# Patient Record
Sex: FEMALE | Race: WHITE | NOT HISPANIC OR LATINO | Employment: OTHER | URBAN - METROPOLITAN AREA
[De-identification: names, ages, dates, MRNs, and addresses within clinical notes are randomized per-mention and may not be internally consistent; named-entity substitution may affect disease eponyms.]

---

## 2017-07-20 ENCOUNTER — HOSPITAL ENCOUNTER (OUTPATIENT)
Dept: MAMMOGRAPHY | Facility: HOSPITAL | Age: 62
Discharge: HOME | End: 2017-07-20

## 2018-08-14 ENCOUNTER — HOSPITAL ENCOUNTER (OUTPATIENT)
Dept: MAMMOGRAPHY | Facility: HOSPITAL | Age: 63
Discharge: HOME | End: 2018-08-14
Attending: FAMILY MEDICINE | Admitting: FAMILY MEDICINE

## 2019-07-08 RX ORDER — LEVOTHYROXINE SODIUM 0.1 MG/1
TABLET ORAL
Qty: 90 TABLET | Refills: 1 | Status: SHIPPED | OUTPATIENT
Start: 2019-07-08 | End: 2019-08-13

## 2019-07-08 RX ORDER — TRIAMTERENE AND HYDROCHLOROTHIAZIDE 37.5; 25 MG/1; MG/1
TABLET ORAL
Qty: 90 TABLET | Refills: 1 | Status: SHIPPED | OUTPATIENT
Start: 2019-07-08 | End: 2019-08-15 | Stop reason: SDUPTHER

## 2019-08-02 ENCOUNTER — RESULTS ENCOUNTER (OUTPATIENT)
Dept: FAMILY MEDICINE CLINIC | Facility: CLINIC | Age: 64
End: 2019-08-02

## 2019-08-02 DIAGNOSIS — E03.9 HYPOTHYROIDISM, UNSPECIFIED TYPE: Primary | ICD-10-CM

## 2019-08-02 DIAGNOSIS — E78.5 DYSLIPIDEMIA: ICD-10-CM

## 2019-08-02 DIAGNOSIS — I10 HYPERTENSION, UNSPECIFIED TYPE: ICD-10-CM

## 2019-08-02 DIAGNOSIS — E03.9 HYPOTHYROIDISM, UNSPECIFIED TYPE: ICD-10-CM

## 2019-08-05 ENCOUNTER — TRANSCRIBE ORDERS (OUTPATIENT)
Dept: ADMINISTRATIVE | Facility: HOSPITAL | Age: 64
End: 2019-08-05

## 2019-08-05 DIAGNOSIS — Z12.39 SCREENING BREAST EXAMINATION: Primary | ICD-10-CM

## 2019-08-06 LAB
ALBUMIN SERPL-MCNC: 4 G/DL (ref 3.6–4.8)
ALBUMIN/GLOB SERPL: 1.7 {RATIO} (ref 1.2–2.2)
ALP SERPL-CCNC: 163 IU/L (ref 39–117)
ALT SERPL-CCNC: 38 IU/L (ref 0–32)
AST SERPL-CCNC: 29 IU/L (ref 0–40)
BASOPHILS # BLD AUTO: 0.1 X10E3/UL (ref 0–0.2)
BASOPHILS NFR BLD AUTO: 1 %
BILIRUB SERPL-MCNC: 0.4 MG/DL (ref 0–1.2)
BUN SERPL-MCNC: 13 MG/DL (ref 8–27)
BUN/CREAT SERPL: 20 (ref 12–28)
CALCIUM SERPL-MCNC: 10.1 MG/DL (ref 8.7–10.3)
CHLORIDE SERPL-SCNC: 106 MMOL/L (ref 96–106)
CHOLEST SERPL-MCNC: 143 MG/DL (ref 100–199)
CO2 SERPL-SCNC: 24 MMOL/L (ref 20–29)
CREAT SERPL-MCNC: 0.65 MG/DL (ref 0.57–1)
EOSINOPHIL # BLD AUTO: 0.1 X10E3/UL (ref 0–0.4)
EOSINOPHIL NFR BLD AUTO: 2 %
ERYTHROCYTE [DISTWIDTH] IN BLOOD BY AUTOMATED COUNT: 12.9 % (ref 12.3–15.4)
GLOBULIN SER CALC-MCNC: 2.3 G/DL (ref 1.5–4.5)
GLUCOSE SERPL-MCNC: 90 MG/DL (ref 65–99)
HCT VFR BLD AUTO: 39.7 % (ref 34–46.6)
HDLC SERPL-MCNC: 55 MG/DL
HGB BLD-MCNC: 13.6 G/DL (ref 11.1–15.9)
IMM GRANULOCYTES # BLD AUTO: 0 X10E3/UL (ref 0–0.1)
IMM GRANULOCYTES NFR BLD AUTO: 0 %
LDLC SERPL CALC-MCNC: 75 MG/DL (ref 0–99)
LYMPHOCYTES # BLD AUTO: 1.2 X10E3/UL (ref 0.7–3.1)
LYMPHOCYTES NFR BLD AUTO: 25 %
MCH RBC QN AUTO: 29.9 PG (ref 26.6–33)
MCHC RBC AUTO-ENTMCNC: 34.3 G/DL (ref 31.5–35.7)
MCV RBC AUTO: 87 FL (ref 79–97)
MONOCYTES # BLD AUTO: 0.3 X10E3/UL (ref 0.1–0.9)
MONOCYTES NFR BLD AUTO: 6 %
NEUTROPHILS # BLD AUTO: 3 X10E3/UL (ref 1.4–7)
NEUTROPHILS NFR BLD AUTO: 66 %
POTASSIUM SERPL-SCNC: 3.6 MMOL/L (ref 3.5–5.2)
PROT SERPL-MCNC: 6.3 G/DL (ref 6–8.5)
RBC # BLD AUTO: 4.55 X10E6/UL (ref 3.77–5.28)
SODIUM SERPL-SCNC: 145 MMOL/L (ref 134–144)
T4 FREE SERPL DIALY-MCNC: 0.9 NG/DL
TRIGL SERPL-MCNC: 63 MG/DL (ref 0–149)
TSH SERPL-ACNC: 2.5 UU/ML
VLDLC SERPL CALC-MCNC: 13 MG/DL (ref 5–40)
WBC # BLD AUTO: 4.6 X10E3/UL (ref 3.4–10.8)

## 2019-08-13 ENCOUNTER — TELEPHONE (OUTPATIENT)
Dept: FAMILY MEDICINE CLINIC | Facility: CLINIC | Age: 64
End: 2019-08-13

## 2019-08-13 RX ORDER — LEVOTHYROXINE SODIUM 112 UG/1
100 TABLET ORAL DAILY
Qty: 90 TABLET | Refills: 0 | Status: SHIPPED | OUTPATIENT
Start: 2019-08-13 | End: 2019-08-15 | Stop reason: SDUPTHER

## 2019-08-15 ENCOUNTER — OFFICE VISIT (OUTPATIENT)
Dept: FAMILY MEDICINE CLINIC | Facility: CLINIC | Age: 64
End: 2019-08-15

## 2019-08-15 VITALS
RESPIRATION RATE: 18 BRPM | OXYGEN SATURATION: 97 % | WEIGHT: 182 LBS | SYSTOLIC BLOOD PRESSURE: 135 MMHG | BODY MASS INDEX: 32.25 KG/M2 | HEIGHT: 63 IN | DIASTOLIC BLOOD PRESSURE: 86 MMHG | TEMPERATURE: 97.9 F | HEART RATE: 76 BPM

## 2019-08-15 DIAGNOSIS — E78.2 MIXED HYPERLIPIDEMIA: ICD-10-CM

## 2019-08-15 DIAGNOSIS — Z12.39 BREAST CANCER SCREENING: ICD-10-CM

## 2019-08-15 DIAGNOSIS — R79.89 ELEVATED LIVER FUNCTION TESTS: ICD-10-CM

## 2019-08-15 DIAGNOSIS — I10 ESSENTIAL HYPERTENSION: Primary | ICD-10-CM

## 2019-08-15 DIAGNOSIS — E03.9 ACQUIRED HYPOTHYROIDISM: ICD-10-CM

## 2019-08-15 PROBLEM — B02.9 HERPES ZOSTER: Status: ACTIVE | Noted: 2018-12-17

## 2019-08-15 PROBLEM — Z12.31 VISIT FOR SCREENING MAMMOGRAM: Status: ACTIVE | Noted: 2017-06-21

## 2019-08-15 PROBLEM — E78.5 HYPERLIPIDEMIA: Status: ACTIVE | Noted: 2019-08-15

## 2019-08-15 PROBLEM — R29.890 LOSS OF HEIGHT: Status: ACTIVE | Noted: 2018-06-25

## 2019-08-15 PROBLEM — J30.9 ALLERGIC RHINITIS: Status: ACTIVE | Noted: 2018-12-26

## 2019-08-15 PROBLEM — Z00.00 ENCOUNTER FOR GENERAL ADULT MEDICAL EXAMINATION WITHOUT ABNORMAL FINDINGS: Status: ACTIVE | Noted: 2017-06-21

## 2019-08-15 PROBLEM — J18.9 PNEUMONIA: Status: ACTIVE | Noted: 2018-12-17

## 2019-08-15 PROCEDURE — 99214 OFFICE O/P EST MOD 30 MIN: CPT | Performed by: FAMILY MEDICINE

## 2019-08-15 RX ORDER — ATORVASTATIN CALCIUM 40 MG/1
1 TABLET, FILM COATED ORAL NIGHTLY
Refills: 1 | COMMUNITY
Start: 2019-05-24 | End: 2019-08-15

## 2019-08-15 RX ORDER — ASPIRIN 325 MG
1 TABLET ORAL DAILY
COMMUNITY
Start: 2018-06-25

## 2019-08-15 RX ORDER — TRIAMTERENE AND HYDROCHLOROTHIAZIDE 37.5; 25 MG/1; MG/1
1 TABLET ORAL DAILY
Qty: 90 TABLET | Refills: 2 | Status: SHIPPED | OUTPATIENT
Start: 2019-08-15 | End: 2019-11-25 | Stop reason: SDUPTHER

## 2019-08-15 RX ORDER — ATORVASTATIN CALCIUM 20 MG/1
20 TABLET, FILM COATED ORAL NIGHTLY
Qty: 90 TABLET | Refills: 1 | Status: SHIPPED | COMMUNITY
Start: 2019-08-15 | End: 2019-08-15 | Stop reason: SDUPTHER

## 2019-08-15 RX ORDER — LEVOTHYROXINE SODIUM 112 UG/1
112 TABLET ORAL DAILY
Qty: 90 TABLET | Refills: 0 | Status: SHIPPED | OUTPATIENT
Start: 2019-08-15 | End: 2019-11-25 | Stop reason: SDUPTHER

## 2019-08-15 RX ORDER — ATORVASTATIN CALCIUM 20 MG/1
20 TABLET, FILM COATED ORAL NIGHTLY
Qty: 90 TABLET | Refills: 1 | Status: SHIPPED | OUTPATIENT
Start: 2019-08-15 | End: 2019-11-25 | Stop reason: SDUPTHER

## 2019-08-15 RX ORDER — TRIAMTERENE AND HYDROCHLOROTHIAZIDE 37.5; 25 MG/1; MG/1
1 TABLET ORAL DAILY
Qty: 90 TABLET | Refills: 2 | Status: CANCELLED | OUTPATIENT
Start: 2019-08-15

## 2019-08-15 RX ORDER — OMEGA-3 FATTY ACIDS/FISH OIL 300-1000MG
4 CAPSULE ORAL DAILY PRN
COMMUNITY
Start: 2016-06-21 | End: 2020-10-05

## 2019-08-15 NOTE — PROGRESS NOTES
Lenny Rodriguez is a 63 y.o. female.     Chief Complaint   Patient presents with   • Hypertension   • Hypothyroidism   • Hyperlipidemia         Current Outpatient Medications:   •  aspirin 325 MG tablet, Take 1 tablet by mouth Daily., Disp: , Rfl:   •  atorvastatin (LIPITOR) 20 MG tablet, Take 1 tablet by mouth Every Night. 200001, Disp: 90 tablet, Rfl: 1  •  Cholecalciferol (VITAMIN D) 1000 units tablet, Take 1 tablet by mouth Daily., Disp: , Rfl:   •  Ibuprofen 200 MG capsule, Take 4 capsules by mouth Daily As Needed., Disp: , Rfl:   •  levothyroxine (SYNTHROID, LEVOTHROID) 112 MCG tablet, Take 1 tablet by mouth Daily., Disp: 90 tablet, Rfl: 0  •  triamterene-hydrochlorothiazide (MAXZIDE-25) 37.5-25 MG per tablet, Take 1 tablet by mouth Daily., Disp: 90 tablet, Rfl: 2    Past Medical History:   Diagnosis Date   • Hyperlipidemia    • Hypertension    • Hypothyroidism    • MAMMO     NEG = 2018   • Obesity    • Otosclerosis    • Postmenopausal        Past Surgical History:   Procedure Laterality Date   • COLONOSCOPY      NEG = 2014, rech 2024   • INNER EAR SURGERY Bilateral    • THYROIDECTOMY, PARTIAL      Left thyroid mass= Benign   • TONSILLECTOMY     • TUBAL ABDOMINAL LIGATION         Family History   Problem Relation Age of Onset   • COPD Mother    • Heart disease Father    • Heart disease Brother    • Lymphoma Brother        Social History     Socioeconomic History   • Marital status:      Spouse name: Not on file   • Number of children: Not on file   • Years of education: Not on file   • Highest education level: Not on file   Tobacco Use   • Smoking status: Never Smoker   • Smokeless tobacco: Never Used   Substance and Sexual Activity   • Alcohol use: No     Frequency: Never   • Drug use: No       64y/o C female here for f/u on HTN/ CHOL/ Hypothyroid/ Osteopenia    Pt doing well on current meds/ doses   Fasting labs done recently and needing review  Mammo and Dexa done last yr and due for mammo  for this yr         The following portions of the patient's history were reviewed and updated as appropriate: allergies, current medications, past family history, past medical history, past social history, past surgical history and problem list.    Review of Systems   Constitutional: Negative for activity change, fatigue and unexpected weight gain.   Respiratory: Negative for cough, chest tightness and shortness of breath.    Cardiovascular: Negative for chest pain, palpitations and leg swelling.   Endocrine: Negative for cold intolerance and heat intolerance.   Genitourinary: Negative for frequency, urgency and urinary incontinence.   Musculoskeletal: Negative for arthralgias and myalgias.   Skin: Negative for dry skin.   Neurological: Negative for dizziness, facial asymmetry, speech difficulty, weakness, light-headedness, headache, memory problem and confusion.       Vitals:    08/15/19 1443   BP: 135/86   Pulse: 76   Resp: 18   Temp: 97.9 °F (36.6 °C)   SpO2: 97%       Objective   Physical Exam   Constitutional: She is oriented to person, place, and time. She appears well-developed and well-nourished.   HENT:   Head: Normocephalic and atraumatic.   Neck: Normal range of motion. Neck supple.   Cardiovascular: Normal rate, regular rhythm, normal heart sounds and intact distal pulses.   No murmur heard.  Pulmonary/Chest: Effort normal and breath sounds normal. No respiratory distress.   Musculoskeletal: She exhibits no edema.   Neurological: She is alert and oriented to person, place, and time. No cranial nerve deficit.   Skin: Skin is warm and dry. No rash noted.   Psychiatric: She has a normal mood and affect. Her behavior is normal. Judgment and thought content normal.   Nursing note and vitals reviewed.        Assessment/Plan   Jonathan was seen today for hypertension, hypothyroidism and hyperlipidemia.    Diagnoses and all orders for this visit:    Essential hypertension    Hypothyroidism due to acquired atrophy  of thyroid  -     Cancel: T4, Free; Future  -     Cancel: TSH; Future  -     T4, Free; Future  -     TSH; Future    Elevated liver function tests  -     Comprehensive Metabolic Panel; Future    Mixed hyperlipidemia  -     Lipid Panel; Future    Breast cancer screening  -     Cancel: Mammo Screening Digital Tomosynthesis Bilateral With CAD; Future    Other orders  -     levothyroxine (SYNTHROID, LEVOTHROID) 112 MCG tablet; Take 1 tablet by mouth Daily.  -     Cancel: triamterene-hydrochlorothiazide (MAXZIDE-25) 37.5-25 MG per tablet; Take 1 tablet by mouth Daily.  -     atorvastatin (LIPITOR) 20 MG tablet; Take 1 tablet by mouth Every Night. 200001  -     triamterene-hydrochlorothiazide (MAXZIDE-25) 37.5-25 MG per tablet; Take 1 tablet by mouth Daily.    due to elevated LFTs, decrease lipitor to 20mg qday and rech fasting labs in 3mos  Increase thyroid dose based on recent labs and rech labs in 3mos

## 2019-08-16 ENCOUNTER — HOSPITAL ENCOUNTER (OUTPATIENT)
Dept: MAMMOGRAPHY | Facility: HOSPITAL | Age: 64
Discharge: HOME | End: 2019-08-16
Admitting: FAMILY MEDICINE

## 2019-08-16 DIAGNOSIS — Z12.39 SCREENING BREAST EXAMINATION: ICD-10-CM

## 2019-08-16 PROCEDURE — 77067 SCR MAMMO BI INCL CAD: CPT

## 2019-08-16 PROCEDURE — 77063 BREAST TOMOSYNTHESIS BI: CPT

## 2019-11-20 LAB
ALBUMIN SERPL-MCNC: 4.1 G/DL (ref 3.6–4.8)
ALBUMIN/GLOB SERPL: 2.4 {RATIO} (ref 1.2–2.2)
ALP SERPL-CCNC: 132 IU/L (ref 39–117)
ALT SERPL-CCNC: 25 IU/L (ref 0–32)
AST SERPL-CCNC: 21 IU/L (ref 0–40)
BILIRUB SERPL-MCNC: 0.3 MG/DL (ref 0–1.2)
BUN SERPL-MCNC: 15 MG/DL (ref 8–27)
BUN/CREAT SERPL: 25 (ref 12–28)
CALCIUM SERPL-MCNC: 10 MG/DL (ref 8.7–10.3)
CHLORIDE SERPL-SCNC: 107 MMOL/L (ref 96–106)
CHOLEST SERPL-MCNC: 141 MG/DL (ref 100–199)
CO2 SERPL-SCNC: 25 MMOL/L (ref 20–29)
CREAT SERPL-MCNC: 0.6 MG/DL (ref 0.57–1)
GLOBULIN SER CALC-MCNC: 1.7 G/DL (ref 1.5–4.5)
GLUCOSE SERPL-MCNC: 87 MG/DL (ref 65–99)
HDLC SERPL-MCNC: 47 MG/DL
LDLC SERPL CALC-MCNC: 80 MG/DL (ref 0–99)
POTASSIUM SERPL-SCNC: 3.8 MMOL/L (ref 3.5–5.2)
PROT SERPL-MCNC: 5.8 G/DL (ref 6–8.5)
SODIUM SERPL-SCNC: 144 MMOL/L (ref 134–144)
T4 FREE SERPL-MCNC: 1.25 NG/DL (ref 0.82–1.77)
TRIGL SERPL-MCNC: 69 MG/DL (ref 0–149)
TSH SERPL DL<=0.005 MIU/L-ACNC: 0.44 UIU/ML (ref 0.45–4.5)
VLDLC SERPL CALC-MCNC: 14 MG/DL (ref 5–40)

## 2019-11-21 ENCOUNTER — RESULTS ENCOUNTER (OUTPATIENT)
Dept: FAMILY MEDICINE CLINIC | Facility: CLINIC | Age: 64
End: 2019-11-21

## 2019-11-21 DIAGNOSIS — E03.9 ACQUIRED HYPOTHYROIDISM: ICD-10-CM

## 2019-11-21 DIAGNOSIS — R79.89 ELEVATED LIVER FUNCTION TESTS: ICD-10-CM

## 2019-11-21 DIAGNOSIS — E78.2 MIXED HYPERLIPIDEMIA: ICD-10-CM

## 2019-11-22 ENCOUNTER — TELEPHONE (OUTPATIENT)
Dept: FAMILY MEDICINE CLINIC | Facility: CLINIC | Age: 64
End: 2019-11-22

## 2019-11-22 NOTE — TELEPHONE ENCOUNTER
Called pt re: labs ordered by previous provider. Informed her that previous provider no longer at this location and that has to establish care with one of the physicians or NP's here. She states that approx 1 mo ago, that she switched over to Dr. Manning. Advised pt that it was fine and all, but that she did not have an appt for labs to be interpreted. She voiced understanding was transferred to FO to schedule appt.

## 2019-11-22 NOTE — TELEPHONE ENCOUNTER
----- Message from Kerri Almanza MD sent at 11/21/2019  3:47 PM EST -----  Dr. Watters  forwarded these labs to me.  Patient appears to not have an appointment scheduled with me or anyone.  I presume Dr. Watters does not intend to deal with these labs.  Please contact Ms. Rodriguez and find out if she intends to see one of us here in [place] and ask her to schedule an appointment so we can assume her care.  Inform her that her previous doctor ordered lab work that has come to us, and in order for us to manage any of this, we will need to see her.  Thanks

## 2019-11-22 NOTE — TELEPHONE ENCOUNTER
----- Message from Kerri Almanza MD sent at 11/21/2019  3:47 PM EST -----  Dr. Watters  forwarded these labs to me.  Patient appears to not have an appointment scheduled with me or anyone.  I presume Dr. Watters does not intend to deal with these labs.  Please contact Ms. Rodriguez and find out if she intends to see one of us here in [place] and ask her to schedule an appointment so we can assume her care.  Inform her that her previous doctor ordered lab work that has come to us, and in order for us to manage any of this, we will need to see her.  Thanks

## 2019-11-25 ENCOUNTER — OFFICE VISIT (OUTPATIENT)
Dept: FAMILY MEDICINE CLINIC | Facility: CLINIC | Age: 64
End: 2019-11-25

## 2019-11-25 VITALS
RESPIRATION RATE: 16 BRPM | WEIGHT: 178 LBS | HEART RATE: 76 BPM | HEIGHT: 63 IN | SYSTOLIC BLOOD PRESSURE: 131 MMHG | DIASTOLIC BLOOD PRESSURE: 85 MMHG | TEMPERATURE: 98.1 F | OXYGEN SATURATION: 96 % | BODY MASS INDEX: 31.54 KG/M2

## 2019-11-25 DIAGNOSIS — E78.2 MIXED HYPERLIPIDEMIA: ICD-10-CM

## 2019-11-25 DIAGNOSIS — Z53.09 MRI CONTRAINDICATED DUE TO METAL IMPLANT: Chronic | ICD-10-CM

## 2019-11-25 DIAGNOSIS — E03.9 ACQUIRED HYPOTHYROIDISM: Primary | ICD-10-CM

## 2019-11-25 DIAGNOSIS — I10 ESSENTIAL HYPERTENSION: ICD-10-CM

## 2019-11-25 PROBLEM — H80.93 OTOSCLEROSIS OF BOTH EARS: Status: ACTIVE | Noted: 2019-11-25

## 2019-11-25 PROCEDURE — 99214 OFFICE O/P EST MOD 30 MIN: CPT | Performed by: FAMILY MEDICINE

## 2019-11-25 RX ORDER — LEVOTHYROXINE SODIUM 112 UG/1
112 TABLET ORAL DAILY
Qty: 90 TABLET | Refills: 3 | Status: SHIPPED | OUTPATIENT
Start: 2019-11-25 | End: 2021-02-05

## 2019-11-25 RX ORDER — ATORVASTATIN CALCIUM 20 MG/1
20 TABLET, FILM COATED ORAL NIGHTLY
Qty: 90 TABLET | Refills: 3 | Status: SHIPPED | OUTPATIENT
Start: 2019-11-25 | End: 2021-02-05

## 2019-11-25 RX ORDER — TRIAMTERENE AND HYDROCHLOROTHIAZIDE 37.5; 25 MG/1; MG/1
1 TABLET ORAL DAILY
Qty: 90 TABLET | Refills: 3 | Status: SHIPPED | OUTPATIENT
Start: 2019-11-25 | End: 2021-01-01

## 2019-11-25 NOTE — PROGRESS NOTES
"Lenny Rodriguez is a 64 y.o. female.   Chief Complaint   Patient presents with   • Hypertension   • Hypothyroidism   • Hyperlipidemia   • Follow-up     Follow up on lab results that were ordered by Dr. Watters.        History of Present Illness   Presents today as a new patient to me.  Was scheduled to \"go over labs\" I have never seen her before.  Last saw Dr. Watters in August.  Dr. Watters had ordered lab work on her and just forwarded it to me.  Labs from November reviewed with the patient as below.    Hypothyroidism-she had a partial thyroidectomy.  No cancer was identified.  She is not having any symptoms to suggest hyperthyroidism.  No palpitations, no diaphoresis, no loss of hair.  No excess weight gain.  Hypertension-tolerating medicines without side effect.  Blood pressure today is at goal.  Hyperlipidemia-tolerating statin without myalgias.  LDL is at goal.    She has no other complaints today and feels good.  She does want to mention that she cannot have an MRI due to metal implants in her inner ear.    I have added an item in her problem list to draw attention to this.    Patient Active Problem List    Diagnosis Date Noted   • Acquired hypothyroidism 11/25/2019   • Otosclerosis of both ears 11/25/2019     Note Last Updated: 11/25/2019     Replaced bones in ears with metal pistons - NO MRI's     • MRI contraindicated due to metal implant 11/25/2019     Note Last Updated: 11/25/2019     Due to metal implants in inner ear     • Mixed hyperlipidemia 08/15/2019   • Allergic rhinitis 12/26/2018   • Herpes zoster 12/17/2018   • Pneumonia 12/17/2018   • Loss of height 06/25/2018   • Encounter for general adult medical examination without abnormal findings 06/21/2017   • Visit for screening mammogram 06/21/2017   • Obesity with body mass index 30 or greater 06/21/2016   • Hemorrhoids, external 06/11/2012   • Obesity 06/11/2012   • Thyromegaly 06/11/2012   • Vaginitis, atrophic 06/11/2012   • Decreased " white blood cell count 06/04/2012   • Essential hypertension 06/04/2012           Past Surgical History:   Procedure Laterality Date   • COLONOSCOPY      NEG = 2014, rech 2024   • INNER EAR SURGERY Bilateral    • THYROIDECTOMY, PARTIAL      Left thyroid mass= Benign   • TONSILLECTOMY     • TUBAL ABDOMINAL LIGATION       Current Outpatient Medications on File Prior to Visit   Medication Sig   • aspirin 325 MG tablet Take 1 tablet by mouth Daily.   • Ibuprofen 200 MG capsule Take 4 capsules by mouth Daily As Needed.   • [DISCONTINUED] atorvastatin (LIPITOR) 20 MG tablet Take 1 tablet by mouth Every Night. 200001   • [DISCONTINUED] levothyroxine (SYNTHROID, LEVOTHROID) 112 MCG tablet Take 1 tablet by mouth Daily.   • [DISCONTINUED] triamterene-hydrochlorothiazide (MAXZIDE-25) 37.5-25 MG per tablet Take 1 tablet by mouth Daily.   • [DISCONTINUED] Cholecalciferol (VITAMIN D) 1000 units tablet Take 1 tablet by mouth Daily.     No current facility-administered medications on file prior to visit.      No Known Allergies  Social History     Socioeconomic History   • Marital status:      Spouse name: Not on file   • Number of children: Not on file   • Years of education: Not on file   • Highest education level: Not on file   Tobacco Use   • Smoking status: Never Smoker   • Smokeless tobacco: Never Used   Substance and Sexual Activity   • Alcohol use: No     Frequency: Never   • Drug use: No     Family History   Problem Relation Age of Onset   • COPD Mother    • Heart disease Father    • Heart disease Brother    • Lymphoma Brother      The following portions of the patient's history were reviewed and updated as appropriate: allergies, current medications, past family history, past medical history, past social history, past surgical history and problem list.    Review of Systems   Constitutional: Negative for chills and fever.   Respiratory: Negative for cough and shortness of breath.    Cardiovascular: Negative for  "chest pain.   Gastrointestinal: Negative for abdominal pain, diarrhea, nausea and vomiting.   Genitourinary: Negative for dysuria.   Neurological: Negative for light-headedness and headache.       Objective   /85 (BP Location: Left arm, Patient Position: Sitting, Cuff Size: Adult)   Pulse 76   Temp 98.1 °F (36.7 °C) (Oral)   Resp 16   Ht 160 cm (63\")   Wt 80.7 kg (178 lb)   SpO2 96%   BMI 31.53 kg/m²   Physical Exam   Constitutional: She is oriented to person, place, and time. She appears well-developed and well-nourished.   HENT:   Head: Normocephalic and atraumatic.   Mouth/Throat: Oropharynx is clear and moist.   Eyes: Conjunctivae and EOM are normal. Pupils are equal, round, and reactive to light.   Neck: Neck supple. No JVD present. No thyromegaly present.   Cardiovascular: Normal rate, regular rhythm, normal heart sounds and intact distal pulses.   No murmur heard.  Pulmonary/Chest: Effort normal and breath sounds normal. No respiratory distress. She has no wheezes. She has no rales. She exhibits no tenderness.   Abdominal: Soft. She exhibits no distension and no mass. There is no tenderness. There is no rebound and no guarding.   Musculoskeletal: Normal range of motion. She exhibits no edema.   Lymphadenopathy:     She has no cervical adenopathy.   Neurological: She is alert and oriented to person, place, and time.   Skin: Skin is warm. No rash noted.   Psychiatric: She has a normal mood and affect. Her behavior is normal.         Results Encounter on 11/21/2019   Component Date Value Ref Range Status   • Free T4 11/19/2019 1.25  0.82 - 1.77 ng/dL Final   • TSH 11/19/2019 0.444* 0.450 - 4.500 uIU/mL Final   • Total Cholesterol 11/19/2019 141  100 - 199 mg/dL Final   • Triglycerides 11/19/2019 69  0 - 149 mg/dL Final   • HDL Cholesterol 11/19/2019 47  >39 mg/dL Final   • VLDL Cholesterol 11/19/2019 14  5 - 40 mg/dL Final   • LDL Cholesterol  11/19/2019 80  0 - 99 mg/dL Final   • Glucose 11/19/2019 " 87  65 - 99 mg/dL Final   • BUN 11/19/2019 15  8 - 27 mg/dL Final   • Creatinine 11/19/2019 0.60  0.57 - 1.00 mg/dL Final   • eGFR Non African Am 11/19/2019 95  >59 mL/min/1.73 Final   • eGFR African Am 11/19/2019 110  >59 mL/min/1.73 Final   • BUN/Creatinine Ratio 11/19/2019 25  12 - 28 Final   • Sodium 11/19/2019 144  134 - 144 mmol/L Final   • Potassium 11/19/2019 3.8  3.5 - 5.2 mmol/L Final   • Chloride 11/19/2019 107* 96 - 106 mmol/L Final   • Total CO2 11/19/2019 25  20 - 29 mmol/L Final   • Calcium 11/19/2019 10.0  8.7 - 10.3 mg/dL Final   • Total Protein 11/19/2019 5.8* 6.0 - 8.5 g/dL Final   • Albumin 11/19/2019 4.1  3.6 - 4.8 g/dL Final   • Globulin 11/19/2019 1.7  1.5 - 4.5 g/dL Final   • A/G Ratio 11/19/2019 2.4* 1.2 - 2.2 Final   • Total Bilirubin 11/19/2019 0.3  0.0 - 1.2 mg/dL Final   • Alkaline Phosphatase 11/19/2019 132* 39 - 117 IU/L Final   • AST (SGOT) 11/19/2019 21  0 - 40 IU/L Final   • ALT (SGPT) 11/19/2019 25  0 - 32 IU/L Final   Orders Only on 08/02/2019   Component Date Value Ref Range Status   • WBC 08/02/2019 4.6  3.4 - 10.8 x10E3/uL Final   • RBC 08/02/2019 4.55  3.77 - 5.28 x10E6/uL Final   • Hemoglobin 08/02/2019 13.6  11.1 - 15.9 g/dL Final   • Hematocrit 08/02/2019 39.7  34.0 - 46.6 % Final   • MCV 08/02/2019 87  79 - 97 fL Final   • MCH 08/02/2019 29.9  26.6 - 33.0 pg Final   • MCHC 08/02/2019 34.3  31.5 - 35.7 g/dL Final   • RDW 08/02/2019 12.9  12.3 - 15.4 % Final   • Neutrophil Rel % 08/02/2019 66  Not Estab. % Final   • Lymphocyte Rel % 08/02/2019 25  Not Estab. % Final   • Monocyte Rel % 08/02/2019 6  Not Estab. % Final   • Eosinophil Rel % 08/02/2019 2  Not Estab. % Final   • Basophil Rel % 08/02/2019 1  Not Estab. % Final   • Neutrophils Absolute 08/02/2019 3.0  1.4 - 7.0 x10E3/uL Final   • Lymphocytes Absolute 08/02/2019 1.2  0.7 - 3.1 x10E3/uL Final   • Monocytes Absolute 08/02/2019 0.3  0.1 - 0.9 x10E3/uL Final   • Eosinophils Absolute 08/02/2019 0.1  0.0 - 0.4 x10E3/uL  Final   • Basophils Absolute 08/02/2019 0.1  0.0 - 0.2 x10E3/uL Final   • Immature Granulocyte Rel % 08/02/2019 0  Not Estab. % Final   • Immature Grans Absolute 08/02/2019 0.0  0.0 - 0.1 x10E3/uL Final   • Glucose 08/02/2019 90  65 - 99 mg/dL Final   • BUN 08/02/2019 13  8 - 27 mg/dL Final   • Creatinine 08/02/2019 0.65  0.57 - 1.00 mg/dL Final   • eGFR Non  Am 08/02/2019 93  >59 mL/min/1.73 Final   • eGFR African Am 08/02/2019 108  >59 mL/min/1.73 Final   • BUN/Creatinine Ratio 08/02/2019 20  12 - 28 Final   • Sodium 08/02/2019 145* 134 - 144 mmol/L Final   • Potassium 08/02/2019 3.6  3.5 - 5.2 mmol/L Final   • Chloride 08/02/2019 106  96 - 106 mmol/L Final   • Total CO2 08/02/2019 24  20 - 29 mmol/L Final   • Calcium 08/02/2019 10.1  8.7 - 10.3 mg/dL Final   • Total Protein 08/02/2019 6.3  6.0 - 8.5 g/dL Final   • Albumin 08/02/2019 4.0  3.6 - 4.8 g/dL Final   • Globulin 08/02/2019 2.3  1.5 - 4.5 g/dL Final   • A/G Ratio 08/02/2019 1.7  1.2 - 2.2 Final   • Total Bilirubin 08/02/2019 0.4  0.0 - 1.2 mg/dL Final   • Alkaline Phosphatase 08/02/2019 163* 39 - 117 IU/L Final   • AST (SGOT) 08/02/2019 29  0 - 40 IU/L Final   • ALT (SGPT) 08/02/2019 38* 0 - 32 IU/L Final   • Total Cholesterol 08/02/2019 143  100 - 199 mg/dL Final   • Triglycerides 08/02/2019 63  0 - 149 mg/dL Final   • HDL Cholesterol 08/02/2019 55  >39 mg/dL Final   • VLDL Cholesterol 08/02/2019 13  5 - 40 mg/dL Final   • LDL Cholesterol  08/02/2019 75  0 - 99 mg/dL Final   • TSH-ICMA 08/02/2019 2.5  uU/mL Final    Comment: Reference Range:  Pubertal Children and Adults:  0.5 - 4.8  Pregnancy  First Trimester 0.100-4.000  Second Trimester 0.200-4.000  Third Trimester 0.300-4.500  Non-Pregnant Adult 0.450-4.500     • Free T4 08/02/2019 0.90  ng/dL Final    Comment: Reference Range:  Pubertal Children and Adults:  0.8 - 1.7           Assessment/Plan   Diagnoses and all orders for this visit:    1. Acquired hypothyroidism (Primary)    2. Essential  hypertension    3. Mixed hyperlipidemia    4. MRI contraindicated due to metal implant    Other orders  -     atorvastatin (LIPITOR) 20 MG tablet; Take 1 tablet by mouth Every Night. 200001  Dispense: 90 tablet; Refill: 3  -     triamterene-hydrochlorothiazide (MAXZIDE-25) 37.5-25 MG per tablet; Take 1 tablet by mouth Daily.  Dispense: 90 tablet; Refill: 3  -     levothyroxine (SYNTHROID, LEVOTHROID) 112 MCG tablet; Take 1 tablet by mouth Daily.  Dispense: 90 tablet; Refill: 3    Doing well today.  No changes in her medicines.  Refilled everything today.  Return in 6 months for reevaluation.  Will be due to repeat her thyroid levels at that time.  We will see her back again in August or September, which is her anniversary month for receiving Medicare.  We will plan to recheck her lipid panel annually.  We will arrange Medicare subsequent visit for late fall next year at her next visit.       Return in about 6 months (around 5/25/2020).    Call with any problems or concerns before next visit

## 2020-02-25 ENCOUNTER — OFFICE VISIT (OUTPATIENT)
Dept: FAMILY MEDICINE CLINIC | Facility: CLINIC | Age: 65
End: 2020-02-25

## 2020-02-25 VITALS
TEMPERATURE: 98.4 F | BODY MASS INDEX: 31.89 KG/M2 | SYSTOLIC BLOOD PRESSURE: 151 MMHG | DIASTOLIC BLOOD PRESSURE: 94 MMHG | RESPIRATION RATE: 16 BRPM | OXYGEN SATURATION: 97 % | WEIGHT: 180 LBS | HEIGHT: 63 IN | HEART RATE: 80 BPM

## 2020-02-25 DIAGNOSIS — M17.11 PRIMARY OSTEOARTHRITIS OF RIGHT KNEE: Primary | ICD-10-CM

## 2020-02-25 PROCEDURE — 99213 OFFICE O/P EST LOW 20 MIN: CPT | Performed by: FAMILY MEDICINE

## 2020-02-25 RX ORDER — MELOXICAM 15 MG/1
15 TABLET ORAL DAILY
Qty: 30 TABLET | Refills: 5 | Status: SHIPPED | OUTPATIENT
Start: 2020-02-25 | End: 2020-06-01

## 2020-02-25 NOTE — PROGRESS NOTES
Subjective   Jonathan Rodriguez is a 64 y.o. female.   Chief Complaint   Patient presents with   • Knee Pain     (R) knee pain since Fri; Denies injury/trauma. Aches; ROM impeded; swollen        History of Present Illness   Presents to the office today with right knee pain which is gotten worse since Friday.  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  While walking down the [place], she thought she felt something almost snap inside her knee.  It became very painful to walk and has slowly gotten worse.  Since then the knee has swollen.  For a few days it was difficult to flex her knee because of the swelling.  That has gotten a little bit better.  She has had no falls.  No known injuries.  She did have a torn cartilage in her left knee years ago.  Since that time, she is carried more of her weight on the right knee.  She cannot squat down.  If she does that she cannot get back up.  Knees have been that way for a long time.      Patient Active Problem List    Diagnosis Date Noted   • Acquired hypothyroidism 11/25/2019   • Otosclerosis of both ears 11/25/2019     Note Last Updated: 11/25/2019     Replaced bones in ears with metal pistons - NO MRI's     • MRI contraindicated due to metal implant 11/25/2019     Note Last Updated: 11/25/2019     Due to metal implants in inner ear     • Mixed hyperlipidemia 08/15/2019   • Allergic rhinitis 12/26/2018   • Herpes zoster 12/17/2018   • Pneumonia 12/17/2018   • Loss of height 06/25/2018   • Encounter for general adult medical examination without abnormal findings 06/21/2017   • Visit for screening mammogram 06/21/2017   • Obesity with body mass index 30 or greater 06/21/2016   • Hemorrhoids, external 06/11/2012   • Obesity 06/11/2012   • Thyromegaly 06/11/2012   • Vaginitis, atrophic 06/11/2012   • Decreased white blood cell count 06/04/2012   • Essential hypertension 06/04/2012           Past Surgical History:    Procedure Laterality Date   • COLONOSCOPY      NEG = 2014, rech 2024   • INNER EAR SURGERY Bilateral    • THYROIDECTOMY, PARTIAL      Left thyroid mass= Benign   • TONSILLECTOMY     • TUBAL ABDOMINAL LIGATION       Current Outpatient Medications on File Prior to Visit   Medication Sig   • aspirin 325 MG tablet Take 1 tablet by mouth Daily.   • atorvastatin (LIPITOR) 20 MG tablet Take 1 tablet by mouth Every Night. 200001   • Ibuprofen 200 MG capsule Take 4 capsules by mouth Daily As Needed.   • levothyroxine (SYNTHROID, LEVOTHROID) 112 MCG tablet Take 1 tablet by mouth Daily.   • triamterene-hydrochlorothiazide (MAXZIDE-25) 37.5-25 MG per tablet Take 1 tablet by mouth Daily.     No current facility-administered medications on file prior to visit.      No Known Allergies  Social History     Socioeconomic History   • Marital status:      Spouse name: Not on file   • Number of children: Not on file   • Years of education: Not on file   • Highest education level: Not on file   Tobacco Use   • Smoking status: Never Smoker   • Smokeless tobacco: Never Used   Substance and Sexual Activity   • Alcohol use: No     Frequency: Never   • Drug use: No     Family History   Problem Relation Age of Onset   • COPD Mother    • Heart disease Father    • Heart disease Brother    • Lymphoma Brother      The following portions of the patient's history were reviewed and updated as appropriate: allergies, current medications, past family history, past medical history, past social history, past surgical history and problem list.    Review of Systems   Constitutional: Negative for chills and fever.   Respiratory: Negative for shortness of breath.    Cardiovascular: Negative for chest pain.   Gastrointestinal: Negative for abdominal pain.   Neurological: Negative for light-headedness and headache.       Objective   /94 (BP Location: Left arm, Patient Position: Sitting, Cuff Size: Adult)   Pulse 80   Temp 98.4 °F (36.9 °C)  "(Oral)   Resp 16   Ht 160 cm (63\")   Wt 81.6 kg (180 lb)   SpO2 97%   BMI 31.89 kg/m²   Physical Exam   Constitutional: She is oriented to person, place, and time. She appears well-developed and well-nourished.   HENT:   Head: Normocephalic and atraumatic.   Eyes: Conjunctivae and EOM are normal.   Neck: Normal range of motion.   Cardiovascular: Normal rate.   Pulmonary/Chest: Effort normal.   Musculoskeletal: Normal range of motion.   Right knee slightly enlarged.  Minimal effusion.  Possible Baker's cyst posteriorly.  Medial joint line tenderness.  Knee is stable to anterior and posterior drawer test.  No laxity to medial and lateral collateral ligaments.  Positive crepitus to flexion and extension.  Lachman's maneuver is equivocal.  There is no bruising around the knee.   Neurological: She is alert and oriented to person, place, and time.   Skin: Skin is warm and dry. No rash noted.   Psychiatric: She has a normal mood and affect. Her behavior is normal.     Assessment/Plan   Diagnoses and all orders for this visit:    1. Primary osteoarthritis of right knee (Primary)  -     Cancel: XR Knee 1 or 2 View Bilateral (In Office)  -     XR Knee 1 or 2 View Right (In Office)    Other orders  -     meloxicam (MOBIC) 15 MG tablet; Take 1 tablet by mouth Daily.  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  We will put her in a patellar soft tracking brace.  We will do an x-ray of her knee to evaluate bony positioning.  We will start a daily anti-inflammatory.  Have recommended ice and rest as much as possible.  If symptoms worsen such as sudden increase in pain, locking of her knee, or any falls, let me know right away and we will proceed with a CT and probable orthopedic referral.         Return if symptoms worsen or fail to improve.    Call with any problems or concerns before next visit  "

## 2020-02-26 ENCOUNTER — TELEPHONE (OUTPATIENT)
Dept: FAMILY MEDICINE CLINIC | Facility: CLINIC | Age: 65
End: 2020-02-26

## 2020-02-26 NOTE — TELEPHONE ENCOUNTER
----- Message from Kerri Almanza MD sent at 2/25/2020 10:10 PM EST -----  Please let Tiffany know that the radiologist read her knee x-ray as basically normal.  This would suggest that while the cartilage surface might be roughened, it has not worn enough to allow the bone edges to rub together.  This is a good sign.  Hopefully this means the cushion between her knee is temporarily suffering from overuse, and extra support, anti-inflammatory, and as much rest as possible will get things back to normal.  If knee symptoms worsen, let me know and we will proceed as we discussed in the office.  Thanks

## 2020-02-26 NOTE — TELEPHONE ENCOUNTER
Called patient. Patient's identify verified. Advised her of xr results. She voiced understanding and will contact office if it worsens.

## 2020-06-01 ENCOUNTER — OFFICE VISIT (OUTPATIENT)
Dept: FAMILY MEDICINE CLINIC | Facility: CLINIC | Age: 65
End: 2020-06-01

## 2020-06-01 VITALS
HEIGHT: 63 IN | DIASTOLIC BLOOD PRESSURE: 84 MMHG | WEIGHT: 179 LBS | SYSTOLIC BLOOD PRESSURE: 146 MMHG | HEART RATE: 83 BPM | OXYGEN SATURATION: 97 % | BODY MASS INDEX: 31.71 KG/M2 | TEMPERATURE: 98 F

## 2020-06-01 DIAGNOSIS — I51.7 LVH (LEFT VENTRICULAR HYPERTROPHY): ICD-10-CM

## 2020-06-01 DIAGNOSIS — M17.11 PRIMARY OSTEOARTHRITIS OF RIGHT KNEE: ICD-10-CM

## 2020-06-01 DIAGNOSIS — R06.02 SOB (SHORTNESS OF BREATH): Primary | ICD-10-CM

## 2020-06-01 DIAGNOSIS — E03.9 ACQUIRED HYPOTHYROIDISM: ICD-10-CM

## 2020-06-01 DIAGNOSIS — E78.2 MIXED HYPERLIPIDEMIA: ICD-10-CM

## 2020-06-01 DIAGNOSIS — I10 ESSENTIAL HYPERTENSION: ICD-10-CM

## 2020-06-01 PROCEDURE — 93000 ELECTROCARDIOGRAM COMPLETE: CPT | Performed by: FAMILY MEDICINE

## 2020-06-01 PROCEDURE — 99214 OFFICE O/P EST MOD 30 MIN: CPT | Performed by: FAMILY MEDICINE

## 2020-06-01 RX ORDER — CELECOXIB 200 MG/1
200 CAPSULE ORAL DAILY
Qty: 30 CAPSULE | Refills: 5 | Status: SHIPPED | OUTPATIENT
Start: 2020-06-01 | End: 2021-11-15

## 2020-06-01 RX ORDER — AMLODIPINE BESYLATE 5 MG/1
5 TABLET ORAL DAILY
Qty: 30 TABLET | Refills: 5 | Status: SHIPPED | OUTPATIENT
Start: 2020-06-01 | End: 2020-10-05

## 2020-06-01 NOTE — PROGRESS NOTES
Lenny Rodriguez is a 64 y.o. female.   Chief Complaint   Patient presents with   • Knee Pain   • Shortness of Breath       History of Present Illness     Presents to the office today with a complaint of knee pain.  I last saw her for this back in February.  At that time we did some x-rays of her right knee and this was essentially normal.  She does have a Baker's cyst behind her right knee.  I told her if the problem did not get better we would do a CT of her knee for referral on to Ortho.  Her knees are no different.  Still very achy and stiff.  Once her right knee locked up and she could not walk.  Her knees are becoming very weak.  She is having increasing difficulty rising from a seated and squatting position.  She has tried ibuprofen up to 600 mg every 4 hours without improvement.    In addition to both knees hurting.  She complains of right hip pain.    New complaint-shortness of air for a while.  This is getting worse - namely on exertion.  She has a family history of heart disease.  She has had various episodes in the past where she felt there was a sudden pressure on her chest that took her breath away.  Typically has resolved within seconds.    Last labs were in November 2019.  Lipid panel was excellent.  CMP was grossly normal except for minimal elevation in alk phos.  TSH was 0.444.  Notably, these labs were ordered by another provider before I assumed her care.    Patient Active Problem List    Diagnosis Date Noted   • LVH (left ventricular hypertrophy) 06/01/2020   • Acquired hypothyroidism 11/25/2019   • Otosclerosis of both ears 11/25/2019     Note Last Updated: 11/25/2019     Replaced bones in ears with metal pistons - NO MRI's     • MRI contraindicated due to metal implant 11/25/2019     Note Last Updated: 11/25/2019     Due to metal implants in inner ear     • Mixed hyperlipidemia 08/15/2019   • Allergic rhinitis 12/26/2018   • Herpes zoster 12/17/2018   • Pneumonia 12/17/2018   • Loss  of height 06/25/2018   • Encounter for general adult medical examination without abnormal findings 06/21/2017   • Visit for screening mammogram 06/21/2017   • Obesity with body mass index 30 or greater 06/21/2016   • Hemorrhoids, external 06/11/2012   • Obesity 06/11/2012   • Thyromegaly 06/11/2012   • Vaginitis, atrophic 06/11/2012   • Decreased white blood cell count 06/04/2012   • Essential hypertension 06/04/2012           Past Surgical History:   Procedure Laterality Date   • COLONOSCOPY      NEG = 2014, rech 2024   • INNER EAR SURGERY Bilateral    • THYROIDECTOMY, PARTIAL      Left thyroid mass= Benign   • TONSILLECTOMY     • TUBAL ABDOMINAL LIGATION       Current Outpatient Medications on File Prior to Visit   Medication Sig   • aspirin 325 MG tablet Take 1 tablet by mouth Daily.   • atorvastatin (LIPITOR) 20 MG tablet Take 1 tablet by mouth Every Night. 200001   • Ibuprofen 200 MG capsule Take 4 capsules by mouth Daily As Needed.   • levothyroxine (SYNTHROID, LEVOTHROID) 112 MCG tablet Take 1 tablet by mouth Daily.   • triamterene-hydrochlorothiazide (MAXZIDE-25) 37.5-25 MG per tablet Take 1 tablet by mouth Daily.   • [DISCONTINUED] meloxicam (MOBIC) 15 MG tablet Take 1 tablet by mouth Daily.     No current facility-administered medications on file prior to visit.      No Known Allergies  Social History     Socioeconomic History   • Marital status:      Spouse name: Not on file   • Number of children: Not on file   • Years of education: Not on file   • Highest education level: Not on file   Tobacco Use   • Smoking status: Never Smoker   • Smokeless tobacco: Never Used   Substance and Sexual Activity   • Alcohol use: No     Frequency: Never   • Drug use: No     Family History   Problem Relation Age of Onset   • COPD Mother    • Heart disease Father    • Heart disease Brother    • Lymphoma Brother      The following portions of the patient's history were reviewed and updated as appropriate: allergies,  "current medications, past family history, past medical history, past social history, past surgical history and problem list.    Review of Systems   Constitutional: Negative for chills and fever.   Respiratory: Positive for shortness of breath. Negative for cough, chest tightness and wheezing.    Cardiovascular: Negative for palpitations and leg swelling.   Gastrointestinal: Negative for abdominal distention.   Neurological: Negative for dizziness, syncope and headache.       Objective   /84 (BP Location: Right arm, Patient Position: Sitting, Cuff Size: Adult)   Pulse 83   Temp 98 °F (36.7 °C) (Oral)   Ht 160 cm (62.99\")   Wt 81.2 kg (179 lb)   SpO2 97%   BMI 31.72 kg/m²   Physical Exam   Constitutional: She is oriented to person, place, and time. She appears well-developed and well-nourished.   HENT:   Head: Normocephalic and atraumatic.   Eyes: Conjunctivae and EOM are normal.   Neck: Normal range of motion.   Cardiovascular: Normal rate and regular rhythm. PMI is not displaced.   Murmur (loudest at RUSB) heard.   Systolic murmur is present with a grade of 3/6.  Pulmonary/Chest: Effort normal and breath sounds normal. No respiratory distress. She has no decreased breath sounds.   Musculoskeletal: Normal range of motion. She exhibits no edema.   Both knees show slight enlargement consistent with osteoarthritis.   Neurological: She is alert and oriented to person, place, and time.   Skin: Skin is warm and dry. No rash noted.   Psychiatric: She has a normal mood and affect. Her behavior is normal.       ECG 12 Lead  Date/Time: 6/1/2020 2:25 PM  Performed by: Kerri Almanza MD  Authorized by: Kerri Almanza MD   Comparison: not compared with previous ECG   Previous ECG: no previous ECG available  Rhythm: sinus rhythm  Rate: normal  QRS axis: normal  Other findings: non-specific ST-T wave changes and left ventricular hypertrophy    Clinical impression: abnormal EKG            EKG done in the " office shows LVH.      Assessment/Plan   Diagnoses and all orders for this visit:    1. SOB (shortness of breath) (Primary)  -     ECG 12 Lead  -     Stress Test With Myocardial Perfusion - One Day; Future    2. Acquired hypothyroidism    3. Essential hypertension  -     amLODIPine (NORVASC) 5 MG tablet; Take 1 tablet by mouth Daily.  Dispense: 30 tablet; Refill: 5    4. Mixed hyperlipidemia    5. Primary osteoarthritis of right knee  -     celecoxib (CeleBREX) 200 MG capsule; Take 1 capsule by mouth Daily.  Dispense: 30 capsule; Refill: 5    6. LVH (left ventricular hypertrophy)  -     Adult Transthoracic Echo Complete W/ Cont if Necessary Per Protocol  -     Stress Test With Myocardial Perfusion - One Day; Future    For her knee pain, will change her meloxicam to Celebrex 200 mg/day.  If this does not help enough to allow her normal function and mobility, then will make a referral to orthopedics.    We will add amlodipine to intensify blood pressure treatment.  Because of the LVH identified on her EKG, her dyspnea with exertion and a new heart murmur, will work her up with a 2D echocardiogram and a Lexiscan stress test.  Preferably results of the echo will be available first.  We will follow-up with her here in the office a few days after the tests are available.  Continue taking the aspirin once daily that she has been taking.             Return in about 2 weeks (around 6/15/2020) for appt AFTER echo and stress test.    Call with any problems or concerns before next visit

## 2020-06-05 ENCOUNTER — TELEPHONE (OUTPATIENT)
Dept: FAMILY MEDICINE CLINIC | Facility: CLINIC | Age: 65
End: 2020-06-05

## 2020-06-05 NOTE — TELEPHONE ENCOUNTER
Patient called she is supposed to have a echo cardiogram on the 15th, she has metal in her ears and is wanting to know if that would be okay to still go ahead and get the test done?

## 2020-06-15 ENCOUNTER — HOSPITAL ENCOUNTER (OUTPATIENT)
Dept: NUCLEAR MEDICINE | Facility: HOSPITAL | Age: 65
Discharge: HOME | End: 2020-06-15

## 2020-06-15 ENCOUNTER — HOSPITAL ENCOUNTER (OUTPATIENT)
Dept: CARDIOLOGY | Facility: HOSPITAL | Age: 65
Discharge: HOME | End: 2020-06-15

## 2020-06-15 VITALS
DIASTOLIC BLOOD PRESSURE: 83 MMHG | HEART RATE: 94 BPM | BODY MASS INDEX: 31.72 KG/M2 | WEIGHT: 179.01 LBS | HEIGHT: 63 IN | SYSTOLIC BLOOD PRESSURE: 159 MMHG

## 2020-06-15 DIAGNOSIS — R06.02 SOB (SHORTNESS OF BREATH): ICD-10-CM

## 2020-06-15 DIAGNOSIS — I51.7 LVH (LEFT VENTRICULAR HYPERTROPHY): ICD-10-CM

## 2020-06-15 LAB
BH CV NUCLEAR PRIOR STUDY: 3
BH CV STRESS BP STAGE 1: NORMAL
BH CV STRESS BP STAGE 2: NORMAL
BH CV STRESS BP STAGE 3: NORMAL
BH CV STRESS COMMENTS STAGE 1: NORMAL
BH CV STRESS COMMENTS STAGE 2: NORMAL
BH CV STRESS DOSE REGADENOSON STAGE 1: 0.4
BH CV STRESS DURATION MIN STAGE 1: 0
BH CV STRESS DURATION MIN STAGE 2: 4
BH CV STRESS DURATION SEC STAGE 1: 10
BH CV STRESS DURATION SEC STAGE 2: 0
BH CV STRESS HR STAGE 1: 102
BH CV STRESS HR STAGE 2: 108
BH CV STRESS HR STAGE 3: 106
BH CV STRESS PROTOCOL 1: NORMAL
BH CV STRESS RECOVERY BP: NORMAL MMHG
BH CV STRESS RECOVERY HR: 99 BPM
BH CV STRESS STAGE 1: 1
BH CV STRESS STAGE 2: 2
BH CV STRESS STAGE 3: 3
LV EF NUC BP: 84 %
MAXIMAL PREDICTED HEART RATE: 154 BPM
PERCENT MAX PREDICTED HR: 70.13 %
STRESS BASELINE BP: NORMAL MMHG
STRESS BASELINE HR: 75 BPM
STRESS PERCENT HR: 83 %
STRESS POST PEAK BP: NORMAL MMHG
STRESS POST PEAK HR: 108 BPM
STRESS TARGET HR: 131 BPM

## 2020-06-15 PROCEDURE — 0 TECHNETIUM SESTAMIBI: Performed by: FAMILY MEDICINE

## 2020-06-15 PROCEDURE — 93018 CV STRESS TEST I&R ONLY: CPT | Performed by: NURSE PRACTITIONER

## 2020-06-15 PROCEDURE — 78452 HT MUSCLE IMAGE SPECT MULT: CPT

## 2020-06-15 PROCEDURE — A9500 TC99M SESTAMIBI: HCPCS | Performed by: FAMILY MEDICINE

## 2020-06-15 PROCEDURE — 93017 CV STRESS TEST TRACING ONLY: CPT

## 2020-06-15 PROCEDURE — 78452 HT MUSCLE IMAGE SPECT MULT: CPT | Performed by: INTERNAL MEDICINE

## 2020-06-15 PROCEDURE — 25010000002 REGADENOSON 0.4 MG/5ML SOLUTION: Performed by: FAMILY MEDICINE

## 2020-06-15 RX ADMIN — REGADENOSON 0.4 MG: 0.08 INJECTION, SOLUTION INTRAVENOUS at 13:15

## 2020-06-15 RX ADMIN — TECHNETIUM TC 99M SESTAMIBI 1 DOSE: 1 INJECTION INTRAVENOUS at 13:15

## 2020-06-15 RX ADMIN — TECHNETIUM TC 99M SESTAMIBI 1 DOSE: 1 INJECTION INTRAVENOUS at 12:05

## 2020-06-16 LAB
ASCENDING AORTA: 3.2 CM
BH CV ECHO MEAS - % IVS THICK: 12.6 %
BH CV ECHO MEAS - % LVPW THICK: 31.8 %
BH CV ECHO MEAS - ACS: 2.2 CM
BH CV ECHO MEAS - AO MAX PG (FULL): 5.1 MMHG
BH CV ECHO MEAS - AO MAX PG: 10.5 MMHG
BH CV ECHO MEAS - AO MEAN PG (FULL): 2.2 MMHG
BH CV ECHO MEAS - AO MEAN PG: 4.7 MMHG
BH CV ECHO MEAS - AO ROOT AREA (BSA CORRECTED): 1.6
BH CV ECHO MEAS - AO ROOT AREA: 6.9 CM^2
BH CV ECHO MEAS - AO ROOT DIAM: 3 CM
BH CV ECHO MEAS - AO V2 MAX: 162 CM/SEC
BH CV ECHO MEAS - AO V2 MEAN: 100.6 CM/SEC
BH CV ECHO MEAS - AO V2 VTI: 30.4 CM
BH CV ECHO MEAS - AORTIC HR: 69.4 BPM
BH CV ECHO MEAS - AORTIC R-R: 0.86 SEC
BH CV ECHO MEAS - ASC AORTA: 3.2 CM
BH CV ECHO MEAS - AVA(I,A): 2.9 CM^2
BH CV ECHO MEAS - AVA(I,D): 2.9 CM^2
BH CV ECHO MEAS - AVA(V,A): 2.8 CM^2
BH CV ECHO MEAS - AVA(V,D): 2.8 CM^2
BH CV ECHO MEAS - BSA(HAYCOCK): 1.9 M^2
BH CV ECHO MEAS - BSA: 1.8 M^2
BH CV ECHO MEAS - BZI_BMI: 31.7 KILOGRAMS/M^2
BH CV ECHO MEAS - BZI_METRIC_HEIGHT: 160 CM
BH CV ECHO MEAS - BZI_METRIC_WEIGHT: 81.2 KG
BH CV ECHO MEAS - CI(AO): 8 L/MIN/M^2
BH CV ECHO MEAS - CI(LVOT): 3.3 L/MIN/M^2
BH CV ECHO MEAS - CO(AO): 14.7 L/MIN
BH CV ECHO MEAS - CO(LVOT): 6 L/MIN
BH CV ECHO MEAS - EDV(CUBED): 75.2 ML
BH CV ECHO MEAS - EDV(MOD-SP2): 66.3 ML
BH CV ECHO MEAS - EDV(MOD-SP4): 74.2 ML
BH CV ECHO MEAS - EDV(TEICH): 79.5 ML
BH CV ECHO MEAS - EF(CUBED): 71.1 %
BH CV ECHO MEAS - EF(MOD-BP): 59 %
BH CV ECHO MEAS - EF(MOD-SP2): 65 %
BH CV ECHO MEAS - EF(MOD-SP4): 52.3 %
BH CV ECHO MEAS - EF(TEICH): 63.1 %
BH CV ECHO MEAS - ESV(CUBED): 21.8 ML
BH CV ECHO MEAS - ESV(MOD-SP2): 23.2 ML
BH CV ECHO MEAS - ESV(MOD-SP4): 35.4 ML
BH CV ECHO MEAS - ESV(TEICH): 29.3 ML
BH CV ECHO MEAS - FS: 33.9 %
BH CV ECHO MEAS - IVS/LVPW: 0.99
BH CV ECHO MEAS - IVSD: 1.1 CM
BH CV ECHO MEAS - IVSS: 1.3 CM
BH CV ECHO MEAS - LA DIMENSION(2D): 3.6 CM
BH CV ECHO MEAS - LA DIMENSION: 3.7 CM
BH CV ECHO MEAS - LA/AO: 1.3
BH CV ECHO MEAS - LAT PEAK E' VEL: 7 CM/SEC
BH CV ECHO MEAS - LV DIASTOLIC VOL/BSA (35-75): 40.2 ML/M^2
BH CV ECHO MEAS - LV IVRT: 0.08 SEC
BH CV ECHO MEAS - LV MASS(C)D: 163 GRAMS
BH CV ECHO MEAS - LV MASS(C)DI: 88.4 GRAMS/M^2
BH CV ECHO MEAS - LV MASS(C)S: 123.8 GRAMS
BH CV ECHO MEAS - LV MASS(C)SI: 67.1 GRAMS/M^2
BH CV ECHO MEAS - LV MAX PG: 5.4 MMHG
BH CV ECHO MEAS - LV MEAN PG: 2.5 MMHG
BH CV ECHO MEAS - LV SYSTOLIC VOL/BSA (12-30): 19.2 ML/M^2
BH CV ECHO MEAS - LV V1 MAX: 115.8 CM/SEC
BH CV ECHO MEAS - LV V1 MEAN: 73.2 CM/SEC
BH CV ECHO MEAS - LV V1 VTI: 21.9 CM
BH CV ECHO MEAS - LVIDD: 4.2 CM
BH CV ECHO MEAS - LVIDS: 2.8 CM
BH CV ECHO MEAS - LVOT AREA: 4 CM^2
BH CV ECHO MEAS - LVOT DIAM: 2.2 CM
BH CV ECHO MEAS - LVPWD: 1.1 CM
BH CV ECHO MEAS - LVPWS: 1.5 CM
BH CV ECHO MEAS - MED PEAK E' VEL: 8 CM/SEC
BH CV ECHO MEAS - MV A MAX VEL: 111.8 CM/SEC
BH CV ECHO MEAS - MV DEC SLOPE: 297.4 CM/SEC^2
BH CV ECHO MEAS - MV DEC TIME: 0.26 SEC
BH CV ECHO MEAS - MV E MAX VEL: 78.2 CM/SEC
BH CV ECHO MEAS - MV E/A: 0.7
BH CV ECHO MEAS - MV MAX PG: 4.5 MMHG
BH CV ECHO MEAS - MV MEAN PG: 1.6 MMHG
BH CV ECHO MEAS - MV P1/2T: 57 MSEC
BH CV ECHO MEAS - MV V2 MAX: 106.5 CM/SEC
BH CV ECHO MEAS - MV V2 MEAN: 58.6 CM/SEC
BH CV ECHO MEAS - MV V2 VTI: 25.9 CM
BH CV ECHO MEAS - MVA(P1/2T): 3.8 CM2
BH CV ECHO MEAS - MVA(VTI): 3.4 CM^2
BH CV ECHO MEAS - PA ACC TIME: 0.11 SEC
BH CV ECHO MEAS - PA MAX PG (FULL): 0.92 MMHG
BH CV ECHO MEAS - PA MAX PG: 3.9 MMHG
BH CV ECHO MEAS - PA MEAN PG (FULL): 0.74 MMHG
BH CV ECHO MEAS - PA MEAN PG: 2.5 MMHG
BH CV ECHO MEAS - PA PR(ACCEL): 30.2 MMHG
BH CV ECHO MEAS - PA V2 MAX: 98.8 CM/SEC
BH CV ECHO MEAS - PA V2 MEAN: 77.2 CM/SEC
BH CV ECHO MEAS - PA V2 VTI: 18.4 CM
BH CV ECHO MEAS - PAPD(PI EDV): 7 MMHG
BH CV ECHO MEAS - PI END-D VEL: 100.7 CM/SEC
BH CV ECHO MEAS - PULM A REVS DUR: 0.09 SEC
BH CV ECHO MEAS - PULM A REVS VEL: 33.3 CM/SEC
BH CV ECHO MEAS - PULM DIAS VEL: 51.1 CM/SEC
BH CV ECHO MEAS - PULM S/D: 1.5
BH CV ECHO MEAS - PULM SYS VEL: 74.1 CM/SEC
BH CV ECHO MEAS - PVA(I,A): 3.2 CM^2
BH CV ECHO MEAS - PVA(I,D): 3.2 CM^2
BH CV ECHO MEAS - PVA(V,A): 4 CM^2
BH CV ECHO MEAS - PVA(V,D): 4 CM^2
BH CV ECHO MEAS - QP/QS: 0.67
BH CV ECHO MEAS - RAP SYSTOLE: 3 MMHG
BH CV ECHO MEAS - RV MAX PG: 3 MMHG
BH CV ECHO MEAS - RV MEAN PG: 1.8 MMHG
BH CV ECHO MEAS - RV V1 MAX: 86.4 CM/SEC
BH CV ECHO MEAS - RV V1 MEAN: 64.8 CM/SEC
BH CV ECHO MEAS - RV V1 VTI: 12.6 CM
BH CV ECHO MEAS - RVDD: 2.7 CM
BH CV ECHO MEAS - RVOT AREA: 4.6 CM^2
BH CV ECHO MEAS - RVOT DIAM: 2.4 CM
BH CV ECHO MEAS - RVSP: 23.3 MMHG
BH CV ECHO MEAS - SI(AO): 114.5 ML/M^2
BH CV ECHO MEAS - SI(CUBED): 29 ML/M^2
BH CV ECHO MEAS - SI(LVOT): 47.1 ML/M^2
BH CV ECHO MEAS - SI(MOD-SP2): 23.4 ML/M^2
BH CV ECHO MEAS - SI(MOD-SP4): 21 ML/M^2
BH CV ECHO MEAS - SI(TEICH): 27.2 ML/M^2
BH CV ECHO MEAS - SV(AO): 211.3 ML
BH CV ECHO MEAS - SV(CUBED): 53.4 ML
BH CV ECHO MEAS - SV(LVOT): 86.9 ML
BH CV ECHO MEAS - SV(MOD-SP2): 43.1 ML
BH CV ECHO MEAS - SV(MOD-SP4): 38.8 ML
BH CV ECHO MEAS - SV(RVOT): 58.1 ML
BH CV ECHO MEAS - SV(TEICH): 50.2 ML
BH CV ECHO MEAS - TAPSE (>1.6): 3 CM2
BH CV ECHO MEAS - TR MAX VEL: 225.4 CM/SEC
BH CV ECHO MEASUREMENTS AVERAGE E/E' RATIO: 10.43
BH CV XLRA - TDI S': 21 CM/SEC
IVRT: 84 MSEC
LEFT ATRIUM VOLUME INDEX: 26 ML/M2
LEFT ATRIUM VOLUME: 48 CM3
PV VALVE AREA: 4 CM2

## 2020-06-16 NOTE — PROGRESS NOTES
Lenny Rodriguez is a 64 y.o. female.   Chief Complaint   Patient presents with   • Shortness of Breath       History of Present Illness     Presents today to follow-up after work-up for shortness of breath.  At the last visit we did a nuclear stress test and an echocardiogram.  The stress test was normal and echocardiogram showed a normal ejection fraction of 60 to 65% and mild tricuspid valve regurgitation.     Her blood pressure was also high at the last visit and we started her on amlodipine 5 mg/day.  She is tolerating the amlodipine without side effects.  She also has osteoarthritis of her knees and I started her on Celebrex.    She continues to describe shortness of breath with exertion.  At rest she feels fine.  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  The year before she retired was very stressful.  She has no known lung problems.  Last blood work was about 6 months ago.  She does not have any swelling in her feet.  No PND, no orthopnea.      Patient Active Problem List    Diagnosis Date Noted   • LVH (left ventricular hypertrophy) 06/01/2020   • Acquired hypothyroidism 11/25/2019   • Otosclerosis of both ears 11/25/2019     Note Last Updated: 11/25/2019     Replaced bones in ears with metal pistons - NO MRI's     • MRI contraindicated due to metal implant 11/25/2019     Note Last Updated: 11/25/2019     Due to metal implants in inner ear     • Mixed hyperlipidemia 08/15/2019   • Allergic rhinitis 12/26/2018   • Herpes zoster 12/17/2018   • Pneumonia 12/17/2018   • Loss of height 06/25/2018   • Encounter for general adult medical examination without abnormal findings 06/21/2017   • Visit for screening mammogram 06/21/2017   • Obesity with body mass index 30 or greater 06/21/2016   • Hemorrhoids, external 06/11/2012   • Obesity 06/11/2012   • Thyromegaly 06/11/2012   • Vaginitis, atrophic 06/11/2012   • Decreased white blood cell count 06/04/2012   • Essential hypertension  06/04/2012           Past Surgical History:   Procedure Laterality Date   • COLONOSCOPY      NEG = 2014, rech 2024   • INNER EAR SURGERY Bilateral    • THYROIDECTOMY, PARTIAL      Left thyroid mass= Benign   • TONSILLECTOMY     • TUBAL ABDOMINAL LIGATION       Current Outpatient Medications on File Prior to Visit   Medication Sig   • amLODIPine (NORVASC) 5 MG tablet Take 1 tablet by mouth Daily.   • aspirin 325 MG tablet Take 1 tablet by mouth Daily.   • atorvastatin (LIPITOR) 20 MG tablet Take 1 tablet by mouth Every Night. 200001   • celecoxib (CeleBREX) 200 MG capsule Take 1 capsule by mouth Daily.   • Ibuprofen 200 MG capsule Take 4 capsules by mouth Daily As Needed.   • levothyroxine (SYNTHROID, LEVOTHROID) 112 MCG tablet Take 1 tablet by mouth Daily.   • triamterene-hydrochlorothiazide (MAXZIDE-25) 37.5-25 MG per tablet Take 1 tablet by mouth Daily.     No current facility-administered medications on file prior to visit.      No Known Allergies  Social History     Socioeconomic History   • Marital status:      Spouse name: Not on file   • Number of children: Not on file   • Years of education: Not on file   • Highest education level: Not on file   Tobacco Use   • Smoking status: Never Smoker   • Smokeless tobacco: Never Used   Substance and Sexual Activity   • Alcohol use: No     Frequency: Never   • Drug use: No     Family History   Problem Relation Age of Onset   • COPD Mother    • Heart disease Father    • Heart disease Brother    • Lymphoma Brother      The following portions of the patient's history were reviewed and updated as appropriate: allergies, current medications, past family history, past medical history, past social history, past surgical history and problem list.    Review of Systems   Constitutional: Negative for chills and fever.   Respiratory: Negative for cough and shortness of breath.    Cardiovascular: Negative for leg swelling.   Gastrointestinal: Negative for abdominal pain.  "      Objective   /88 (BP Location: Right arm, Patient Position: Sitting, Cuff Size: Adult)   Pulse 84   Temp 98.2 °F (36.8 °C) (Infrared)   Ht 160 cm (62.99\")   Wt 80.7 kg (178 lb)   SpO2 99%   BMI 31.54 kg/m²   Physical Exam   Constitutional: She is oriented to person, place, and time. She appears well-developed and well-nourished.   HENT:   Head: Normocephalic and atraumatic.   Eyes: Conjunctivae and EOM are normal.   Neck: Normal range of motion.   Cardiovascular: Normal rate.   Murmur heard.   Systolic murmur is present with a grade of 2/6.  Heart murmur not as loud today.   Pulmonary/Chest: Effort normal and breath sounds normal. She has no wheezes. She has no rhonchi. She has no rales.   Musculoskeletal: Normal range of motion.   Neurological: She is alert and oriented to person, place, and time.   Skin: Skin is warm and dry. No rash noted.   Psychiatric: She has a normal mood and affect. Her behavior is normal.       Assessment/Plan   Diagnoses and all orders for this visit:    1. SOB (shortness of breath) (Primary)  -     XR Chest PA & Lateral (In Office)    2. Essential hypertension  -     CBC & Differential  -     Comprehensive Metabolic Panel    3. Primary osteoarthritis of right knee    4. Acquired hypothyroidism  -     TSH    5. Screening for cholesterol level  -     Lipid Panel    I reassured her about the lack of findings on her cardiac tests.  We will press on with work-up of shortness of breath with a chest x-ray here in the office today.  We will do blood work as above.  It is possible she could be experiencing some occult anemia or progressive thyroid failure.  Will screen for high cholesterol and diabetes at the same time.  If all these tests are negative will consider pulmonary function tests.  She could have some restrictive lung disease.  We will follow-up here in 2 weeks to recheck her blood pressure and continue titrating medications to target blood pressure range.     "     Return in about 2 weeks (around 7/1/2020).    Call with any problems or concerns before next visit

## 2020-06-17 ENCOUNTER — OFFICE VISIT (OUTPATIENT)
Dept: FAMILY MEDICINE CLINIC | Facility: CLINIC | Age: 65
End: 2020-06-17

## 2020-06-17 VITALS
OXYGEN SATURATION: 99 % | HEIGHT: 63 IN | SYSTOLIC BLOOD PRESSURE: 146 MMHG | HEART RATE: 84 BPM | WEIGHT: 178 LBS | DIASTOLIC BLOOD PRESSURE: 88 MMHG | TEMPERATURE: 98.2 F | BODY MASS INDEX: 31.54 KG/M2

## 2020-06-17 DIAGNOSIS — Z13.220 SCREENING FOR CHOLESTEROL LEVEL: ICD-10-CM

## 2020-06-17 DIAGNOSIS — E03.9 ACQUIRED HYPOTHYROIDISM: ICD-10-CM

## 2020-06-17 DIAGNOSIS — M17.11 PRIMARY OSTEOARTHRITIS OF RIGHT KNEE: ICD-10-CM

## 2020-06-17 DIAGNOSIS — R06.02 SOB (SHORTNESS OF BREATH): Primary | ICD-10-CM

## 2020-06-17 DIAGNOSIS — I10 ESSENTIAL HYPERTENSION: ICD-10-CM

## 2020-06-17 PROCEDURE — 99214 OFFICE O/P EST MOD 30 MIN: CPT | Performed by: FAMILY MEDICINE

## 2020-06-18 LAB
ALBUMIN SERPL-MCNC: 4.3 G/DL (ref 3.8–4.8)
ALBUMIN/GLOB SERPL: 1.9 {RATIO} (ref 1.2–2.2)
ALP SERPL-CCNC: 139 IU/L (ref 39–117)
ALT SERPL-CCNC: 29 IU/L (ref 0–32)
AST SERPL-CCNC: 23 IU/L (ref 0–40)
BASOPHILS # BLD AUTO: 0.1 X10E3/UL (ref 0–0.2)
BASOPHILS NFR BLD AUTO: 1 %
BILIRUB SERPL-MCNC: 0.2 MG/DL (ref 0–1.2)
BUN SERPL-MCNC: 16 MG/DL (ref 8–27)
BUN/CREAT SERPL: 20 (ref 12–28)
CALCIUM SERPL-MCNC: 10.7 MG/DL (ref 8.7–10.3)
CHLORIDE SERPL-SCNC: 101 MMOL/L (ref 96–106)
CHOLEST SERPL-MCNC: 155 MG/DL (ref 100–199)
CO2 SERPL-SCNC: 26 MMOL/L (ref 20–29)
CREAT SERPL-MCNC: 0.81 MG/DL (ref 0.57–1)
EOSINOPHIL # BLD AUTO: 0.1 X10E3/UL (ref 0–0.4)
EOSINOPHIL NFR BLD AUTO: 2 %
ERYTHROCYTE [DISTWIDTH] IN BLOOD BY AUTOMATED COUNT: 12.1 % (ref 11.7–15.4)
GLOBULIN SER CALC-MCNC: 2.3 G/DL (ref 1.5–4.5)
GLUCOSE SERPL-MCNC: 130 MG/DL (ref 65–99)
HCT VFR BLD AUTO: 39.5 % (ref 34–46.6)
HDLC SERPL-MCNC: 55 MG/DL
HGB BLD-MCNC: 13.3 G/DL (ref 11.1–15.9)
IMM GRANULOCYTES # BLD AUTO: 0 X10E3/UL (ref 0–0.1)
IMM GRANULOCYTES NFR BLD AUTO: 0 %
LDLC SERPL CALC-MCNC: 78 MG/DL (ref 0–99)
LYMPHOCYTES # BLD AUTO: 1.1 X10E3/UL (ref 0.7–3.1)
LYMPHOCYTES NFR BLD AUTO: 17 %
MCH RBC QN AUTO: 29.6 PG (ref 26.6–33)
MCHC RBC AUTO-ENTMCNC: 33.7 G/DL (ref 31.5–35.7)
MCV RBC AUTO: 88 FL (ref 79–97)
MONOCYTES # BLD AUTO: 0.5 X10E3/UL (ref 0.1–0.9)
MONOCYTES NFR BLD AUTO: 7 %
NEUTROPHILS # BLD AUTO: 4.8 X10E3/UL (ref 1.4–7)
NEUTROPHILS NFR BLD AUTO: 73 %
PLATELET # BLD AUTO: 265 X10E3/UL (ref 150–450)
POTASSIUM SERPL-SCNC: 3.5 MMOL/L (ref 3.5–5.2)
PROT SERPL-MCNC: 6.6 G/DL (ref 6–8.5)
RBC # BLD AUTO: 4.49 X10E6/UL (ref 3.77–5.28)
SODIUM SERPL-SCNC: 141 MMOL/L (ref 134–144)
TRIGL SERPL-MCNC: 111 MG/DL (ref 0–149)
TSH SERPL DL<=0.005 MIU/L-ACNC: 0.56 UIU/ML (ref 0.45–4.5)
VLDLC SERPL CALC-MCNC: 22 MG/DL (ref 5–40)
WBC # BLD AUTO: 6.6 X10E3/UL (ref 3.4–10.8)

## 2020-06-19 DIAGNOSIS — R06.02 SOB (SHORTNESS OF BREATH): Primary | ICD-10-CM

## 2020-07-23 ENCOUNTER — TELEPHONE (OUTPATIENT)
Dept: FAMILY MEDICINE CLINIC | Facility: CLINIC | Age: 65
End: 2020-07-23

## 2020-07-23 DIAGNOSIS — Z12.31 ENCOUNTER FOR SCREENING MAMMOGRAM FOR BREAST CANCER: ICD-10-CM

## 2020-07-23 DIAGNOSIS — Z12.39 BREAST CANCER SCREENING: Primary | ICD-10-CM

## 2020-07-23 DIAGNOSIS — M17.11 PRIMARY OSTEOARTHRITIS OF RIGHT KNEE: ICD-10-CM

## 2020-07-23 NOTE — TELEPHONE ENCOUNTER
Orders placed for the referral to Ortho and for a mammogram.  Please schedule a mammogram at the location of her choice.  Thanks

## 2020-07-23 NOTE — TELEPHONE ENCOUNTER
Patient got a letter from [facility] saying it was time for her yearly Mammo she is wanting to get this done after 08/15/2020, there is an old order in there from  other than that there needs to be a new one. Patient says that her Right knee isn't any better wanting to get an appt set up with Ortho.

## 2020-08-07 ENCOUNTER — TRANSCRIBE ORDERS (OUTPATIENT)
Dept: SURGERY | Facility: CLINIC | Age: 65
End: 2020-08-07

## 2020-08-07 DIAGNOSIS — Z01.818 OTHER SPECIFIED PRE-OPERATIVE EXAMINATION: Primary | ICD-10-CM

## 2020-08-20 ENCOUNTER — APPOINTMENT (OUTPATIENT)
Dept: MAMMOGRAPHY | Facility: HOSPITAL | Age: 65
End: 2020-08-20

## 2020-08-20 ENCOUNTER — APPOINTMENT (OUTPATIENT)
Dept: RESPIRATORY THERAPY | Facility: HOSPITAL | Age: 65
End: 2020-08-20

## 2020-08-26 ENCOUNTER — HOSPITAL ENCOUNTER (OUTPATIENT)
Dept: MAMMOGRAPHY | Facility: HOSPITAL | Age: 65
Discharge: HOME | End: 2020-08-26
Admitting: FAMILY MEDICINE

## 2020-08-26 ENCOUNTER — TRANSCRIBE ORDERS (OUTPATIENT)
Dept: ADMINISTRATIVE | Facility: HOSPITAL | Age: 65
End: 2020-08-26

## 2020-08-26 ENCOUNTER — APPOINTMENT (OUTPATIENT)
Dept: LAB | Facility: HOSPITAL | Age: 65
End: 2020-08-26

## 2020-08-26 DIAGNOSIS — Z12.31 ENCOUNTER FOR SCREENING MAMMOGRAM FOR BREAST CANCER: ICD-10-CM

## 2020-08-26 DIAGNOSIS — M19.90 ARTHRITIS: Primary | ICD-10-CM

## 2020-08-26 PROCEDURE — 77067 SCR MAMMO BI INCL CAD: CPT

## 2020-08-26 PROCEDURE — 77063 BREAST TOMOSYNTHESIS BI: CPT

## 2020-08-27 ENCOUNTER — LAB (OUTPATIENT)
Dept: LAB | Facility: HOSPITAL | Age: 65
End: 2020-08-27

## 2020-08-27 DIAGNOSIS — Z01.818 OTHER SPECIFIED PRE-OPERATIVE EXAMINATION: ICD-10-CM

## 2020-08-27 PROCEDURE — C9803 HOPD COVID-19 SPEC COLLECT: HCPCS

## 2020-08-27 PROCEDURE — U0002 COVID-19 LAB TEST NON-CDC: HCPCS

## 2020-08-27 PROCEDURE — U0004 COV-19 TEST NON-CDC HGH THRU: HCPCS

## 2020-08-28 LAB
REF LAB TEST METHOD: NORMAL
SARS-COV-2 RNA RESP QL NAA+PROBE: NOT DETECTED

## 2020-08-29 ENCOUNTER — HOSPITAL ENCOUNTER (OUTPATIENT)
Dept: RESPIRATORY THERAPY | Facility: HOSPITAL | Age: 65
Discharge: HOME | End: 2020-08-29
Admitting: FAMILY MEDICINE

## 2020-08-29 DIAGNOSIS — R06.02 SOB (SHORTNESS OF BREATH): ICD-10-CM

## 2020-08-29 PROCEDURE — 94060 EVALUATION OF WHEEZING: CPT

## 2020-08-29 PROCEDURE — 94729 DIFFUSING CAPACITY: CPT

## 2020-08-29 PROCEDURE — A9270 NON-COVERED ITEM OR SERVICE: HCPCS | Performed by: FAMILY MEDICINE

## 2020-08-29 PROCEDURE — 94727 GAS DIL/WSHOT DETER LNG VOL: CPT

## 2020-08-29 PROCEDURE — 63710000001 ALBUTEROL SULFATE HFA 108 (90 BASE) MCG/ACT AEROSOL SOLUTION: Performed by: FAMILY MEDICINE

## 2020-08-29 RX ORDER — ALBUTEROL SULFATE 90 UG/1
2 AEROSOL, METERED RESPIRATORY (INHALATION) ONCE AS NEEDED
Status: COMPLETED | OUTPATIENT
Start: 2020-08-29 | End: 2020-08-29

## 2020-08-29 RX ADMIN — ALBUTEROL SULFATE 2 PUFF: 108 AEROSOL, METERED RESPIRATORY (INHALATION) at 10:51

## 2020-09-02 ENCOUNTER — HOSPITAL ENCOUNTER (OUTPATIENT)
Dept: CT IMAGING | Facility: HOSPITAL | Age: 65
End: 2020-09-02

## 2020-09-04 ENCOUNTER — TELEPHONE (OUTPATIENT)
Dept: FAMILY MEDICINE CLINIC | Facility: CLINIC | Age: 65
End: 2020-09-04

## 2020-09-04 RX ORDER — ALBUTEROL SULFATE 90 UG/1
2 AEROSOL, METERED RESPIRATORY (INHALATION) EVERY 6 HOURS PRN
Qty: 18 G | Refills: 3 | Status: SHIPPED | OUTPATIENT
Start: 2020-09-04 | End: 2021-04-05 | Stop reason: SDUPTHER

## 2020-09-10 ENCOUNTER — HOSPITAL ENCOUNTER (OUTPATIENT)
Dept: INTERVENTIONAL RADIOLOGY/VASCULAR | Facility: HOSPITAL | Age: 65
Discharge: HOME | End: 2020-09-10

## 2020-09-10 ENCOUNTER — HOSPITAL ENCOUNTER (OUTPATIENT)
Dept: CT IMAGING | Facility: HOSPITAL | Age: 65
Discharge: HOME | End: 2020-09-10

## 2020-09-10 DIAGNOSIS — M19.90 ARTHRITIS: ICD-10-CM

## 2020-09-10 PROCEDURE — 73580 CONTRAST X-RAY OF KNEE JOINT: CPT

## 2020-09-10 PROCEDURE — 0 IOPAMIDOL PER 1 ML: Performed by: ORTHOPAEDIC SURGERY

## 2020-09-10 PROCEDURE — 77002 NEEDLE LOCALIZATION BY XRAY: CPT

## 2020-09-10 PROCEDURE — 73701 CT LOWER EXTREMITY W/DYE: CPT

## 2020-09-10 RX ADMIN — IOPAMIDOL 15 ML: 755 INJECTION, SOLUTION INTRAVENOUS at 08:30

## 2020-09-16 ENCOUNTER — TELEPHONE (OUTPATIENT)
Dept: FAMILY MEDICINE CLINIC | Facility: CLINIC | Age: 65
End: 2020-09-16

## 2020-09-16 DIAGNOSIS — R73.9 HYPERGLYCEMIA: ICD-10-CM

## 2020-09-16 DIAGNOSIS — I10 ESSENTIAL HYPERTENSION: ICD-10-CM

## 2020-09-16 DIAGNOSIS — E01.0 THYROMEGALY: ICD-10-CM

## 2020-09-16 DIAGNOSIS — E78.2 MIXED HYPERLIPIDEMIA: Primary | ICD-10-CM

## 2020-09-16 DIAGNOSIS — E03.9 ACQUIRED HYPOTHYROIDISM: ICD-10-CM

## 2020-09-16 NOTE — TELEPHONE ENCOUNTER
Patient calling to see if you want some labs for her upcoming appt with you. Says she normally has them done in August. Yearly bloodwork w/thyroid she says.

## 2020-09-16 NOTE — TELEPHONE ENCOUNTER
Please tell her that orders for blood work are placed.  She may get them at her convenience.  For what it is worth, she had most of this blood work done back in June.  Mainly what I needed to follow-up on now was her blood sugar.  Thanks

## 2020-10-01 LAB
ALBUMIN SERPL-MCNC: 4.1 G/DL
ALBUMIN/GLOB SERPL: 2 {RATIO}
ALP SERPL-CCNC: 138 IU/L
ALT SERPL-CCNC: 24 IU/L
AST SERPL-CCNC: 23 IU/L
BILIRUB SERPL-MCNC: <0.2 MG/DL
BUN SERPL-MCNC: 20 MG/DL
BUN/CREAT SERPL: 25
CALCIUM SERPL-MCNC: 10.7 MG/DL
CHLORIDE SERPL-SCNC: 104 MMOL/L (ref 96–106)
CHOLEST SERPL-MCNC: 158 MG/DL
CO2 SERPL-SCNC: 24 MMOL/L
CREAT SERPL-MCNC: 0.8 MG/DL
GLOBULIN SER CALC-MCNC: 2.1 G/DL (ref 1.5–4.5)
GLUCOSE SERPL-MCNC: 88 MG/DL (ref 65–99)
HBA1C MFR BLD: 5.5 % (ref 4.8–5.6)
HDLC SERPL-MCNC: 53 MG/DL
LDLC SERPL CALC-MCNC: 89 MG/DL
Lab: NORMAL
POTASSIUM SERPL-SCNC: 3.6 MMOL/L
PROT SERPL-MCNC: 6.2 G/DL
SODIUM SERPL-SCNC: 142 MMOL/L (ref 134–144)
TRIGL SERPL-MCNC: 85 MG/DL
TSH SERPL DL<=0.005 MIU/L-ACNC: 0.93 UIU/ML
VLDLC SERPL CALC-MCNC: 16 MG/DL (ref 5–40)
WRITTEN AUTHORIZATION: NORMAL

## 2020-10-04 PROBLEM — J18.9 PNEUMONIA: Status: RESOLVED | Noted: 2018-12-17 | Resolved: 2020-10-04

## 2020-10-04 PROBLEM — M17.11 ARTHRITIS OF RIGHT KNEE: Status: ACTIVE | Noted: 2020-10-04

## 2020-10-04 NOTE — PROGRESS NOTES
"Lenny Rodriguez is a 65 y.o. female.   Chief Complaint   Patient presents with   • Hypertension   • sob       History of Present Illness   Presents to the office today for follow-up on chronic medical problems.    Echo in June - we worked up a complaint of shortness of breath.  She had a normal nuclear stress test and echocardiogram that really only showed mild tricuspid regurgitation.  She went on to have pulmonary function tests she did have a nonspecific ventilatory defect that did improve with the bronchodilator.  I suspect this means she has some mild asthma for reactive airway disease.  I started her on an albuterol inhaler to have as needed.  Since I last saw her, she has continued to use albuterol maybe up to twice a day.  She notes she will get short of breath and have some wheezing and after 1 or 2 puffs on the albuterol those symptoms resolved.    Since her last visit she has had a CT arthrogram of her right knee.  This was ordered by Dr. Malloy.  She had medial arthritis, probable meniscal tears and chondromalacia with a large Baker's cyst filled with contrast.  The Celebrex I started her on for arthritic pain has really helped her knee.  She actually canceled an upcoming planned injection.  Her left knee feels very good with Celebrex and the right knee is tolerable.  She asks if she can take turmeric for inflammation.    She also called and wanted her \"annual labs\" done before the visit.    TSH was normal at 0.929, A1c was normal at 5.5%.  Lipid panel was excellent with a total cholesterol of 158, LDL of 89, HDL of 53 and triglycerides of 85.  CMP showed normal renal function, normal electrolytes, normal LFTs.  I reviewed these labs with her as above.      HTN - B/P at home usually runs around 140-150.    Patient Active Problem List    Diagnosis Date Noted   • Reactive airway disease without complication 10/05/2020   • Arthritis of right knee 10/04/2020   • LVH (left ventricular hypertrophy) " 06/01/2020   • Acquired hypothyroidism 11/25/2019   • Otosclerosis of both ears 11/25/2019     Note Last Updated: 11/25/2019     Replaced bones in ears with metal pistons - NO MRI's     • MRI contraindicated due to metal implant 11/25/2019     Note Last Updated: 11/25/2019     Due to metal implants in inner ear     • Mixed hyperlipidemia 08/15/2019   • Allergic rhinitis 12/26/2018   • Herpes zoster 12/17/2018   • Loss of height 06/25/2018   • Encounter for general adult medical examination without abnormal findings 06/21/2017   • Visit for screening mammogram 06/21/2017   • Obesity with body mass index 30 or greater 06/21/2016   • Hemorrhoids, external 06/11/2012   • Obesity 06/11/2012   • Thyromegaly 06/11/2012   • Vaginitis, atrophic 06/11/2012   • Decreased white blood cell count 06/04/2012   • Essential hypertension 06/04/2012           Past Surgical History:   Procedure Laterality Date   • COLONOSCOPY      NEG = 2014, rech 2024   • INNER EAR SURGERY Bilateral    • THYROIDECTOMY, PARTIAL      Left thyroid mass= Benign   • TONSILLECTOMY     • TUBAL ABDOMINAL LIGATION       Current Outpatient Medications on File Prior to Visit   Medication Sig   • albuterol sulfate  (90 Base) MCG/ACT inhaler Inhale 2 puffs Every 6 (Six) Hours As Needed for Shortness of Air.   • aspirin 325 MG tablet Take 1 tablet by mouth Daily.   • atorvastatin (LIPITOR) 20 MG tablet Take 1 tablet by mouth Every Night. 200001   • celecoxib (CeleBREX) 200 MG capsule Take 1 capsule by mouth Daily.   • levothyroxine (SYNTHROID, LEVOTHROID) 112 MCG tablet Take 1 tablet by mouth Daily.   • triamterene-hydrochlorothiazide (MAXZIDE-25) 37.5-25 MG per tablet Take 1 tablet by mouth Daily.   • [DISCONTINUED] amLODIPine (NORVASC) 5 MG tablet Take 1 tablet by mouth Daily.   • [DISCONTINUED] Ibuprofen 200 MG capsule Take 4 capsules by mouth Daily As Needed.     No current facility-administered medications on file prior to visit.      No Known  "Allergies  Social History     Socioeconomic History   • Marital status:      Spouse name: Not on file   • Number of children: Not on file   • Years of education: Not on file   • Highest education level: Not on file   Tobacco Use   • Smoking status: Never Smoker   • Smokeless tobacco: Never Used   Substance and Sexual Activity   • Alcohol use: No     Frequency: Never   • Drug use: No     Family History   Problem Relation Age of Onset   • COPD Mother    • Heart disease Father    • Heart disease Brother    • Lymphoma Brother      The following portions of the patient's history were reviewed and updated as appropriate: allergies, current medications, past family history, past medical history, past social history, past surgical history and problem list.    Review of Systems   Constitutional: Negative for chills and fever.   Respiratory: Negative for cough, choking and wheezing.    Cardiovascular: Negative for palpitations and leg swelling.   Gastrointestinal: Negative for abdominal pain.   Neurological: Negative for headache.       Objective   /83 (BP Location: Left arm, Patient Position: Sitting, Cuff Size: Adult)   Pulse 73   Temp 96.9 °F (36.1 °C) (Infrared)   Ht 160 cm (62.99\")   Wt 82.6 kg (182 lb 3.2 oz)   SpO2 99%   BMI 32.28 kg/m²   Physical Exam  Constitutional:       Appearance: She is well-developed.      Comments: Wearing a face mask     HENT:      Head: Normocephalic and atraumatic.   Eyes:      Conjunctiva/sclera: Conjunctivae normal.   Neck:      Musculoskeletal: Normal range of motion.   Cardiovascular:      Rate and Rhythm: Normal rate.   Pulmonary:      Effort: Pulmonary effort is normal.   Musculoskeletal: Normal range of motion.   Skin:     General: Skin is warm and dry.      Findings: No rash.   Neurological:      Mental Status: She is alert and oriented to person, place, and time.   Psychiatric:         Behavior: Behavior normal.         Assessment/Plan   Diagnoses and all orders " for this visit:    1. Essential hypertension (Primary)  -     amLODIPine (NORVASC) 10 MG tablet; Take 1 tablet by mouth Daily.  Dispense: 90 tablet; Refill: 3    2. Mixed hyperlipidemia    3. Acquired hypothyroidism    4. Arthritis of right knee    5. LVH (left ventricular hypertrophy)    6. Need for influenza vaccination  -     Fluad Quad >65 years    7. Moderate persistent reactive airway disease without complication    I would like to increase her amlodipine to get better blood pressure control.  We reviewed her echo again as she had questions about the LVH.  I explained that the treatment for that is lowered blood pressure.  She would like to get a flu shot today.  We will do that today.  I also explained in detail with the results of her pulmonary function test meant.  Finally, her cholesterol and thyroid are under good control.  We will follow-up with her in about 6 months.             Return in about 6 months (around 4/5/2021).    Call with any problems or concerns before next visit

## 2020-10-05 ENCOUNTER — OFFICE VISIT (OUTPATIENT)
Dept: FAMILY MEDICINE CLINIC | Facility: CLINIC | Age: 65
End: 2020-10-05

## 2020-10-05 VITALS
HEIGHT: 63 IN | WEIGHT: 182.2 LBS | SYSTOLIC BLOOD PRESSURE: 142 MMHG | HEART RATE: 73 BPM | OXYGEN SATURATION: 99 % | TEMPERATURE: 96.9 F | DIASTOLIC BLOOD PRESSURE: 83 MMHG | BODY MASS INDEX: 32.28 KG/M2

## 2020-10-05 DIAGNOSIS — J45.40 MODERATE PERSISTENT REACTIVE AIRWAY DISEASE WITHOUT COMPLICATION: ICD-10-CM

## 2020-10-05 DIAGNOSIS — E78.2 MIXED HYPERLIPIDEMIA: ICD-10-CM

## 2020-10-05 DIAGNOSIS — M17.11 ARTHRITIS OF RIGHT KNEE: ICD-10-CM

## 2020-10-05 DIAGNOSIS — E03.9 ACQUIRED HYPOTHYROIDISM: ICD-10-CM

## 2020-10-05 DIAGNOSIS — Z23 NEED FOR INFLUENZA VACCINATION: ICD-10-CM

## 2020-10-05 DIAGNOSIS — I10 ESSENTIAL HYPERTENSION: Primary | ICD-10-CM

## 2020-10-05 DIAGNOSIS — I51.7 LVH (LEFT VENTRICULAR HYPERTROPHY): ICD-10-CM

## 2020-10-05 PROBLEM — J45.909 REACTIVE AIRWAY DISEASE WITHOUT COMPLICATION: Status: ACTIVE | Noted: 2020-10-05

## 2020-10-05 PROCEDURE — 99214 OFFICE O/P EST MOD 30 MIN: CPT | Performed by: FAMILY MEDICINE

## 2020-10-05 PROCEDURE — G0008 ADMIN INFLUENZA VIRUS VAC: HCPCS | Performed by: FAMILY MEDICINE

## 2020-10-05 PROCEDURE — 90694 VACC AIIV4 NO PRSRV 0.5ML IM: CPT | Performed by: FAMILY MEDICINE

## 2020-10-05 RX ORDER — AMLODIPINE BESYLATE 10 MG/1
10 TABLET ORAL DAILY
Qty: 90 TABLET | Refills: 3 | Status: SHIPPED | OUTPATIENT
Start: 2020-10-05 | End: 2020-12-07

## 2020-11-18 DIAGNOSIS — I10 ESSENTIAL HYPERTENSION: ICD-10-CM

## 2020-11-18 RX ORDER — AMLODIPINE BESYLATE 5 MG/1
TABLET ORAL
Qty: 30 TABLET | Refills: 0 | OUTPATIENT
Start: 2020-11-18

## 2020-11-30 ENCOUNTER — TELEPHONE (OUTPATIENT)
Dept: FAMILY MEDICINE CLINIC | Facility: CLINIC | Age: 65
End: 2020-11-30

## 2020-11-30 NOTE — TELEPHONE ENCOUNTER
Jonathan says her blood pressure was 127/72 this morning and that is about what it has been running. I did make her an appt to come in next Monday. She voiced understanding to cut back to 5mg Amlodipine/day.

## 2020-11-30 NOTE — TELEPHONE ENCOUNTER
Patient calling in this morning stating her Amlodipine was recently increased to 10mg. She says since last Weds she has been feeling dizzy and her ankles are swelling. She wants to know if the recent med increase could be causing it.

## 2020-11-30 NOTE — TELEPHONE ENCOUNTER
Please ask her what her blood pressure has been running.  Tell her to decrease the amlodipine back to 5 mg/day (she can cut the 10 mg tablets in half) and schedule a follow-up appointment later this week or early next week.  Swelling is, unfortunately, a possible side effect of the higher doses of amlodipine.  Thanks

## 2020-12-07 ENCOUNTER — OFFICE VISIT (OUTPATIENT)
Dept: FAMILY MEDICINE CLINIC | Facility: CLINIC | Age: 65
End: 2020-12-07

## 2020-12-07 VITALS
SYSTOLIC BLOOD PRESSURE: 140 MMHG | BODY MASS INDEX: 32.64 KG/M2 | OXYGEN SATURATION: 99 % | WEIGHT: 184.2 LBS | TEMPERATURE: 97.7 F | DIASTOLIC BLOOD PRESSURE: 84 MMHG | HEIGHT: 63 IN | HEART RATE: 78 BPM

## 2020-12-07 DIAGNOSIS — J45.40 MODERATE PERSISTENT REACTIVE AIRWAY DISEASE WITHOUT COMPLICATION: ICD-10-CM

## 2020-12-07 DIAGNOSIS — T88.7XXA MEDICATION SIDE EFFECT: ICD-10-CM

## 2020-12-07 DIAGNOSIS — I10 ESSENTIAL HYPERTENSION: Primary | ICD-10-CM

## 2020-12-07 DIAGNOSIS — I51.7 LVH (LEFT VENTRICULAR HYPERTROPHY): ICD-10-CM

## 2020-12-07 PROCEDURE — 99214 OFFICE O/P EST MOD 30 MIN: CPT | Performed by: FAMILY MEDICINE

## 2020-12-07 RX ORDER — AMLODIPINE BESYLATE 10 MG/1
5 TABLET ORAL DAILY
Status: SHIPPED
Start: 2020-12-07 | End: 2021-04-05

## 2020-12-07 NOTE — PROGRESS NOTES
Lenny Rodriguez is a 65 y.o. female.   Chief Complaint   Patient presents with   • Hypertension       History of Present Illness   Patient here to follow up for her blood pressure. She recently was instructed to decrease her Amlodipine to 5mg due to dizziness and swelling. She reports her dizziness and swelling has resolved. She does check her blood pressure at home first thing in morning, usually runs 120's/70's at that time.      Above referenced increase in her amlodipine was back in October.  This was following echocardiogram which showed left ventricular hypertrophy.  Apparently she is not able to tolerate higher doses of amlodipine or lower blood pressures.  Blood pressure of 120s over 70s at home is acceptable.  Blood pressure today in the office is still a little elevated at 140/84.    Had some swelling in her feet for maybe a month, but she was only lightheaded once or twice before calling and alerting us to this problem.    No longer lightheaded or dizzy.  The swelling is virtually completely resolved.  It is in the left leg and is nearly resolved in the right leg.  She has some right knee problems and finds that she keeps a little bit of swelling in the right leg all the time.    RAD-she states that she still gets short of breath with activities.  That shortness of breath is helped by the albuterol inhaler which she still uses periodically.  Overall the situation is largely unchanged.    Patient Active Problem List    Diagnosis Date Noted   • Reactive airway disease without complication 10/05/2020   • Arthritis of right knee 10/04/2020   • LVH (left ventricular hypertrophy) 06/01/2020   • Acquired hypothyroidism 11/25/2019   • Otosclerosis of both ears 11/25/2019     Note Last Updated: 11/25/2019     Replaced bones in ears with metal pistons - NO MRI's     • MRI contraindicated due to metal implant 11/25/2019     Note Last Updated: 11/25/2019     Due to metal implants in inner ear     • Mixed  hyperlipidemia 08/15/2019   • Allergic rhinitis 12/26/2018   • Herpes zoster 12/17/2018   • Loss of height 06/25/2018   • Encounter for general adult medical examination without abnormal findings 06/21/2017   • Visit for screening mammogram 06/21/2017   • Obesity with body mass index 30 or greater 06/21/2016   • Hemorrhoids, external 06/11/2012   • Obesity 06/11/2012   • Thyromegaly 06/11/2012   • Vaginitis, atrophic 06/11/2012   • Decreased white blood cell count 06/04/2012   • Essential hypertension 06/04/2012           Past Surgical History:   Procedure Laterality Date   • COLONOSCOPY      NEG = 2014, rech 2024   • INNER EAR SURGERY Bilateral    • THYROIDECTOMY, PARTIAL      Left thyroid mass= Benign   • TONSILLECTOMY     • TUBAL ABDOMINAL LIGATION       Current Outpatient Medications on File Prior to Visit   Medication Sig   • albuterol sulfate  (90 Base) MCG/ACT inhaler Inhale 2 puffs Every 6 (Six) Hours As Needed for Shortness of Air.   • aspirin 325 MG tablet Take 1 tablet by mouth Daily.   • atorvastatin (LIPITOR) 20 MG tablet Take 1 tablet by mouth Every Night. 200001   • levothyroxine (SYNTHROID, LEVOTHROID) 112 MCG tablet Take 1 tablet by mouth Daily.   • triamterene-hydrochlorothiazide (MAXZIDE-25) 37.5-25 MG per tablet Take 1 tablet by mouth Daily.   • [DISCONTINUED] amLODIPine (NORVASC) 10 MG tablet Take 1 tablet by mouth Daily. (Patient taking differently: Take 5 mg by mouth Daily.)   • celecoxib (CeleBREX) 200 MG capsule Take 1 capsule by mouth Daily.     No current facility-administered medications on file prior to visit.      No Known Allergies  Social History     Socioeconomic History   • Marital status:      Spouse name: Not on file   • Number of children: Not on file   • Years of education: Not on file   • Highest education level: Not on file   Tobacco Use   • Smoking status: Never Smoker   • Smokeless tobacco: Never Used   Substance and Sexual Activity   • Alcohol use: No      "Frequency: Never   • Drug use: No     Family History   Problem Relation Age of Onset   • COPD Mother    • Heart disease Father    • Heart disease Brother    • Lymphoma Brother      The following portions of the patient's history were reviewed and updated as appropriate: allergies, current medications, past family history, past medical history, past social history, past surgical history and problem list.    Review of Systems   Constitutional: Negative for chills and fever.   Cardiovascular: Negative for chest pain and leg swelling.   Gastrointestinal: Negative for abdominal pain.   Neurological: Negative for light-headedness and headache.       Objective   /84 (BP Location: Left arm, Patient Position: Sitting, Cuff Size: Adult)   Pulse 78   Temp 97.7 °F (36.5 °C) (Infrared)   Ht 160 cm (62.99\")   Wt 83.6 kg (184 lb 3.2 oz)   SpO2 99%   BMI 32.64 kg/m²   Physical Exam  Constitutional:       General: She is not in acute distress.     Appearance: Normal appearance. She is well-developed.      Comments: Wearing a face mask     HENT:      Head: Normocephalic and atraumatic.   Eyes:      Conjunctiva/sclera: Conjunctivae normal.   Neck:      Musculoskeletal: Normal range of motion.   Cardiovascular:      Rate and Rhythm: Normal rate and regular rhythm.      Heart sounds: No murmur.   Pulmonary:      Effort: Pulmonary effort is normal. No respiratory distress.      Breath sounds: Normal breath sounds. No wheezing.   Musculoskeletal: Normal range of motion.      Right lower leg: No edema.      Left lower leg: No edema.   Skin:     General: Skin is warm and dry.      Findings: No rash.   Neurological:      Mental Status: She is alert and oriented to person, place, and time.   Psychiatric:         Behavior: Behavior normal.         Assessment/Plan   Diagnoses and all orders for this visit:    1. Essential hypertension (Primary)  -     amLODIPine (NORVASC) 10 MG tablet; Take 0.5 tablets by mouth Daily.    2. " Medication side effect    3. LVH (left ventricular hypertrophy)    4. Moderate persistent reactive airway disease without complication    I suspect she has an element of whitecoat hypertension.  Blood pressure seem to be well controlled at home.  At any rate she was not able to tolerate the higher doses of amlodipine.  Continue amlodipine 5mg per day along with Maxzide.   Let me know if she has any other problems with this.  We will otherwise follow-up with her as planned in April.  Discussed the possibility of referring her to pulmonology.  She is open to this, but would like to wait several months.  We will reevaluate her in the spring as planned and make a decision about referring her to pulmonology then.           Return if symptoms worsen or fail to improve, for Keep follow-up as planned in April of next year..    Call with any problems or concerns before next visit

## 2020-12-28 DIAGNOSIS — I10 ESSENTIAL HYPERTENSION: ICD-10-CM

## 2021-01-01 RX ORDER — TRIAMTERENE AND HYDROCHLOROTHIAZIDE 37.5; 25 MG/1; MG/1
TABLET ORAL
Qty: 90 TABLET | Refills: 0 | Status: SHIPPED | OUTPATIENT
Start: 2021-01-01 | End: 2021-03-31

## 2021-01-20 ENCOUNTER — TELEPHONE (OUTPATIENT)
Dept: FAMILY MEDICINE CLINIC | Facility: CLINIC | Age: 66
End: 2021-01-20

## 2021-01-20 RX ORDER — PREDNISONE 20 MG/1
40 TABLET ORAL DAILY
Qty: 10 TABLET | Refills: 0 | Status: SHIPPED | OUTPATIENT
Start: 2021-01-20 | End: 2021-04-05

## 2021-01-20 NOTE — TELEPHONE ENCOUNTER
Please tell Jonathan that I have sent in a short course of a prednisone burst for her.  This will not make her sleepy and should help reduce the inflammation in the inner ear that causes vertigo and dizziness.  Hope this helps her.  Thanks

## 2021-01-20 NOTE — TELEPHONE ENCOUNTER
Patient called said that she has vertigo and was wanting to know if  could suggest or call something in for her. I advised patient  will probably want to evaluate her for this issue, she said this has happened before. She said that if something is called in she would like something that wont make her drowsy

## 2021-02-04 DIAGNOSIS — E03.9 ACQUIRED HYPOTHYROIDISM: ICD-10-CM

## 2021-02-04 DIAGNOSIS — E78.2 MIXED HYPERLIPIDEMIA: ICD-10-CM

## 2021-02-05 RX ORDER — LEVOTHYROXINE SODIUM 112 UG/1
TABLET ORAL
Qty: 90 TABLET | Refills: 1 | Status: SHIPPED | OUTPATIENT
Start: 2021-02-05 | End: 2021-07-22 | Stop reason: SDUPTHER

## 2021-02-05 RX ORDER — ATORVASTATIN CALCIUM 20 MG/1
TABLET, FILM COATED ORAL
Qty: 90 TABLET | Refills: 1 | Status: SHIPPED | OUTPATIENT
Start: 2021-02-05 | End: 2021-08-16

## 2021-03-28 DIAGNOSIS — I10 ESSENTIAL HYPERTENSION: ICD-10-CM

## 2021-03-29 RX ORDER — AMLODIPINE BESYLATE 5 MG/1
TABLET ORAL
Qty: 30 TABLET | Refills: 0 | Status: SHIPPED | OUTPATIENT
Start: 2021-03-29 | End: 2021-04-26

## 2021-03-31 DIAGNOSIS — I10 ESSENTIAL HYPERTENSION: ICD-10-CM

## 2021-03-31 RX ORDER — TRIAMTERENE AND HYDROCHLOROTHIAZIDE 37.5; 25 MG/1; MG/1
TABLET ORAL
Qty: 90 TABLET | Refills: 0 | Status: SHIPPED | OUTPATIENT
Start: 2021-03-31 | End: 2021-06-30

## 2021-04-05 ENCOUNTER — OFFICE VISIT (OUTPATIENT)
Dept: FAMILY MEDICINE CLINIC | Facility: CLINIC | Age: 66
End: 2021-04-05

## 2021-04-05 VITALS
TEMPERATURE: 98.4 F | HEIGHT: 63 IN | DIASTOLIC BLOOD PRESSURE: 82 MMHG | HEART RATE: 74 BPM | BODY MASS INDEX: 33.03 KG/M2 | WEIGHT: 186.4 LBS | OXYGEN SATURATION: 98 % | SYSTOLIC BLOOD PRESSURE: 137 MMHG

## 2021-04-05 DIAGNOSIS — I51.7 LVH (LEFT VENTRICULAR HYPERTROPHY): ICD-10-CM

## 2021-04-05 DIAGNOSIS — I10 ESSENTIAL HYPERTENSION: Primary | ICD-10-CM

## 2021-04-05 DIAGNOSIS — Z12.31 ENCOUNTER FOR SCREENING MAMMOGRAM FOR MALIGNANT NEOPLASM OF BREAST: ICD-10-CM

## 2021-04-05 DIAGNOSIS — E03.9 ACQUIRED HYPOTHYROIDISM: ICD-10-CM

## 2021-04-05 DIAGNOSIS — J45.40 MODERATE PERSISTENT REACTIVE AIRWAY DISEASE WITHOUT COMPLICATION: ICD-10-CM

## 2021-04-05 PROCEDURE — 99214 OFFICE O/P EST MOD 30 MIN: CPT | Performed by: FAMILY MEDICINE

## 2021-04-05 RX ORDER — ALBUTEROL SULFATE 90 UG/1
2 AEROSOL, METERED RESPIRATORY (INHALATION) EVERY 4 HOURS PRN
Qty: 18 G | Refills: 5 | Status: SHIPPED | OUTPATIENT
Start: 2021-04-05 | End: 2022-01-18 | Stop reason: SDUPTHER

## 2021-04-05 NOTE — PROGRESS NOTES
Lenny Rodriguez is a 65 y.o. female.   Chief Complaint   Patient presents with   • Hypertension       History of Present Illness   Patient is here for a f/u on htn.  Patient states she does not check blood pressure at home.   Tolerating her blood pressure medicines without side effects.  Again, she is not able to tolerate full dose amlodipine at 10 mg/day.  She does okay on 5 mg/day.  No swelling in her feet.  Blood pressure today is pretty good at 137/82.  She had LVH on echocardiogram.  Hopefully improved blood pressure control will begin to improve that.  No PND, no orthopnea.    Hypothyroidism-last TSH levels were about 6 months ago and were okay.    Reactive airway disease-please see previous notes for details.  She had a cardiac work-up in June of last year with a stress test that was normal.  She went on to have pulmonary function tests that suggested some reversible obstruction.  She keeps an albuterol inhaler around and uses it every so often when she gets short of breath.  She feels this is extraordinarily helpful.  She will be due for repeat mammogram after August 26 of 2021.  She would like to get an order for that.    She has general complaints of low back pain, knee pains, joint aches.  She sees her orthopedic doctor again soon and I have advised her to talk with him about Synvisc injections or something similar like that.        Patient Active Problem List    Diagnosis Date Noted   • Reactive airway disease without complication 10/05/2020   • Arthritis of right knee 10/04/2020   • LVH (left ventricular hypertrophy) 06/01/2020   • Acquired hypothyroidism 11/25/2019   • Otosclerosis of both ears 11/25/2019     Note Last Updated: 11/25/2019     Replaced bones in ears with metal pistons - NO MRI's     • MRI contraindicated due to metal implant 11/25/2019     Note Last Updated: 11/25/2019     Due to metal implants in inner ear     • Mixed hyperlipidemia 08/15/2019   • Allergic rhinitis  12/26/2018   • Herpes zoster 12/17/2018   • Loss of height 06/25/2018   • Encounter for general adult medical examination without abnormal findings 06/21/2017   • Visit for screening mammogram 06/21/2017   • Obesity with body mass index 30 or greater 06/21/2016   • Hemorrhoids, external 06/11/2012   • Obesity 06/11/2012   • Thyromegaly 06/11/2012   • Vaginitis, atrophic 06/11/2012   • Decreased white blood cell count 06/04/2012   • Essential hypertension 06/04/2012           Past Surgical History:   Procedure Laterality Date   • COLONOSCOPY      NEG = 2014, rech 2024   • INNER EAR SURGERY Bilateral    • THYROIDECTOMY, PARTIAL      Left thyroid mass= Benign   • TONSILLECTOMY     • TUBAL ABDOMINAL LIGATION       Current Outpatient Medications on File Prior to Visit   Medication Sig   • amLODIPine (NORVASC) 5 MG tablet Take 1 tablet by mouth once daily   • aspirin 325 MG tablet Take 1 tablet by mouth Daily.   • atorvastatin (LIPITOR) 20 MG tablet TAKE 1 TABLET BY MOUTH ONCE DAILY AT  NIGHT.   • celecoxib (CeleBREX) 200 MG capsule Take 1 capsule by mouth Daily.   • Euthyrox 112 MCG tablet Take 1 tablet by mouth once daily   • triamterene-hydrochlorothiazide (MAXZIDE-25) 37.5-25 MG per tablet Take 1 tablet by mouth once daily   • [DISCONTINUED] albuterol sulfate  (90 Base) MCG/ACT inhaler Inhale 2 puffs Every 6 (Six) Hours As Needed for Shortness of Air.   • [DISCONTINUED] amLODIPine (NORVASC) 10 MG tablet Take 0.5 tablets by mouth Daily.   • [DISCONTINUED] predniSONE (DELTASONE) 20 MG tablet Take 2 tablets by mouth Daily. x5 days     No current facility-administered medications on file prior to visit.     No Known Allergies  Social History     Socioeconomic History   • Marital status:      Spouse name: Not on file   • Number of children: Not on file   • Years of education: Not on file   • Highest education level: Not on file   Tobacco Use   • Smoking status: Never Smoker   • Smokeless tobacco: Never Used  "  Substance and Sexual Activity   • Alcohol use: No   • Drug use: No     Family History   Problem Relation Age of Onset   • COPD Mother    • Heart disease Father    • Heart disease Brother    • Lymphoma Brother        Review of Systems    Objective   /82 (BP Location: Right arm, Patient Position: Sitting, Cuff Size: Adult)   Pulse 74   Temp 98.4 °F (36.9 °C) (Infrared)   Ht 160 cm (62.99\")   Wt 84.6 kg (186 lb 6.4 oz)   SpO2 98%   BMI 33.03 kg/m²   Physical Exam  Constitutional:       General: She is not in acute distress.     Appearance: She is well-developed.      Comments: Wearing a face mask     HENT:      Head: Normocephalic and atraumatic.   Eyes:      Conjunctiva/sclera: Conjunctivae normal.   Cardiovascular:      Rate and Rhythm: Normal rate and regular rhythm.      Heart sounds: Normal heart sounds. No murmur heard.     Pulmonary:      Effort: Pulmonary effort is normal. No respiratory distress.      Breath sounds: Normal breath sounds.   Musculoskeletal:         General: Normal range of motion.      Cervical back: Normal range of motion.   Skin:     General: Skin is warm and dry.      Findings: No rash.   Neurological:      Mental Status: She is alert and oriented to person, place, and time.   Psychiatric:         Behavior: Behavior normal.         Assessment/Plan   Diagnoses and all orders for this visit:    1. Essential hypertension (Primary)  -     Comprehensive Metabolic Panel    2. LVH (left ventricular hypertrophy)    3. Acquired hypothyroidism  -     TSH    4. Moderate persistent reactive airway disease without complication  -     albuterol sulfate  (90 Base) MCG/ACT inhaler; Inhale 2 puffs Every 4 (Four) Hours As Needed for Shortness of Air.  Dispense: 18 g; Refill: 5    5. Encounter for screening mammogram for malignant neoplasm of breast  -     Mammo Screening Digital Tomosynthesis Bilateral With CAD; Future    Blood pressure is fairly well controlled.  Continue current doses " of blood pressure medications.  Check TSH to monitor thyroid function.  We will place an order for mammogram to be done sometime after August 26 of this year.  Refill the albuterol so she can continue to have that available.  We will decrease the dosing interval to every 4 hours as needed.  Also advised her she can occasionally take 3 puffs at a time if needed.  If she must continue to use this on a routine basis, I have suggested that we look into long-acting bronchodilators.  She is open to doing this, but does not want to do that at this time.           Return in about 6 months (around 10/5/2021), or if symptoms worsen or fail to improve, for B/P, breathing.    Call with any problems or concerns before next visit

## 2021-04-06 LAB
ALBUMIN SERPL-MCNC: 4.5 G/DL (ref 3.8–4.8)
ALBUMIN/GLOB SERPL: 2 {RATIO} (ref 1.2–2.2)
ALP SERPL-CCNC: 147 IU/L (ref 39–117)
ALT SERPL-CCNC: 24 IU/L (ref 0–32)
AST SERPL-CCNC: 22 IU/L (ref 0–40)
BILIRUB SERPL-MCNC: 0.2 MG/DL (ref 0–1.2)
BUN SERPL-MCNC: 23 MG/DL (ref 8–27)
BUN/CREAT SERPL: 27 (ref 12–28)
CALCIUM SERPL-MCNC: 10.8 MG/DL (ref 8.7–10.3)
CHLORIDE SERPL-SCNC: 105 MMOL/L (ref 96–106)
CO2 SERPL-SCNC: 25 MMOL/L (ref 20–29)
CREAT SERPL-MCNC: 0.86 MG/DL (ref 0.57–1)
GLOBULIN SER CALC-MCNC: 2.3 G/DL (ref 1.5–4.5)
GLUCOSE SERPL-MCNC: 91 MG/DL (ref 65–99)
POTASSIUM SERPL-SCNC: 3.7 MMOL/L (ref 3.5–5.2)
PROT SERPL-MCNC: 6.8 G/DL (ref 6–8.5)
SODIUM SERPL-SCNC: 142 MMOL/L (ref 134–144)
TSH SERPL DL<=0.005 MIU/L-ACNC: 1.14 UIU/ML (ref 0.45–4.5)

## 2021-04-25 DIAGNOSIS — I10 ESSENTIAL HYPERTENSION: ICD-10-CM

## 2021-04-26 RX ORDER — AMLODIPINE BESYLATE 5 MG/1
TABLET ORAL
Qty: 90 TABLET | Refills: 3 | Status: SHIPPED | OUTPATIENT
Start: 2021-04-26 | End: 2022-04-25

## 2021-06-28 DIAGNOSIS — I10 ESSENTIAL HYPERTENSION: ICD-10-CM

## 2021-06-28 RX ORDER — TRIAMTERENE AND HYDROCHLOROTHIAZIDE 37.5; 25 MG/1; MG/1
TABLET ORAL
Qty: 90 TABLET | Refills: 0 | OUTPATIENT
Start: 2021-06-28

## 2021-06-30 DIAGNOSIS — I10 ESSENTIAL HYPERTENSION: ICD-10-CM

## 2021-06-30 RX ORDER — TRIAMTERENE AND HYDROCHLOROTHIAZIDE 37.5; 25 MG/1; MG/1
TABLET ORAL
Qty: 90 TABLET | Refills: 3 | Status: SHIPPED | OUTPATIENT
Start: 2021-06-30 | End: 2022-05-06

## 2021-07-22 DIAGNOSIS — E03.9 ACQUIRED HYPOTHYROIDISM: ICD-10-CM

## 2021-07-22 RX ORDER — LEVOTHYROXINE SODIUM 112 UG/1
TABLET ORAL
Qty: 90 TABLET | Refills: 3 | Status: SHIPPED | OUTPATIENT
Start: 2021-07-22 | End: 2022-08-08

## 2021-08-16 DIAGNOSIS — E78.2 MIXED HYPERLIPIDEMIA: ICD-10-CM

## 2021-08-16 RX ORDER — ATORVASTATIN CALCIUM 20 MG/1
TABLET, FILM COATED ORAL
Qty: 90 TABLET | Refills: 0 | Status: SHIPPED | OUTPATIENT
Start: 2021-08-16 | End: 2021-11-08

## 2021-08-30 ENCOUNTER — HOSPITAL ENCOUNTER (OUTPATIENT)
Dept: MAMMOGRAPHY | Facility: HOSPITAL | Age: 66
Discharge: HOME | End: 2021-08-30
Admitting: FAMILY MEDICINE

## 2021-08-30 DIAGNOSIS — Z12.31 ENCOUNTER FOR SCREENING MAMMOGRAM FOR MALIGNANT NEOPLASM OF BREAST: ICD-10-CM

## 2021-08-30 PROCEDURE — 77067 SCR MAMMO BI INCL CAD: CPT

## 2021-08-30 PROCEDURE — 77063 BREAST TOMOSYNTHESIS BI: CPT

## 2021-09-30 ENCOUNTER — TELEPHONE (OUTPATIENT)
Dept: FAMILY MEDICINE CLINIC | Facility: CLINIC | Age: 66
End: 2021-09-30

## 2021-09-30 DIAGNOSIS — E03.9 ACQUIRED HYPOTHYROIDISM: ICD-10-CM

## 2021-09-30 DIAGNOSIS — E78.2 MIXED HYPERLIPIDEMIA: ICD-10-CM

## 2021-09-30 DIAGNOSIS — I10 ESSENTIAL HYPERTENSION: Primary | ICD-10-CM

## 2021-09-30 DIAGNOSIS — R73.9 HYPERGLYCEMIA: ICD-10-CM

## 2021-09-30 NOTE — TELEPHONE ENCOUNTER
Pt is on schedule for tomorrow and there are no labs ordered in chart.  Pt has f/u on 10/11/21.  Please order appropriate labs.  Thanks!

## 2021-10-02 LAB
ALBUMIN SERPL-MCNC: 4.1 G/DL (ref 3.8–4.8)
ALBUMIN/GLOB SERPL: 2.1 {RATIO} (ref 1.2–2.2)
ALP SERPL-CCNC: 143 IU/L (ref 44–121)
ALT SERPL-CCNC: 25 IU/L (ref 0–32)
AST SERPL-CCNC: 21 IU/L (ref 0–40)
BASOPHILS # BLD AUTO: 0.1 X10E3/UL (ref 0–0.2)
BASOPHILS NFR BLD AUTO: 1 %
BILIRUB SERPL-MCNC: 0.4 MG/DL (ref 0–1.2)
BUN SERPL-MCNC: 18 MG/DL (ref 8–27)
BUN/CREAT SERPL: 23 (ref 12–28)
CALCIUM SERPL-MCNC: 10.7 MG/DL (ref 8.7–10.3)
CHLORIDE SERPL-SCNC: 105 MMOL/L (ref 96–106)
CHOLEST SERPL-MCNC: 169 MG/DL (ref 100–199)
CO2 SERPL-SCNC: 25 MMOL/L (ref 20–29)
CREAT SERPL-MCNC: 0.79 MG/DL (ref 0.57–1)
EOSINOPHIL # BLD AUTO: 0.2 X10E3/UL (ref 0–0.4)
EOSINOPHIL NFR BLD AUTO: 4 %
ERYTHROCYTE [DISTWIDTH] IN BLOOD BY AUTOMATED COUNT: 12 % (ref 11.7–15.4)
GLOBULIN SER CALC-MCNC: 2 G/DL (ref 1.5–4.5)
GLUCOSE SERPL-MCNC: 90 MG/DL (ref 65–99)
HBA1C MFR BLD: 5.6 % (ref 4.8–5.6)
HCT VFR BLD AUTO: 40.7 % (ref 34–46.6)
HDLC SERPL-MCNC: 55 MG/DL
HGB BLD-MCNC: 13.7 G/DL (ref 11.1–15.9)
IMM GRANULOCYTES # BLD AUTO: 0 X10E3/UL (ref 0–0.1)
IMM GRANULOCYTES NFR BLD AUTO: 0 %
LDLC SERPL CALC-MCNC: 96 MG/DL (ref 0–99)
LYMPHOCYTES # BLD AUTO: 1.1 X10E3/UL (ref 0.7–3.1)
LYMPHOCYTES NFR BLD AUTO: 24 %
MCH RBC QN AUTO: 30.7 PG (ref 26.6–33)
MCHC RBC AUTO-ENTMCNC: 33.7 G/DL (ref 31.5–35.7)
MCV RBC AUTO: 91 FL (ref 79–97)
MONOCYTES # BLD AUTO: 0.4 X10E3/UL (ref 0.1–0.9)
MONOCYTES NFR BLD AUTO: 8 %
NEUTROPHILS # BLD AUTO: 3 X10E3/UL (ref 1.4–7)
NEUTROPHILS NFR BLD AUTO: 63 %
PLATELET # BLD AUTO: 228 X10E3/UL (ref 150–450)
POTASSIUM SERPL-SCNC: 3.6 MMOL/L (ref 3.5–5.2)
PROT SERPL-MCNC: 6.1 G/DL (ref 6–8.5)
RBC # BLD AUTO: 4.46 X10E6/UL (ref 3.77–5.28)
SODIUM SERPL-SCNC: 143 MMOL/L (ref 134–144)
T3FREE SERPL-MCNC: 3.3 PG/ML (ref 2–4.4)
T4 FREE SERPL-MCNC: 1.51 NG/DL (ref 0.82–1.77)
TRIGL SERPL-MCNC: 99 MG/DL (ref 0–149)
TSH SERPL DL<=0.005 MIU/L-ACNC: 0.52 UIU/ML (ref 0.45–4.5)
VLDLC SERPL CALC-MCNC: 18 MG/DL (ref 5–40)
WBC # BLD AUTO: 4.7 X10E3/UL (ref 3.4–10.8)

## 2021-10-10 NOTE — PROGRESS NOTES
Lenny Rodriguez is a 66 y.o. female.   No chief complaint on file.      History of Present Illness         Patient Active Problem List    Diagnosis Date Noted   • Reactive airway disease without complication 10/05/2020   • Arthritis of right knee 10/04/2020   • LVH (left ventricular hypertrophy) 06/01/2020   • Acquired hypothyroidism 11/25/2019   • Otosclerosis of both ears 11/25/2019     Note Last Updated: 11/25/2019     Replaced bones in ears with metal pistons - NO MRI's     • MRI contraindicated due to metal implant 11/25/2019     Note Last Updated: 11/25/2019     Due to metal implants in inner ear     • Mixed hyperlipidemia 08/15/2019   • Allergic rhinitis 12/26/2018   • Herpes zoster 12/17/2018   • Loss of height 06/25/2018   • Encounter for general adult medical examination without abnormal findings 06/21/2017   • Visit for screening mammogram 06/21/2017   • Obesity with body mass index 30 or greater 06/21/2016   • Hemorrhoids, external 06/11/2012   • Obesity 06/11/2012   • Thyromegaly 06/11/2012   • Vaginitis, atrophic 06/11/2012   • Decreased white blood cell count 06/04/2012   • Essential hypertension 06/04/2012           Past Surgical History:   Procedure Laterality Date   • COLONOSCOPY      NEG = 2014, rech 2024   • INNER EAR SURGERY Bilateral    • THYROIDECTOMY, PARTIAL      Left thyroid mass= Benign   • TONSILLECTOMY     • TUBAL ABDOMINAL LIGATION       Current Outpatient Medications on File Prior to Visit   Medication Sig   • albuterol sulfate  (90 Base) MCG/ACT inhaler Inhale 2 puffs Every 4 (Four) Hours As Needed for Shortness of Air.   • amLODIPine (NORVASC) 5 MG tablet Take 1 tablet by mouth once daily   • aspirin 325 MG tablet Take 1 tablet by mouth Daily.   • atorvastatin (LIPITOR) 20 MG tablet TAKE 1 TABLET BY MOUTH ONCE DAILY AT NIGHT   • celecoxib (CeleBREX) 200 MG capsule Take 1 capsule by mouth Daily.   • Euthyrox 112 MCG tablet Take 1 tablet by mouth once daily   •  triamterene-hydrochlorothiazide (MAXZIDE-25) 37.5-25 MG per tablet Take 1 tablet by mouth once daily   • [DISCONTINUED] Euthyrox 112 MCG tablet Take 1 tablet by mouth once daily     No current facility-administered medications on file prior to visit.     No Known Allergies  Social History     Socioeconomic History   • Marital status:    Tobacco Use   • Smoking status: Never Smoker   • Smokeless tobacco: Never Used   Substance and Sexual Activity   • Alcohol use: No   • Drug use: No     Family History   Problem Relation Age of Onset   • COPD Mother    • Heart disease Father    • Heart disease Brother    • Lymphoma Brother    • Colon cancer Paternal Grandmother        Review of Systems    Objective   There were no vitals taken for this visit.  Physical Exam      Telephone on 09/30/2021   Component Date Value Ref Range Status   • Hemoglobin A1C 10/01/2021 5.6  4.8 - 5.6 % Final    Comment:          Prediabetes: 5.7 - 6.4           Diabetes: >6.4           Glycemic control for adults with diabetes: <7.0     • WBC 10/01/2021 4.7  3.4 - 10.8 x10E3/uL Final   • RBC 10/01/2021 4.46  3.77 - 5.28 x10E6/uL Final   • Hemoglobin 10/01/2021 13.7  11.1 - 15.9 g/dL Final   • Hematocrit 10/01/2021 40.7  34.0 - 46.6 % Final   • MCV 10/01/2021 91  79 - 97 fL Final   • MCH 10/01/2021 30.7  26.6 - 33.0 pg Final   • MCHC 10/01/2021 33.7  31.5 - 35.7 g/dL Final   • RDW 10/01/2021 12.0  11.7 - 15.4 % Final   • Platelets 10/01/2021 228  150 - 450 x10E3/uL Final   • Neutrophil Rel % 10/01/2021 63  Not Estab. % Final   • Lymphocyte Rel % 10/01/2021 24  Not Estab. % Final   • Monocyte Rel % 10/01/2021 8  Not Estab. % Final   • Eosinophil Rel % 10/01/2021 4  Not Estab. % Final   • Basophil Rel % 10/01/2021 1  Not Estab. % Final   • Neutrophils Absolute 10/01/2021 3.0  1.4 - 7.0 x10E3/uL Final   • Lymphocytes Absolute 10/01/2021 1.1  0.7 - 3.1 x10E3/uL Final   • Monocytes Absolute 10/01/2021 0.4  0.1 - 0.9 x10E3/uL Final   • Eosinophils  Absolute 10/01/2021 0.2  0.0 - 0.4 x10E3/uL Final   • Basophils Absolute 10/01/2021 0.1  0.0 - 0.2 x10E3/uL Final   • Immature Granulocyte Rel % 10/01/2021 0  Not Estab. % Final   • Immature Grans Absolute 10/01/2021 0.0  0.0 - 0.1 x10E3/uL Final   • Glucose 10/01/2021 90  65 - 99 mg/dL Final   • BUN 10/01/2021 18  8 - 27 mg/dL Final   • Creatinine 10/01/2021 0.79  0.57 - 1.00 mg/dL Final   • eGFR Non  Am 10/01/2021 77  >59 mL/min/1.73 Final   • eGFR African Am 10/01/2021 89  >59 mL/min/1.73 Final    Comment: **[facility] currently reports eGFR in compliance with the current**    recommendations of the [employer]. [facility] will    update reporting as new guidelines are published from the NKF-ASN    Task force.     • BUN/Creatinine Ratio 10/01/2021 23  12 - 28 Final   • Sodium 10/01/2021 143  134 - 144 mmol/L Final   • Potassium 10/01/2021 3.6  3.5 - 5.2 mmol/L Final   • Chloride 10/01/2021 105  96 - 106 mmol/L Final   • Total CO2 10/01/2021 25  20 - 29 mmol/L Final   • Calcium 10/01/2021 10.7* 8.7 - 10.3 mg/dL Final   • Total Protein 10/01/2021 6.1  6.0 - 8.5 g/dL Final   • Albumin 10/01/2021 4.1  3.8 - 4.8 g/dL Final   • Globulin 10/01/2021 2.0  1.5 - 4.5 g/dL Final   • A/G Ratio 10/01/2021 2.1  1.2 - 2.2 Final   • Total Bilirubin 10/01/2021 0.4  0.0 - 1.2 mg/dL Final   • Alkaline Phosphatase 10/01/2021 143* 44 - 121 IU/L Final                  **Please note reference interval change**   • AST (SGOT) 10/01/2021 21  0 - 40 IU/L Final   • ALT (SGPT) 10/01/2021 25  0 - 32 IU/L Final   • TSH 10/01/2021 0.520  0.450 - 4.500 uIU/mL Final   • Free T4 10/01/2021 1.51  0.82 - 1.77 ng/dL Final   • T3, Free 10/01/2021 3.3  2.0 - 4.4 pg/mL Final   • Total Cholesterol 10/01/2021 169  100 - 199 mg/dL Final   • Triglycerides 10/01/2021 99  0 - 149 mg/dL Final   • HDL Cholesterol 10/01/2021 55  >39 mg/dL Final   • VLDL Cholesterol Michael 10/01/2021 18  5 - 40 mg/dL Final   • LDL Chol Calc ([facility]) 10/01/2021 96  0 -  99 mg/dL Final         Assessment/Plan   Diagnoses and all orders for this visit:    1. Acquired hypothyroidism (Primary)    2. Mixed hyperlipidemia    3. Essential hypertension    4. LVH (left ventricular hypertrophy)                 Call with any problems or concerns before next visit  No follow-ups on file.      Much of this report is an electronic transcription of spoken language to printed text using Dragon dictation software.  As such, the subtleties and finesse of spoken language may permit erroneous, or at times, nonsensical words or phrases to be inadvertently transcribed; thus changes may be made at a later date to rectify these errors.

## 2021-10-10 NOTE — PROGRESS NOTES
The ABCs of the Annual Wellness Visit  Subsequent Medicare Wellness Visit    Chief Complaint   Patient presents with   • Medicare Wellness-Initial Visit   • Hypertension      Subjective    History of Present Illness:  Jonathna Rodriguez is a 66 y.o. female who presents for a Subsequent Medicare Wellness Visit.    The following portions of the patient's history were reviewed and   updated as appropriate: allergies, current medications, past family history, past medical history, past social history, past surgical history and problem list.    Compared to one year ago, the patient feels her physical   health is better.    Compared to one year ago, the patient feels her mental   health is the same.    Recent Hospitalizations:  She was not admitted to the hospital during the last year.     Separate identifiable service provided during her Medicare wellness evaluation today is providing advice on over-the-counter treatment of GERD, discussing and reevaluating blood pressure, discussing her LVH, reactive airway disease, and reviewing lab work with her as below.  At this visit her blood pressure was also noted to be under reasonable control on current medications.  She also had labs done prior to this visit to monitor the status of her cholesterol, thyroid disease, kidneys, liver.  Everything was good and is reviewed with her as below.    Previous complaints of fatigue and swelling in her feet and shortness of breath have been evaluated.  Pulmonary function test showed mild reactive airway disease and echocardiogram showed LVH, which is most likely from her hypertension.  The swelling in her feet has nearly resolved.  She does not follow her blood pressure at home, but blood pressure remained stable at in office checks.  Her shortness of breath is easily managed with as needed use of albuterol.  She is not having any side effects of her medications.     She has a new complaint of retrosternal pyrosis that occurs various times  throughout the day.  The symptoms respond very quickly to Tums and other antacids.  She asks if there is anything else over-the-counter she can take.        Current Medical Providers:  Patient Care Team:  Kerri Almanza MD as PCP - General (Family Medicine)  Bernadette Israel as Technologist    Outpatient Medications Prior to Visit   Medication Sig Dispense Refill   • albuterol sulfate  (90 Base) MCG/ACT inhaler Inhale 2 puffs Every 4 (Four) Hours As Needed for Shortness of Air. 18 g 5   • amLODIPine (NORVASC) 5 MG tablet Take 1 tablet by mouth once daily 90 tablet 3   • aspirin 325 MG tablet Take 1 tablet by mouth Daily.     • atorvastatin (LIPITOR) 20 MG tablet TAKE 1 TABLET BY MOUTH ONCE DAILY AT NIGHT 90 tablet 0   • celecoxib (CeleBREX) 200 MG capsule Take 1 capsule by mouth Daily. 30 capsule 5   • Euthyrox 112 MCG tablet Take 1 tablet by mouth once daily 90 tablet 3   • triamterene-hydrochlorothiazide (MAXZIDE-25) 37.5-25 MG per tablet Take 1 tablet by mouth once daily 90 tablet 3     No facility-administered medications prior to visit.       No opioid medication identified on active medication list. I have reviewed chart for other potential  high risk medication/s and harmful drug interactions in the elderly.          Aspirin is on active medication list. Aspirin use is indicated based on review of current medical condition/s. Pros and cons of this therapy have been discussed today. Benefits of this medication outweigh potential harm.  Patient has been encouraged to continue taking this medication.  .      Patient Active Problem List   Diagnosis   • Allergic rhinitis   • Decreased white blood cell count   • Encounter for general adult medical examination without abnormal findings   • Hemorrhoids, external   • Herpes zoster   • Mixed hyperlipidemia   • Essential hypertension   • Loss of height   • Obesity with body mass index 30 or greater   • Obesity   • Thyromegaly   • Vaginitis, atrophic   •  "Visit for screening mammogram   • Acquired hypothyroidism   • Otosclerosis of both ears   • MRI contraindicated due to metal implant   • LVH (left ventricular hypertrophy)   • Arthritis of right knee   • Reactive airway disease without complication   • Chronic GERD     Advance Care Planning  Advance Directive is not on file.  ACP discussion was declined by the patient. Patient does not have an advance directive, declines further assistance.          Objective    Vitals:    10/11/21 0829 10/11/21 0832   BP: 148/62 136/70   BP Location: Left arm Left arm   Patient Position: Sitting Sitting   Cuff Size: Adult Adult   Pulse: 79    Temp: 98.1 °F (36.7 °C)    TempSrc: Oral    SpO2: 97%    Weight: 83.5 kg (184 lb)    Height: 160 cm (62.99\")      Body mass index is 32.6 kg/m².  BMI has not been calculated during today's encounter.     Does the patient have evidence of cognitive impairment? No    Physical Exam  Constitutional:       General: She is not in acute distress.     Appearance: She is well-developed. She is not toxic-appearing.      Comments: Wearing a face mask     HENT:      Head: Normocephalic and atraumatic.   Eyes:      Conjunctiva/sclera: Conjunctivae normal.   Cardiovascular:      Rate and Rhythm: Normal rate and regular rhythm.      Heart sounds: Normal heart sounds. No murmur heard.      Pulmonary:      Effort: Pulmonary effort is normal. No respiratory distress.      Breath sounds: Normal breath sounds. No wheezing, rhonchi or rales.   Musculoskeletal:         General: Normal range of motion.      Cervical back: Normal range of motion.      Right lower leg: No edema.      Left lower leg: No edema.   Skin:     General: Skin is warm and dry.      Findings: No rash.   Neurological:      Mental Status: She is alert and oriented to person, place, and time.   Psychiatric:         Mood and Affect: Mood normal.         Behavior: Behavior normal.         Thought Content: Thought content normal.             No " visits with results within 1 Day(s) from this visit.   Latest known visit with results is:   Telephone on 09/30/2021   Component Date Value Ref Range Status   • Hemoglobin A1C 10/01/2021 5.6  4.8 - 5.6 % Final    Comment:          Prediabetes: 5.7 - 6.4           Diabetes: >6.4           Glycemic control for adults with diabetes: <7.0     • WBC 10/01/2021 4.7  3.4 - 10.8 x10E3/uL Final   • RBC 10/01/2021 4.46  3.77 - 5.28 x10E6/uL Final   • Hemoglobin 10/01/2021 13.7  11.1 - 15.9 g/dL Final   • Hematocrit 10/01/2021 40.7  34.0 - 46.6 % Final   • MCV 10/01/2021 91  79 - 97 fL Final   • MCH 10/01/2021 30.7  26.6 - 33.0 pg Final   • MCHC 10/01/2021 33.7  31.5 - 35.7 g/dL Final   • RDW 10/01/2021 12.0  11.7 - 15.4 % Final   • Platelets 10/01/2021 228  150 - 450 x10E3/uL Final   • Neutrophil Rel % 10/01/2021 63  Not Estab. % Final   • Lymphocyte Rel % 10/01/2021 24  Not Estab. % Final   • Monocyte Rel % 10/01/2021 8  Not Estab. % Final   • Eosinophil Rel % 10/01/2021 4  Not Estab. % Final   • Basophil Rel % 10/01/2021 1  Not Estab. % Final   • Neutrophils Absolute 10/01/2021 3.0  1.4 - 7.0 x10E3/uL Final   • Lymphocytes Absolute 10/01/2021 1.1  0.7 - 3.1 x10E3/uL Final   • Monocytes Absolute 10/01/2021 0.4  0.1 - 0.9 x10E3/uL Final   • Eosinophils Absolute 10/01/2021 0.2  0.0 - 0.4 x10E3/uL Final   • Basophils Absolute 10/01/2021 0.1  0.0 - 0.2 x10E3/uL Final   • Immature Granulocyte Rel % 10/01/2021 0  Not Estab. % Final   • Immature Grans Absolute 10/01/2021 0.0  0.0 - 0.1 x10E3/uL Final   • Glucose 10/01/2021 90  65 - 99 mg/dL Final   • BUN 10/01/2021 18  8 - 27 mg/dL Final   • Creatinine 10/01/2021 0.79  0.57 - 1.00 mg/dL Final   • eGFR Non  Am 10/01/2021 77  >59 mL/min/1.73 Final   • eGFR African Am 10/01/2021 89  >59 mL/min/1.73 Final    Comment: **[facility] currently reports eGFR in compliance with the current**    recommendations of the [employer]. [facility] will    update reporting as new  guidelines are published from the NKF-ASN    Task force.     • BUN/Creatinine Ratio 10/01/2021 23  12 - 28 Final   • Sodium 10/01/2021 143  134 - 144 mmol/L Final   • Potassium 10/01/2021 3.6  3.5 - 5.2 mmol/L Final   • Chloride 10/01/2021 105  96 - 106 mmol/L Final   • Total CO2 10/01/2021 25  20 - 29 mmol/L Final   • Calcium 10/01/2021 10.7* 8.7 - 10.3 mg/dL Final   • Total Protein 10/01/2021 6.1  6.0 - 8.5 g/dL Final   • Albumin 10/01/2021 4.1  3.8 - 4.8 g/dL Final   • Globulin 10/01/2021 2.0  1.5 - 4.5 g/dL Final   • A/G Ratio 10/01/2021 2.1  1.2 - 2.2 Final   • Total Bilirubin 10/01/2021 0.4  0.0 - 1.2 mg/dL Final   • Alkaline Phosphatase 10/01/2021 143* 44 - 121 IU/L Final                  **Please note reference interval change**   • AST (SGOT) 10/01/2021 21  0 - 40 IU/L Final   • ALT (SGPT) 10/01/2021 25  0 - 32 IU/L Final   • TSH 10/01/2021 0.520  0.450 - 4.500 uIU/mL Final   • Free T4 10/01/2021 1.51  0.82 - 1.77 ng/dL Final   • T3, Free 10/01/2021 3.3  2.0 - 4.4 pg/mL Final   • Total Cholesterol 10/01/2021 169  100 - 199 mg/dL Final   • Triglycerides 10/01/2021 99  0 - 149 mg/dL Final   • HDL Cholesterol 10/01/2021 55  >39 mg/dL Final   • VLDL Cholesterol Michael 10/01/2021 18  5 - 40 mg/dL Final   • LDL Chol Calc (NIH) 10/01/2021 96  0 - 99 mg/dL Final              HEALTH RISK ASSESSMENT    Smoking Status:  Social History     Tobacco Use   Smoking Status Never Smoker   Smokeless Tobacco Never Used     Alcohol Consumption:  Social History     Substance and Sexual Activity   Alcohol Use No     Fall Risk Screen:    STEADI Fall Risk Assessment has not been completed.    Depression Screening:  PHQ-2/PHQ-9 Depression Screening 10/11/2021   Little interest or pleasure in doing things 0   Feeling down, depressed, or hopeless 0   Trouble falling or staying asleep, or sleeping too much 0   Feeling tired or having little energy 1   Poor appetite or overeating 0   Feeling bad about yourself - or that you are a failure  or have let yourself or your family down 0   Trouble concentrating on things, such as reading the newspaper or watching television 0   Moving or speaking so slowly that other people could have noticed. Or the opposite - being so fidgety or restless that you have been moving around a lot more than usual 0   Thoughts that you would be better off dead, or of hurting yourself in some way 0   Total Score 1       Health Habits and Functional and Cognitive Screening:  Functional & Cognitive Status 10/11/2021   Do you have difficulty preparing food and eating? No   Do you have difficulty bathing yourself, getting dressed or grooming yourself? No   Do you have difficulty using the toilet? No   Do you have difficulty moving around from place to place? Yes   Do you have trouble with steps or getting out of a bed or a chair? No   Current Diet Well Balanced Diet   Dental Exam Not up to date   Eye Exam Up to date   Exercise (times per week) 7 times per week   Current Exercises Include House Cleaning   Do you need help using the phone?  No   Are you deaf or do you have serious difficulty hearing?  No   Do you need help with transportation? No   Do you need help shopping? No   Do you need help preparing meals?  No   Do you need help with housework?  No   Do you need help with laundry? No   Do you need help taking your medications? No   Do you need help managing money? No   Do you ever drive or ride in a car without wearing a seat belt? No   Have you felt unusual stress, anger or loneliness in the last month? No   Who do you live with? Spouse   If you need help, do you have trouble finding someone available to you? No   Have you been bothered in the last four weeks by sexual problems? No       Age-appropriate Screening Schedule:  Refer to the list below for future screening recommendations based on patient's age, sex and/or medical conditions. Orders for these recommended tests are listed in the plan section. The patient has been  provided with a written plan.    Health Maintenance   Topic Date Due   • ZOSTER VACCINE (2 of 3) 03/14/2018   • DXA SCAN  08/17/2020   • LIPID PANEL  10/01/2022   • MAMMOGRAM  08/30/2023   • TDAP/TD VACCINES (2 - Td or Tdap) 08/17/2027   • INFLUENZA VACCINE  Completed              Assessment/Plan   CMS Preventative Services Quick Reference  Risk Factors Identified During Encounter  Cardiovascular Disease  Chronic Pain   Dementia/Memory   Depression/Dysphoria  Fall Risk-High or Moderate  Immunizations Discussed/Encouraged (specific Immunizations; Influenza, Pneumococcal 23, Shingrix and COVID19  Inactivity/Sedentary  Polypharmacy  The above risks/problems have been discussed with the patient.  Follow up actions/plans if indicated are seen below in the Assessment/Plan Section.  Pertinent information has been shared with the patient in the After Visit Summary.    Diagnoses and all orders for this visit:    1. Physical exam, annual (Primary)    2. Acquired hypothyroidism    3. Mixed hyperlipidemia    4. Essential hypertension    5. LVH (left ventricular hypertrophy)    6. Chronic GERD    7. Need for prophylactic vaccination and inoculation against influenza  -     Fluzone High-Dose 65+yrs    8. Moderate persistent reactive airway disease without complication    9. Flu vaccine need    Regarding her preventive care, she had her mammogram a few months ago.  Last DEXA scan was actually June 2018.  She is due for another one but would like to wait and do that at the same time as her mammogram next summer.  I think that is okay.  She will be due for another colonoscopy in 2024.  She is due for a flu vaccine today.  This will be given.  She had Zostavax but is interested in getting Shingrix.  She will get that done at the local pharmacy.      Other multiple chronic medical problems as above are stable and decision is made to continue current prescription medication at current doses including Euthyrox 112 mcg a day for  hypothyroidism, Maxide, amlodipine at current doses for her hypertension and Lipitor 20 mg a day for hyper lipidemia.  Continue as needed use of albuterol for reactive airway disease.  I have also recommended over-the-counter famotidine daily to control her retrosternal pyrosis symptoms.      Follow Up:   Return in about 6 months (around 4/11/2022).     An After Visit Summary and PPPS were made available to the patient.

## 2021-10-11 ENCOUNTER — OFFICE VISIT (OUTPATIENT)
Dept: FAMILY MEDICINE CLINIC | Facility: CLINIC | Age: 66
End: 2021-10-11

## 2021-10-11 VITALS
DIASTOLIC BLOOD PRESSURE: 70 MMHG | WEIGHT: 184 LBS | TEMPERATURE: 98.1 F | HEIGHT: 63 IN | BODY MASS INDEX: 32.6 KG/M2 | HEART RATE: 79 BPM | SYSTOLIC BLOOD PRESSURE: 136 MMHG | OXYGEN SATURATION: 97 %

## 2021-10-11 DIAGNOSIS — Z23 NEED FOR PROPHYLACTIC VACCINATION AND INOCULATION AGAINST INFLUENZA: ICD-10-CM

## 2021-10-11 DIAGNOSIS — I10 ESSENTIAL HYPERTENSION: ICD-10-CM

## 2021-10-11 DIAGNOSIS — Z23 FLU VACCINE NEED: ICD-10-CM

## 2021-10-11 DIAGNOSIS — J45.40 MODERATE PERSISTENT REACTIVE AIRWAY DISEASE WITHOUT COMPLICATION: ICD-10-CM

## 2021-10-11 DIAGNOSIS — I51.7 LVH (LEFT VENTRICULAR HYPERTROPHY): ICD-10-CM

## 2021-10-11 DIAGNOSIS — Z00.00 PHYSICAL EXAM, ANNUAL: Primary | ICD-10-CM

## 2021-10-11 DIAGNOSIS — K21.9 CHRONIC GERD: ICD-10-CM

## 2021-10-11 DIAGNOSIS — E03.9 ACQUIRED HYPOTHYROIDISM: ICD-10-CM

## 2021-10-11 DIAGNOSIS — E78.2 MIXED HYPERLIPIDEMIA: ICD-10-CM

## 2021-10-11 PROCEDURE — 90662 IIV NO PRSV INCREASED AG IM: CPT | Performed by: FAMILY MEDICINE

## 2021-10-11 PROCEDURE — 99214 OFFICE O/P EST MOD 30 MIN: CPT | Performed by: FAMILY MEDICINE

## 2021-10-11 PROCEDURE — G0439 PPPS, SUBSEQ VISIT: HCPCS | Performed by: FAMILY MEDICINE

## 2021-10-11 PROCEDURE — G0008 ADMIN INFLUENZA VIRUS VAC: HCPCS | Performed by: FAMILY MEDICINE

## 2021-11-08 DIAGNOSIS — E78.2 MIXED HYPERLIPIDEMIA: ICD-10-CM

## 2021-11-08 RX ORDER — ATORVASTATIN CALCIUM 20 MG/1
TABLET, FILM COATED ORAL
Qty: 90 TABLET | Refills: 0 | Status: SHIPPED | OUTPATIENT
Start: 2021-11-08 | End: 2022-02-14

## 2021-11-15 ENCOUNTER — OFFICE VISIT (OUTPATIENT)
Dept: FAMILY MEDICINE CLINIC | Facility: CLINIC | Age: 66
End: 2021-11-15

## 2021-11-15 VITALS
RESPIRATION RATE: 18 BRPM | SYSTOLIC BLOOD PRESSURE: 137 MMHG | WEIGHT: 182.8 LBS | OXYGEN SATURATION: 97 % | BODY MASS INDEX: 32.39 KG/M2 | DIASTOLIC BLOOD PRESSURE: 83 MMHG | HEART RATE: 87 BPM | TEMPERATURE: 98.4 F | HEIGHT: 63 IN

## 2021-11-15 DIAGNOSIS — R06.2 WHEEZING: ICD-10-CM

## 2021-11-15 DIAGNOSIS — R05.9 COUGH: ICD-10-CM

## 2021-11-15 DIAGNOSIS — R06.02 SOB (SHORTNESS OF BREATH): Primary | ICD-10-CM

## 2021-11-15 PROCEDURE — 99214 OFFICE O/P EST MOD 30 MIN: CPT | Performed by: NURSE PRACTITIONER

## 2021-11-15 RX ORDER — MELATONIN
1000 DAILY
COMMUNITY
End: 2022-04-11

## 2021-11-15 RX ORDER — AZITHROMYCIN 250 MG/1
TABLET, FILM COATED ORAL
Qty: 6 TABLET | Refills: 0 | Status: SHIPPED | OUTPATIENT
Start: 2021-11-15 | End: 2021-12-07 | Stop reason: SDUPTHER

## 2021-11-15 RX ORDER — METHYLPREDNISOLONE 4 MG/1
TABLET ORAL
Qty: 21 EACH | Refills: 0 | Status: SHIPPED | OUTPATIENT
Start: 2021-11-15 | End: 2021-12-07 | Stop reason: SDUPTHER

## 2021-11-15 RX ORDER — GUAIFENESIN AND CODEINE PHOSPHATE 100; 10 MG/5ML; MG/5ML
5 SOLUTION ORAL 3 TIMES DAILY PRN
Qty: 60 ML | Refills: 0 | Status: SHIPPED | OUTPATIENT
Start: 2021-11-15 | End: 2021-12-07 | Stop reason: SDUPTHER

## 2021-11-15 NOTE — PROGRESS NOTES
I have already called Jonathan about the chest x-ray and my treatment for her.  Just making note thank you

## 2021-11-15 NOTE — PROGRESS NOTES
"Chief Complaint  Cough (since last week/ patient states she can smell something when she coughs and it is nasty) and Shortness of Breath    Subjective          Jonathan GERALDINE Rodriguez presents to [facility]       58-year-old female patient presents today with a cough that started last week.  She does have restrictive airway disease.  She reports that the cough is mostly nonproductive however on occasion it is productive with a \"foul taste \".  Patient cannot describe color as she looked at any sputum.  Ports that she is wheezing at night and has been using her inhaler as directed.  However, on occasion she gets \"an extra dose or 2 during the day \".  She denies any fever, chills, headaches, smell or taste alterations.  She has no nausea or vomiting.  She does report some shortness of breath that is relieved with her inhaler.  Patient does not have any sick contact exposures that she is aware.     Objective   Vital Signs:   /83 (BP Location: Right arm, Patient Position: Sitting, Cuff Size: Adult)   Pulse 87   Temp 98.4 °F (36.9 °C) (Infrared)   Resp 18   Ht 160 cm (62.99\")   Wt 82.9 kg (182 lb 12.8 oz)   SpO2 97%   BMI 32.39 kg/m²       Physical Exam  Constitutional:       Appearance: Normal appearance. She is well-developed.      Comments: Wearing a face mask     HENT:      Head: Normocephalic and atraumatic.      Right Ear: Tympanic membrane, ear canal and external ear normal.      Left Ear: Tympanic membrane, ear canal and external ear normal.      Nose: Nose normal. No congestion or rhinorrhea.      Mouth/Throat:      Mouth: Mucous membranes are moist.      Pharynx: Oropharynx is clear. No oropharyngeal exudate or posterior oropharyngeal erythema.   Eyes:      Conjunctiva/sclera: Conjunctivae normal.   Cardiovascular:      Rate and Rhythm: Normal rate and regular rhythm.      Pulses: Normal pulses.      Heart sounds: Normal heart sounds.   Pulmonary:      Effort: Pulmonary " effort is normal.      Breath sounds: Examination of the right-lower field reveals rhonchi. Examination of the left-lower field reveals rhonchi. Decreased breath sounds and rhonchi present.   Abdominal:      General: Bowel sounds are normal.      Palpations: Abdomen is soft.   Musculoskeletal:         General: Normal range of motion.      Cervical back: Normal range of motion.   Skin:     General: Skin is warm and dry.      Findings: No rash.   Neurological:      Mental Status: She is alert and oriented to person, place, and time.   Psychiatric:         Behavior: Behavior normal.        Result Review :                 Assessment and Plan    Diagnoses and all orders for this visit:    1. SOB (shortness of breath) (Primary)  -     XR Chest 2 View    2. Cough  -     XR Chest 2 View  -     guaiFENesin-codeine (GUAIFENESIN AC) 100-10 MG/5ML liquid; Take 5 mL by mouth 3 (Three) Times a Day As Needed for Cough.  Dispense: 60 mL; Refill: 0    3. Wheezing  -     XR Chest 2 View    Other orders  -     azithromycin (Zithromax Z-Patrick) 250 MG tablet; Take 2 tablets the first day, then 1 tablet daily for 4 days.  Dispense: 6 tablet; Refill: 0  -     methylPREDNISolone (MEDROL) 4 MG dose pack; Take as directed on package instructions.  Dispense: 21 each; Refill: 0        Follow Up   No follow-ups on file.  Patient was given instructions and counseling regarding her condition or for health maintenance advice. Please see specific information pulled into the AVS if appropriate.

## 2021-12-07 ENCOUNTER — TELEPHONE (OUTPATIENT)
Dept: FAMILY MEDICINE CLINIC | Facility: CLINIC | Age: 66
End: 2021-12-07

## 2021-12-07 DIAGNOSIS — R05.9 COUGH: ICD-10-CM

## 2021-12-07 RX ORDER — AZITHROMYCIN 250 MG/1
TABLET, FILM COATED ORAL
Qty: 6 TABLET | Refills: 0 | Status: SHIPPED | OUTPATIENT
Start: 2021-12-07 | End: 2021-12-12

## 2021-12-07 RX ORDER — METHYLPREDNISOLONE 4 MG/1
TABLET ORAL
Qty: 21 EACH | Refills: 0 | Status: SHIPPED | OUTPATIENT
Start: 2021-12-07 | End: 2021-12-12

## 2021-12-07 RX ORDER — GUAIFENESIN AND CODEINE PHOSPHATE 100; 10 MG/5ML; MG/5ML
5 SOLUTION ORAL 3 TIMES DAILY PRN
Qty: 60 ML | Refills: 0 | Status: SHIPPED | OUTPATIENT
Start: 2021-12-07 | End: 2022-01-03

## 2021-12-07 NOTE — TELEPHONE ENCOUNTER
Please tell Jonathan I have sent in another course of those medications, despite the fact I did not see her for this on the 20th.  The hub sent the message to me.  If this does not get her over it, let us know and we would be happy to see her again.  Thanks

## 2021-12-07 NOTE — TELEPHONE ENCOUNTER
Caller: Jonathan Rodriguez    Relationship: Self    Best call back number:[phone] (H)    Requested Prescriptions:   Requested Prescriptions     Pending Prescriptions Disp Refills   • guaiFENesin-codeine (GUAIFENESIN AC) 100-10 MG/5ML liquid 60 mL 0     Sig: Take 5 mL by mouth 3 (Three) Times a Day As Needed for Cough.        Pharmacy where request should be sent: [address] - [phone]  - [phone] [phone]     Additional details provided by patient:PATIENT WOULD LIKE A  REFILL ON THE COUGH MEDICINE AS WELL AS THE ZPAC AND STEROID THAT WAS PRESCRIBED ON 11/15/21. STATES IT HELPED WITH THE COUGH AND WHEEZING . THAT SHE CAME IN FOR THAT DAY, BUT IT HAS CAME BACK. PLEASE ADVISE. THANK YOU.     Does the patient have less than a 3 day supply:  [x] Yes  [] No    Bo De Guzman Rep   12/07/21 14:59 EST

## 2022-01-03 ENCOUNTER — OFFICE VISIT (OUTPATIENT)
Dept: FAMILY MEDICINE CLINIC | Facility: CLINIC | Age: 67
End: 2022-01-03

## 2022-01-03 VITALS
TEMPERATURE: 97.8 F | BODY MASS INDEX: 31.54 KG/M2 | WEIGHT: 178 LBS | RESPIRATION RATE: 18 BRPM | OXYGEN SATURATION: 98 % | HEIGHT: 63 IN | HEART RATE: 84 BPM | SYSTOLIC BLOOD PRESSURE: 129 MMHG | DIASTOLIC BLOOD PRESSURE: 88 MMHG

## 2022-01-03 DIAGNOSIS — R05.9 COUGH: Primary | ICD-10-CM

## 2022-01-03 DIAGNOSIS — R51.9 ACUTE NONINTRACTABLE HEADACHE, UNSPECIFIED HEADACHE TYPE: ICD-10-CM

## 2022-01-03 DIAGNOSIS — R06.02 SOB (SHORTNESS OF BREATH): ICD-10-CM

## 2022-01-03 PROCEDURE — 99214 OFFICE O/P EST MOD 30 MIN: CPT | Performed by: NURSE PRACTITIONER

## 2022-01-03 RX ORDER — BENZONATATE 100 MG/1
100-200 CAPSULE ORAL 3 TIMES DAILY PRN
Qty: 21 CAPSULE | Refills: 0 | Status: SHIPPED | OUTPATIENT
Start: 2022-01-03 | End: 2022-04-11

## 2022-01-03 NOTE — ASSESSMENT & PLAN NOTE
Advised patient to continue using albuterol inhaler as needed every 4 hours for shortness of breath.  Instructed to go to the ER if inhalers not working, shortness of breath increases, or has other respiratory concerns.

## 2022-01-03 NOTE — PROGRESS NOTES
"Chief Complaint  Cough (since friday ) and Wheezing    Subjective          Jonathan Rodriguez presents to [facility]      Jonathan is a 66 year old female patient who presents to the office today with c/o cough and wheezing. She is SOB intermittently with cough and does have a hx of reactive airway disease. Cough is nonproductive however \"once in a while clear comes up\". She is using her albuterol inhaler every four hours and reports this helps with her wheezing. She has a mild headache off and on as well and it is alleviated with OTC \"aspirin\". She did not go to [facility] yesterday as \"there is a lot of coughing going around there\". Denies fever, chills, body aches, smell and taste intact. Denies N/V/D.          Objective     Vital Signs:   /88 (BP Location: Right arm, Patient Position: Sitting, Cuff Size: Adult)   Pulse 84   Temp 97.8 °F (36.6 °C) (Infrared)   Resp 18   Ht 160 cm (62.99\")   Wt 80.7 kg (178 lb)   SpO2 98%   BMI 31.54 kg/m²           Physical Exam  Vitals reviewed.   Constitutional:       Appearance: She is well-developed. She is obese.      Comments: Wearing a face mask     HENT:      Head: Normocephalic and atraumatic.      Right Ear: Tympanic membrane, ear canal and external ear normal.      Left Ear: Tympanic membrane, ear canal and external ear normal.      Nose: Nose normal. No congestion.   Eyes:      Conjunctiva/sclera: Conjunctivae normal.   Cardiovascular:      Rate and Rhythm: Normal rate and regular rhythm.      Pulses: Normal pulses.      Heart sounds: Normal heart sounds.   Pulmonary:      Effort: Pulmonary effort is normal. No respiratory distress.      Breath sounds: Normal breath sounds and air entry. No stridor. No wheezing, rhonchi or rales.   Chest:      Chest wall: No tenderness.   Musculoskeletal:         General: Normal range of motion.      Cervical back: Normal range of motion and neck supple.   Lymphadenopathy:      Head:      Right " side of head: No submental, submandibular, tonsillar, preauricular, posterior auricular or occipital adenopathy.      Left side of head: No submental, submandibular, tonsillar, preauricular, posterior auricular or occipital adenopathy.      Cervical: No cervical adenopathy.      Right cervical: No superficial, deep or posterior cervical adenopathy.     Left cervical: No superficial, deep or posterior cervical adenopathy.   Skin:     General: Skin is warm and dry.      Findings: No rash.   Neurological:      Mental Status: She is alert and oriented to person, place, and time.   Psychiatric:         Behavior: Behavior normal.            Result Review :                                   Assessment and Plan      Diagnoses and all orders for this visit:    1. Cough (Primary)  Assessment & Plan:  Prescribed Tessalon for cough.    Orders:  -     Cancel: COVID-19,[facility] ROUTINE, NP/OP SWAB IN TRANSPORT MEDIA OR ESWAB 72 HR TAT - Swab, Nasopharynx; Future  -     COVID-19,[facility] ROUTINE, NP/OP SWAB IN TRANSPORT MEDIA OR ESWAB 72 HR TAT - Swab, Nasopharynx    2. SOB (shortness of breath)  Assessment & Plan:  Advised patient to continue using albuterol inhaler as needed every 4 hours for shortness of breath.  Instructed to go to the ER if inhalers not working, shortness of breath increases, or has other respiratory concerns.      3. Acute nonintractable headache, unspecified headache type  Assessment & Plan:  Patient encouraged to continue taking OTC pain reliever and adequate fluid intake        Other orders  -     benzonatate (Tessalon Perles) 100 MG capsule; Take 1-2 capsules by mouth 3 (Three) Times a Day As Needed for Cough.  Dispense: 21 capsule; Refill: 0          Follow Up       Return for Next scheduled follow up, with Dr. Kerri Almanza.      Patient was given instructions and counseling regarding her condition or for health maintenance advice. Please see specific information pulled into the AVS if appropriate.      Yesi Childers, [id number] EST  This note has been electronically signed

## 2022-01-04 LAB
LABCORP SARS-COV-2, NAA 2 DAY TAT: NORMAL
SARS-COV-2 RNA RESP QL NAA+PROBE: NOT DETECTED

## 2022-01-18 ENCOUNTER — OFFICE VISIT (OUTPATIENT)
Dept: FAMILY MEDICINE CLINIC | Facility: CLINIC | Age: 67
End: 2022-01-18

## 2022-01-18 VITALS
DIASTOLIC BLOOD PRESSURE: 56 MMHG | SYSTOLIC BLOOD PRESSURE: 108 MMHG | HEART RATE: 61 BPM | OXYGEN SATURATION: 95 % | TEMPERATURE: 97.9 F

## 2022-01-18 DIAGNOSIS — J45.40 MODERATE PERSISTENT REACTIVE AIRWAY DISEASE WITHOUT COMPLICATION: ICD-10-CM

## 2022-01-18 DIAGNOSIS — R05.9 COUGH: Primary | ICD-10-CM

## 2022-01-18 DIAGNOSIS — J18.9 PNEUMONIA OF RIGHT LOWER LOBE DUE TO INFECTIOUS ORGANISM: ICD-10-CM

## 2022-01-18 DIAGNOSIS — R06.02 SHORTNESS OF BREATH: ICD-10-CM

## 2022-01-18 PROCEDURE — 99214 OFFICE O/P EST MOD 30 MIN: CPT | Performed by: FAMILY MEDICINE

## 2022-01-18 RX ORDER — LEVOFLOXACIN 500 MG/1
500 TABLET, FILM COATED ORAL DAILY
Qty: 7 TABLET | Refills: 0 | Status: SHIPPED | OUTPATIENT
Start: 2022-01-18 | End: 2022-02-18

## 2022-01-18 RX ORDER — ALBUTEROL SULFATE 90 UG/1
2 AEROSOL, METERED RESPIRATORY (INHALATION) EVERY 4 HOURS PRN
Qty: 18 G | Refills: 5 | Status: SHIPPED | OUTPATIENT
Start: 2022-01-18

## 2022-01-18 NOTE — PROGRESS NOTES
Lenny Rodriguez is a 66 y.o. female.   Chief Complaint   Patient presents with   • Cough       History of Present Illness   Presents to the office today with a complaint of cough.  Reports today that her cough is worse.  We talked about a cough that she had almost 2 years ago and I felt that she had a reactive airway disease.  She had pulmonary function tests in August 2020 because of this.  She had a positive bronchodilator response.  FEV1 to FVC ratio was normal at 93, but FEV1 was only 66% of predicted and FVC was 54% of predicted.    She has been here in November and January and saw Yesi.  Complaint was shortness of breath.  Initially she was treated with Z-Patrick and prednisone.  Second time she was told to use albuterol.  She had a chest x-ray in November and again today in January.  It was normal still.  Tested for COVID in January was negative.  Her breathing has become worse over the last week.  If she is speaking a lot she will have to stop and catch her breath.  It is worse at night.  She wheezes at night.  Cough is occasionally productive of phlegm.  Sometimes brown and yellow, sometimes clear.  She has not run any fevers, but has had some chills.  She is vaccinated against COVID-19 and tested negative a few weeks ago.    Patient Active Problem List    Diagnosis Date Noted   • Cough 01/03/2022   • Acute nonintractable headache 01/03/2022   • SOB (shortness of breath) 01/03/2022   • Chronic GERD 10/11/2021   • Reactive airway disease without complication 10/05/2020   • Arthritis of right knee 10/04/2020   • LVH (left ventricular hypertrophy) 06/01/2020   • Acquired hypothyroidism 11/25/2019   • Otosclerosis of both ears 11/25/2019     Note Last Updated: 11/25/2019     Replaced bones in ears with metal pistons - NO MRI's     • MRI contraindicated due to metal implant 11/25/2019     Note Last Updated: 11/25/2019     Due to metal implants in inner ear     • Mixed hyperlipidemia 08/15/2019   •  Allergic rhinitis 12/26/2018   • Herpes zoster 12/17/2018   • Loss of height 06/25/2018   • Encounter for general adult medical examination without abnormal findings 06/21/2017   • Visit for screening mammogram 06/21/2017   • Obesity with body mass index 30 or greater 06/21/2016   • Hemorrhoids, external 06/11/2012   • Obesity 06/11/2012   • Thyromegaly 06/11/2012   • Vaginitis, atrophic 06/11/2012   • Decreased white blood cell count 06/04/2012   • Essential hypertension 06/04/2012           Past Surgical History:   Procedure Laterality Date   • COLONOSCOPY      NEG = 2014, rech 2024   • INNER EAR SURGERY Bilateral    • THYROIDECTOMY, PARTIAL      Left thyroid mass= Benign   • TONSILLECTOMY     • TUBAL ABDOMINAL LIGATION       Current Outpatient Medications on File Prior to Visit   Medication Sig   • amLODIPine (NORVASC) 5 MG tablet Take 1 tablet by mouth once daily   • aspirin 325 MG tablet Take 1 tablet by mouth Daily.   • atorvastatin (LIPITOR) 20 MG tablet TAKE 1 TABLET BY MOUTH ONCE DAILY AT NIGHT   • benzonatate (Tessalon Perles) 100 MG capsule Take 1-2 capsules by mouth 3 (Three) Times a Day As Needed for Cough.   • cholecalciferol (VITAMIN D3) 25 MCG (1000 UT) tablet Take 1,000 Units by mouth Daily.   • Euthyrox 112 MCG tablet Take 1 tablet by mouth once daily   • triamterene-hydrochlorothiazide (MAXZIDE-25) 37.5-25 MG per tablet Take 1 tablet by mouth once daily   • [DISCONTINUED] albuterol sulfate  (90 Base) MCG/ACT inhaler Inhale 2 puffs Every 4 (Four) Hours As Needed for Shortness of Air.     No current facility-administered medications on file prior to visit.     No Known Allergies  Social History     Socioeconomic History   • Marital status:    Tobacco Use   • Smoking status: Never Smoker   • Smokeless tobacco: Never Used   Vaping Use   • Vaping Use: Never used   Substance and Sexual Activity   • Alcohol use: No   • Drug use: No   • Sexual activity: Yes     Partners: Male     Birth  control/protection: None     Family History   Problem Relation Age of Onset   • COPD Mother    • Heart disease Father    • Heart disease Brother    • Lymphoma Brother    • Colon cancer Paternal Grandmother        Review of Systems    Objective   /56 (BP Location: Left arm, Patient Position: Sitting, Cuff Size: Adult)   Pulse 61   Temp 97.9 °F (36.6 °C) (Oral)   LMP  (LMP Unknown)   SpO2 95%   Physical Exam  Constitutional:       Appearance: Normal appearance. She is well-developed. She is not ill-appearing.      Comments: Wearing a face mask     HENT:      Head: Normocephalic and atraumatic.   Eyes:      Conjunctiva/sclera: Conjunctivae normal.   Cardiovascular:      Rate and Rhythm: Normal rate.   Pulmonary:      Effort: No accessory muscle usage, prolonged expiration or respiratory distress.      Breath sounds: Examination of the right-lower field reveals rales. Decreased breath sounds and rales present. No wheezing or rhonchi.      Comments: She is not in respiratory distress, but stops several times during a long sentence to take a noticeably deeper breath.  A deep breath does not provoke a paroxysmal of cough  Musculoskeletal:         General: Normal range of motion.      Cervical back: Normal range of motion.   Skin:     General: Skin is warm and dry.      Coloration: Skin is not ashen.      Findings: No rash.      Comments: She is not diaphoretic   Neurological:      Mental Status: She is alert and oriented to person, place, and time.   Psychiatric:         Behavior: Behavior normal.         Assessment/Plan   Diagnoses and all orders for this visit:    1. Cough (Primary)  -     XR Chest PA & Lateral (In Office)  -     CT Chest With Contrast; Future  -     COVID-19,[facility] ROUTINE, NP/OP SWAB IN TRANSPORT MEDIA OR ESWAB 72 HR TAT - Swab, Nasopharynx    2. Moderate persistent reactive airway disease without complication  -     albuterol sulfate  (90 Base) MCG/ACT inhaler; Inhale 2 puffs Every 4  (Four) Hours As Needed for Shortness of Air.  Dispense: 18 g; Refill: 5  -     COVID-19,[facility] ROUTINE, NP/OP SWAB IN TRANSPORT MEDIA OR ESWAB 72 HR TAT - Swab, Nasopharynx    3. Shortness of breath  -     CT Chest With Contrast; Future  -     COVID-19,[facility] ROUTINE, NP/OP SWAB IN TRANSPORT MEDIA OR ESWAB 72 HR TAT - Swab, Nasopharynx    4. Pneumonia of right lower lobe due to infectious organism  -     levoFLOXacin (Levaquin) 500 MG tablet; Take 1 tablet by mouth Daily.  Dispense: 7 tablet; Refill: 0  -     HYDROcod Polst-CPM Polst ER (Tussionex Pennkinetic ER) 10-8 MG/5ML ER suspension; Take 5 mL by mouth At Night As Needed for Cough.  Dispense: 60 mL; Refill: 0  -     COVID-19,[facility] ROUTINE, NP/OP SWAB IN TRANSPORT MEDIA OR ESWAB 72 HR TAT - Swab, Nasopharynx    Clinically, I think she has pneumonia.  She has crackles on exam at the right lower base.  Per our previous evaluations she appeared to have some type of reactive airway disease that improved with the bronchodilator.  Her FEV1 to FVC ratio however was normal.  When she uses her albuterol inhaler now, she tells me she is able to breathe much better for an hour or 2.  She does not have any signs of decompensated heart failure such as swelling in her feet or other signs of fluid retention.  Chest x-ray read today by the radiologist as normal.  We repeated a COVID test today.  Based on her exam I think we should treat her as if she has right lower lobe community-acquired pneumonia.  We will treat with Levaquin daily for a week, Tussionex cough syrup at night, Delsym during the day for her cough.  I gave her a sample of Anoro.  1 puff once daily for 1 week.  She can also use her albuterol up to every 4 hours-2 puffs at a time.  Let me know in a few days if she is improving at all.  I think we should press on with her work-up and get a CT scan of her chest.  We will order this with contrast to get the best definition possible of fibrosis.  This is been  going on now in 1 form or another since November.  Doubt PE.  She has no swelling in her feet or legs.  I will follow-up with her when the results of the CAT scan is back and we will make an office follow-up plans based on clinical progress and test results.        Call with any problems or concerns before next visit       No follow-ups on file.      Much of this report is an electronic transcription of spoken language to printed text using Dragon dictation software.  As such, the subtleties and finesse of spoken language may permit erroneous, or at times, nonsensical words or phrases to be inadvertently transcribed; thus changes may be made at a later date to rectify these errors.     Kerri Almanza MD1/18/202217:39 EST  This note has been electronically signed

## 2022-01-19 ENCOUNTER — TELEPHONE (OUTPATIENT)
Dept: FAMILY MEDICINE CLINIC | Facility: CLINIC | Age: 67
End: 2022-01-19

## 2022-01-19 DIAGNOSIS — J18.9 PNEUMONIA OF RIGHT LOWER LOBE DUE TO INFECTIOUS ORGANISM: ICD-10-CM

## 2022-01-19 NOTE — TELEPHONE ENCOUNTER
Pharmacy Name: [facility] [address] [place] [address] [id number] - [phone] [facility] [phone]      Pharmacy representative name: ALISON    Pharmacy representative phone number: [phone]    What medication are you calling in regards to: HYDROcod Polst-CPM Polst ER      What question does the pharmacy have: PHARMACY HAS ADDITIONAL QUESTIONS FOR THIS MEDICATION.  IS THIS FOR A CHRONIC COUGH.    Who is the provider that prescribed the medication: DR. ANEESH MARIN    Additional notes:

## 2022-01-19 NOTE — TELEPHONE ENCOUNTER
I spoke with patient, gave her the phone number to [facility] scheduling to schedule her CT scan. I also advised her if that pharmacy does not have this medicine, she needs to call around and see who has it and let us know and we can send it  there.

## 2022-01-19 NOTE — TELEPHONE ENCOUNTER
Please tell her that I have sent a prescription for the Tussionex to the [facility] in [place].  Please look into and update her on the CAT scan of her chest.  I ordered it yesterday.  Thanks

## 2022-01-19 NOTE — TELEPHONE ENCOUNTER
I spoke with pharmacist. He needed to know a little more so it will give her the prescribed dose for 12 days and not 7 days.

## 2022-01-19 NOTE — TELEPHONE ENCOUNTER
PATIENT CALLED AND STATES THE MEDICATION    HYDROcod Polst-CPM Polst ER (Tussionex Pennkinetic ER) 10-8 MG/5ML ER suspension  THAT WAS SENT YESTERDAY. [facility] DOES NOT HAVE IT IN STOCK.    SHE IS WANTING TO KNOW IF IT COULD BE SENT TO [facility] IN [place] IF THEY HAVE IT OR A [facility] IN [place].    PLEASE CALL AND ADVISE [phone]    SHE IS WAITING ON CAT SCAN OF LUNGS ORDER AT THE HOSPITAL AS WELL.

## 2022-01-20 ENCOUNTER — TELEPHONE (OUTPATIENT)
Dept: FAMILY MEDICINE CLINIC | Facility: CLINIC | Age: 67
End: 2022-01-20

## 2022-01-20 LAB
LABCORP SARS-COV-2, NAA 2 DAY TAT: NORMAL
SARS-COV-2 RNA RESP QL NAA+PROBE: NOT DETECTED

## 2022-01-20 NOTE — TELEPHONE ENCOUNTER
Caller: Rodriguez Jonathan S    Relationship: Self    Best call back number: [phone]    Caller requesting test results: PATIENT    What test was performed: COVID     When was the test performed: 1/18/22    Where was the test performed: IN OFFICE     Additional notes: PATIENT STATES THE INHALER IS HELPING, AND SHE HAS A CT SCAN TOMORROW.

## 2022-01-20 NOTE — TELEPHONE ENCOUNTER
Notified patient her covid test was negative but she wanted to let dr thomas know inhaler was helping.

## 2022-01-21 ENCOUNTER — HOSPITAL ENCOUNTER (OUTPATIENT)
Dept: CT IMAGING | Facility: HOSPITAL | Age: 67
Discharge: HOME | End: 2022-01-21
Admitting: FAMILY MEDICINE

## 2022-01-21 DIAGNOSIS — R05.9 COUGH: ICD-10-CM

## 2022-01-21 DIAGNOSIS — J45.40 MODERATE PERSISTENT REACTIVE AIRWAY DISEASE WITHOUT COMPLICATION: Primary | ICD-10-CM

## 2022-01-21 DIAGNOSIS — J42 CHRONIC BRONCHITIS, UNSPECIFIED CHRONIC BRONCHITIS TYPE: ICD-10-CM

## 2022-01-21 DIAGNOSIS — R06.02 SHORTNESS OF BREATH: ICD-10-CM

## 2022-01-21 LAB — CREAT BLDA-MCNC: 0.9 MG/DL (ref 0.6–1.3)

## 2022-01-21 PROCEDURE — 82565 ASSAY OF CREATININE: CPT

## 2022-01-21 PROCEDURE — 0 IOPAMIDOL PER 1 ML: Performed by: FAMILY MEDICINE

## 2022-01-21 PROCEDURE — 71260 CT THORAX DX C+: CPT

## 2022-01-21 RX ADMIN — IOPAMIDOL 100 ML: 755 INJECTION, SOLUTION INTRAVENOUS at 11:00

## 2022-01-21 NOTE — TELEPHONE ENCOUNTER
Glad to hear the inhaler is helping.  I will follow-up with her after the results of the CAT scan is back.

## 2022-01-24 ENCOUNTER — TELEPHONE (OUTPATIENT)
Dept: FAMILY MEDICINE CLINIC | Facility: CLINIC | Age: 67
End: 2022-01-24

## 2022-01-24 NOTE — TELEPHONE ENCOUNTER
Caller: Jonathan Rodriguez    Relationship: Self    Best call back number:  [phone]      Who are you requesting to speak with (clinical staff, provider,  specific staff member): NURSE    What was the call regarding: PATIENT IS STILL COUGHING AND HAS SOB SHE STATES THAT THE ANORRO HAD HELPED BUT SHE IS NOW OUT AND HAS NOT HEARD BACK FROM THE PULMONOLOGIST. PATIENT WOULD LIKE TO KNOW IF THE OFFICE HAS MORE SAMPLES OR IF SHE NEEDS IT CALLED IN TO PHARMACY BELOW.  PLEASE ADVISE.    Do you require a callback: YES    [facility] [address] [facility], [place] - [address] [facility] - [phone]  - [phone]   [phone]

## 2022-01-24 NOTE — TELEPHONE ENCOUNTER
I have sent out a sample of a medication called Trelegy.  It is very similar to the Anoro.  It is still 1 puff once daily.  This will give her by another 2 weeks while we await the consult with pulmonology.  If she gets close to running out of this, let me know and I will see what we have to give her at that point.  These medications tend to be very expensive and I want to be sure that it will help her before I send in a prescription.  Please log the sample and let her know she can pick it up.  Thanks

## 2022-01-25 ENCOUNTER — TELEPHONE (OUTPATIENT)
Dept: FAMILY MEDICINE CLINIC | Facility: CLINIC | Age: 67
End: 2022-01-25

## 2022-01-25 DIAGNOSIS — J45.40 MODERATE PERSISTENT REACTIVE AIRWAY DISEASE WITHOUT COMPLICATION: Primary | ICD-10-CM

## 2022-01-25 DIAGNOSIS — J42 CHRONIC BRONCHITIS, UNSPECIFIED CHRONIC BRONCHITIS TYPE: ICD-10-CM

## 2022-01-25 NOTE — TELEPHONE ENCOUNTER
Caller: Jonathan Rodriguez    Relationship: Self    Best call back number: [phone]    What orders are you requesting (i.e. lab or imaging): DR ILYA WINSLOW    In what timeframe would the patient need to come in: AS SOON AS POSSIBLE    Where will you receive your lab/imaging services: AT THE PULMONOLOGIST OFFICE IN [place]    Additional notes: PATIENT HAS NOT HEARD FROM DR WILDER AND WOULD PREFER TO GO TO DR DRAW IN [place].     PATIENT IS WANTING TO GET IN ANY OF THE OFFICES THAT DR CARABALLO IS IN. PATIENT WOULD LIKE THE SOONEST AVAILABLE APPOINTMENT.

## 2022-02-12 DIAGNOSIS — E78.2 MIXED HYPERLIPIDEMIA: ICD-10-CM

## 2022-02-14 RX ORDER — ATORVASTATIN CALCIUM 20 MG/1
TABLET, FILM COATED ORAL
Qty: 90 TABLET | Refills: 0 | Status: SHIPPED | OUTPATIENT
Start: 2022-02-14 | End: 2022-05-05

## 2022-02-18 ENCOUNTER — OFFICE VISIT (OUTPATIENT)
Dept: FAMILY MEDICINE CLINIC | Facility: CLINIC | Age: 67
End: 2022-02-18

## 2022-02-18 VITALS
DIASTOLIC BLOOD PRESSURE: 74 MMHG | OXYGEN SATURATION: 97 % | SYSTOLIC BLOOD PRESSURE: 132 MMHG | WEIGHT: 177 LBS | TEMPERATURE: 98.2 F | BODY MASS INDEX: 31.36 KG/M2 | HEART RATE: 82 BPM | HEIGHT: 63 IN

## 2022-02-18 DIAGNOSIS — J20.9 CHRONIC BRONCHITIS WITH ACUTE EXACERBATION: Primary | ICD-10-CM

## 2022-02-18 DIAGNOSIS — J42 CHRONIC BRONCHITIS WITH ACUTE EXACERBATION: Primary | ICD-10-CM

## 2022-02-18 DIAGNOSIS — R06.02 SOB (SHORTNESS OF BREATH): ICD-10-CM

## 2022-02-18 PROCEDURE — 99214 OFFICE O/P EST MOD 30 MIN: CPT | Performed by: FAMILY MEDICINE

## 2022-02-18 RX ORDER — PREDNISONE 20 MG/1
40 TABLET ORAL DAILY
Qty: 6 TABLET | Refills: 0 | Status: SHIPPED | OUTPATIENT
Start: 2022-02-18 | End: 2022-02-21

## 2022-02-18 RX ORDER — SULFAMETHOXAZOLE AND TRIMETHOPRIM 800; 160 MG/1; MG/1
1 TABLET ORAL 2 TIMES DAILY
Qty: 14 TABLET | Refills: 0 | Status: SHIPPED | OUTPATIENT
Start: 2022-02-18 | End: 2022-02-25

## 2022-02-18 NOTE — PROGRESS NOTES
"Subjective   Jonathan Rodriguez is a 66 y.o. female.   Chief Complaint   Patient presents with   • Wheezing       History of Present Illness   Presents to the office today coughing, wheezing.  We recently diagnosed Jonathan with chronic bronchitis.  She saw Dr. Graves yesterday.  He gave her a prescription for Trelegy.  Its not covered well.  She is going back to talk to the pharmacy and her insurance company today about it.  She was not able to start it.  She is continuing to have cough, shortness of breath, wheezing.  Tells me that she is having a lot of \"whistling\" at night.  She has started coughing up brown mucus a few days ago.  Albuterol does still continue to help her breathe better and cuts down on the wheezing it only last for about 3 hours.  She is not sick.  She triggered the Covid protocols and she was brought in through the back and swabbed.    Patient Active Problem List    Diagnosis Date Noted   • Cough 01/03/2022   • Acute nonintractable headache 01/03/2022   • SOB (shortness of breath) 01/03/2022   • Chronic GERD 10/11/2021   • Reactive airway disease without complication 10/05/2020   • Arthritis of right knee 10/04/2020   • LVH (left ventricular hypertrophy) 06/01/2020   • Acquired hypothyroidism 11/25/2019   • Otosclerosis of both ears 11/25/2019     Note Last Updated: 11/25/2019     Replaced bones in ears with metal pistons - NO MRI's     • MRI contraindicated due to metal implant 11/25/2019     Note Last Updated: 11/25/2019     Due to metal implants in inner ear     • Mixed hyperlipidemia 08/15/2019   • Allergic rhinitis 12/26/2018   • Herpes zoster 12/17/2018   • Loss of height 06/25/2018   • Encounter for general adult medical examination without abnormal findings 06/21/2017   • Visit for screening mammogram 06/21/2017   • Obesity with body mass index 30 or greater 06/21/2016   • Hemorrhoids, external 06/11/2012   • Obesity 06/11/2012   • Thyromegaly 06/11/2012   • Vaginitis, atrophic 06/11/2012   • " Decreased white blood cell count 06/04/2012   • Essential hypertension 06/04/2012           Past Surgical History:   Procedure Laterality Date   • COLONOSCOPY      NEG = 2014, rech 2024   • INNER EAR SURGERY Bilateral    • THYROIDECTOMY, PARTIAL      Left thyroid mass= Benign   • TONSILLECTOMY     • TUBAL ABDOMINAL LIGATION       Current Outpatient Medications on File Prior to Visit   Medication Sig   • albuterol sulfate  (90 Base) MCG/ACT inhaler Inhale 2 puffs Every 4 (Four) Hours As Needed for Shortness of Air.   • amLODIPine (NORVASC) 5 MG tablet Take 1 tablet by mouth once daily   • aspirin 325 MG tablet Take 1 tablet by mouth Daily.   • atorvastatin (LIPITOR) 20 MG tablet TAKE 1 TABLET BY MOUTH ONCE DAILY AT NIGHT   • benzonatate (Tessalon Perles) 100 MG capsule Take 1-2 capsules by mouth 3 (Three) Times a Day As Needed for Cough.   • cholecalciferol (VITAMIN D3) 25 MCG (1000 UT) tablet Take 1,000 Units by mouth Daily.   • Euthyrox 112 MCG tablet Take 1 tablet by mouth once daily   • triamterene-hydrochlorothiazide (MAXZIDE-25) 37.5-25 MG per tablet Take 1 tablet by mouth once daily   • [DISCONTINUED] HYDROcod Polst-CPM Polst ER (Tussionex Pennkinetic ER) 10-8 MG/5ML ER suspension Take 5 mL by mouth At Night As Needed for Cough.   • [DISCONTINUED] levoFLOXacin (Levaquin) 500 MG tablet Take 1 tablet by mouth Daily.     No current facility-administered medications on file prior to visit.     No Known Allergies  Social History     Socioeconomic History   • Marital status:    Tobacco Use   • Smoking status: Never Smoker   • Smokeless tobacco: Never Used   Vaping Use   • Vaping Use: Never used   Substance and Sexual Activity   • Alcohol use: No   • Drug use: No   • Sexual activity: Yes     Partners: Male     Birth control/protection: None     Family History   Problem Relation Age of Onset   • COPD Mother    • Heart disease Father    • Heart disease Brother    • Lymphoma Brother    • Colon cancer  "Paternal Grandmother        Review of Systems    Objective   /74 (BP Location: Left arm, Patient Position: Sitting, Cuff Size: Adult)   Pulse 82   Temp 98.2 °F (36.8 °C) (Oral)   Ht 160 cm (62.99\")   Wt 80.3 kg (177 lb)   LMP  (LMP Unknown)   SpO2 97%   BMI 31.36 kg/m²   Physical Exam  Constitutional:       General: She is not in acute distress.     Appearance: She is well-developed.      Comments: Wearing a face mask     HENT:      Head: Normocephalic and atraumatic.   Eyes:      Conjunctiva/sclera: Conjunctivae normal.   Cardiovascular:      Rate and Rhythm: Normal rate and regular rhythm.      Heart sounds: Normal heart sounds. No murmur heard.      Pulmonary:      Effort: Pulmonary effort is normal. No respiratory distress.      Breath sounds: Wheezing (Scattered, worse with forced expiration.) and rhonchi (Loose, scattered.) present.   Musculoskeletal:         General: Normal range of motion.      Cervical back: Normal range of motion.   Skin:     General: Skin is warm and dry.      Coloration: Skin is not pale.      Findings: No rash.      Comments: No cyanosis   Neurological:      Mental Status: She is alert and oriented to person, place, and time.   Psychiatric:         Behavior: Behavior normal.           Hospital Outpatient Visit on 01/21/2022   Component Date Value Ref Range Status   • Creatinine 01/21/2022 0.90  0.60 - 1.30 mg/dL Final    Serial Number: [id number]:  [id number]   • GFR MDRD Non  01/21/2022 62* 0 - 60 mL/min/1.73 sq.M Final   Office Visit on 01/18/2022   Component Date Value Ref Range Status   • SARS-CoV-2, JORGE 01/18/2022 Not Detected  Not Detected Final    Comment: This nucleic acid amplification test was developed and its performance  characteristics determined by [facility]. Nucleic acid  amplification tests include RT-PCR and TMA. This test has not been  FDA cleared or approved. This test has been authorized by FDA under  an Emergency Use " Authorization (EUA). This test is only authorized  for the duration of time the declaration that circumstances exist  justifying the authorization of the emergency use of in vitro  diagnostic tests for detection of SARS-CoV-2 virus and/or diagnosis  of COVID-19 infection under section 564(b)(1) of the Act, 21 U.S.C.  360bbb-3(b) (1), unless the authorization is terminated or revoked  sooner.  When diagnostic testing is negative, the possibility of a false  negative result should be considered in the context of a patient's  recent exposures and the presence of clinical signs and symptoms  consistent with COVID-19. An individual without symptoms of COVID-19  and who is not shedding SARS-CoV-2 virus wo                           uld expect to have a  negative (not detected) result in this assay.     • [facility] SARS-COV-2, JORGE 2 DAY TAT 01/18/2022 Performed   Final   Office Visit on 01/03/2022   Component Date Value Ref Range Status   • SARS-CoV-2, JORGE 01/03/2022 Not Detected  Not Detected Final    Comment: This nucleic acid amplification test was developed and its performance  characteristics determined by [facility]. Nucleic acid  amplification tests include RT-PCR and TMA. This test has not been  FDA cleared or approved. This test has been authorized by FDA under  an Emergency Use Authorization (EUA). This test is only authorized  for the duration of time the declaration that circumstances exist  justifying the authorization of the emergency use of in vitro  diagnostic tests for detection of SARS-CoV-2 virus and/or diagnosis  of COVID-19 infection under section 564(b)(1) of the Act, 21 U.S.C.  360bbb-3(b) (1), unless the authorization is terminated or revoked  sooner.  When diagnostic testing is negative, the possibility of a false  negative result should be considered in the context of a patient's  recent exposures and the presence of clinical signs and symptoms  consistent with COVID-19. An individual  without symptoms of COVID-19  and who is not shedding SARS-CoV-2 virus wo                           uld expect to have a  negative (not detected) result in this assay.     • [facility] SARS-COV-2, JORGE 2 DAY TAT 01/03/2022 Performed   Final         Assessment/Plan   Diagnoses and all orders for this visit:    1. Chronic bronchitis with acute exacerbation (HCC) (Primary)  -     sulfamethoxazole-trimethoprim (Bactrim DS) 800-160 MG per tablet; Take 1 tablet by mouth 2 (Two) Times a Day for 7 days.  Dispense: 14 tablet; Refill: 0  -     predniSONE (DELTASONE) 20 MG tablet; Take 2 tablets by mouth Daily for 3 days. x3 days  Dispense: 6 tablet; Refill: 0    2. SOB (shortness of breath)  -     sulfamethoxazole-trimethoprim (Bactrim DS) 800-160 MG per tablet; Take 1 tablet by mouth 2 (Two) Times a Day for 7 days.  Dispense: 14 tablet; Refill: 0  -     COVID-19,[facility] ROUTINE, NP/OP SWAB IN TRANSPORT MEDIA OR ESWAB 72 HR TAT - Swab, Nasopharynx    She appears to have an exacerbation of her chronic bronchitis.  We will treat her with Bactrim DS and a very short prednisone burst.  I gave her a sample of Trelegy.  She will do 1 inhalation daily.  Hopefully this will buy some time to get things straightened out with her insurance company.  She asks why this keeps happening to her.  I explained to her that chronic bronchitis is a subcategory of COPD and this is a chronic lung disease and she will continue to have symptoms from it until we get it under control with daily suppressive treatment.  She mentions she does not want to take medicine every day, and I very clearly explained to her that the only way to manage the symptoms is to use a daily use preventive medication.  If she is not able to get the Trelegy for 1 reason or another, let me know and I will do my best to work with her to try to get a substitute or something.  Keep follow-up as planned in April for management of her other medical problems including hypertension, LVH,  HLD, hypothyroidism.  Keep follow-up with Dr. Graevs per his recommendations.  I will follow-up with her when the results of her Covid swab is back.        Call with any problems or concerns before next visit       Return if symptoms worsen or fail to improve.      Much of this report is an electronic transcription of spoken language to printed text using Dragon dictation software.  As such, the subtleties and finesse of spoken language may permit erroneous, or at times, nonsensical words or phrases to be inadvertently transcribed; thus changes may be made at a later date to rectify these errors.     Kerri Almanza MD2/18/202210:21 EST  This note has been electronically signed

## 2022-02-19 LAB
LABCORP SARS-COV-2, NAA 2 DAY TAT: NORMAL
SARS-COV-2 RNA RESP QL NAA+PROBE: NOT DETECTED

## 2022-04-11 ENCOUNTER — OFFICE VISIT (OUTPATIENT)
Dept: FAMILY MEDICINE CLINIC | Facility: CLINIC | Age: 67
End: 2022-04-11

## 2022-04-11 VITALS
OXYGEN SATURATION: 97 % | RESPIRATION RATE: 18 BRPM | SYSTOLIC BLOOD PRESSURE: 130 MMHG | HEIGHT: 63 IN | TEMPERATURE: 98 F | HEART RATE: 73 BPM | DIASTOLIC BLOOD PRESSURE: 76 MMHG | BODY MASS INDEX: 30.83 KG/M2 | WEIGHT: 174 LBS

## 2022-04-11 DIAGNOSIS — Z11.59 NEED FOR HEPATITIS C SCREENING TEST: ICD-10-CM

## 2022-04-11 DIAGNOSIS — I51.7 LVH (LEFT VENTRICULAR HYPERTROPHY): ICD-10-CM

## 2022-04-11 DIAGNOSIS — Z12.31 ENCOUNTER FOR SCREENING MAMMOGRAM FOR MALIGNANT NEOPLASM OF BREAST: ICD-10-CM

## 2022-04-11 DIAGNOSIS — E78.2 MIXED HYPERLIPIDEMIA: ICD-10-CM

## 2022-04-11 DIAGNOSIS — E03.9 ACQUIRED HYPOTHYROIDISM: ICD-10-CM

## 2022-04-11 DIAGNOSIS — J42 CHRONIC BRONCHITIS, UNSPECIFIED CHRONIC BRONCHITIS TYPE: Primary | ICD-10-CM

## 2022-04-11 DIAGNOSIS — I10 ESSENTIAL HYPERTENSION: ICD-10-CM

## 2022-04-11 DIAGNOSIS — Z78.0 POSTMENOPAUSAL: ICD-10-CM

## 2022-04-11 PROCEDURE — 99214 OFFICE O/P EST MOD 30 MIN: CPT | Performed by: FAMILY MEDICINE

## 2022-04-11 RX ORDER — FAMOTIDINE 10 MG
10 TABLET ORAL DAILY
COMMUNITY
End: 2022-11-08 | Stop reason: DRUGHIGH

## 2022-04-11 NOTE — PROGRESS NOTES
Lenny Rodriguez is a 66 y.o. female.   Chief Complaint   Patient presents with   • Hypertension   • Hyperlipidemia   • Hypothyroidism       History of Present Illness   Presents to the office today to follow-up on chronic medical problems per active problem list as below.  Last visit was just 2 months ago.  She has chronic bronchitis.  I put her on Trelegy.  She has seen Dr. Graves.  Cough is better.  Hardly has to use albuterol at all now.  She uses Trelegy daily.  She ran out for 2 days and did not feel quite right.  Once she got back on it she feels her breathing is back to normal.  She was able to work out in her yard all day yesterday without any cough, wheezing, shortness of breath.    HTN-hypertensions well controlled today at 130/76.  She has had a history of LVH.  LVH had improved as of echo almost 2 years ago.  No signs of fluid overload.  No PND, orthopnea, swelling in her feet.    HLD -lipids have been stable on current dose of atorvastatin 20 mg/day.    Hypothyroidism-thyroid studies have been stable on current dose of Euthyrox.  Last lab work to evaluate these issues was 6 months ago.    Preventive care-she will be due for a DEXA scan and mammogram after August 30 of 2022.      Patient Active Problem List    Diagnosis Date Noted   • Cough 01/03/2022   • Acute nonintractable headache 01/03/2022   • SOB (shortness of breath) 01/03/2022   • Chronic GERD 10/11/2021   • Reactive airway disease without complication 10/05/2020   • Arthritis of right knee 10/04/2020   • LVH (left ventricular hypertrophy) 06/01/2020   • Acquired hypothyroidism 11/25/2019   • Otosclerosis of both ears 11/25/2019     Note Last Updated: 11/25/2019     Replaced bones in ears with metal pistons - NO MRI's     • MRI contraindicated due to metal implant 11/25/2019     Note Last Updated: 11/25/2019     Due to metal implants in inner ear     • Mixed hyperlipidemia 08/15/2019   • Allergic rhinitis 12/26/2018   • Herpes zoster  12/17/2018   • Loss of height 06/25/2018   • Encounter for general adult medical examination without abnormal findings 06/21/2017   • Visit for screening mammogram 06/21/2017   • Obesity with body mass index 30 or greater 06/21/2016   • Hemorrhoids, external 06/11/2012   • Obesity 06/11/2012   • Thyromegaly 06/11/2012   • Vaginitis, atrophic 06/11/2012   • Decreased white blood cell count 06/04/2012   • Essential hypertension 06/04/2012           Past Surgical History:   Procedure Laterality Date   • COLONOSCOPY      NEG = 2014, rech 2024   • INNER EAR SURGERY Bilateral    • THYROIDECTOMY, PARTIAL      Left thyroid mass= Benign   • TONSILLECTOMY     • TUBAL ABDOMINAL LIGATION       Current Outpatient Medications on File Prior to Visit   Medication Sig   • albuterol sulfate  (90 Base) MCG/ACT inhaler Inhale 2 puffs Every 4 (Four) Hours As Needed for Shortness of Air.   • amLODIPine (NORVASC) 5 MG tablet Take 1 tablet by mouth once daily   • aspirin 325 MG tablet Take 1 tablet by mouth Daily.   • atorvastatin (LIPITOR) 20 MG tablet TAKE 1 TABLET BY MOUTH ONCE DAILY AT NIGHT   • Euthyrox 112 MCG tablet Take 1 tablet by mouth once daily   • famotidine (PEPCID) 10 MG tablet Take 10 mg by mouth Daily.   • Trelegy Ellipta 100-62.5-25 MCG/INH inhaler Inhale 1 puff Daily.   • triamterene-hydrochlorothiazide (MAXZIDE-25) 37.5-25 MG per tablet Take 1 tablet by mouth once daily   • [DISCONTINUED] benzonatate (Tessalon Perles) 100 MG capsule Take 1-2 capsules by mouth 3 (Three) Times a Day As Needed for Cough.   • [DISCONTINUED] cholecalciferol (VITAMIN D3) 25 MCG (1000 UT) tablet Take 1,000 Units by mouth Daily.     No current facility-administered medications on file prior to visit.     No Known Allergies  Social History     Socioeconomic History   • Marital status:      Spouse name: Wilfrido   • Number of children: 2   Tobacco Use   • Smoking status: Never Smoker   • Smokeless tobacco: Never Used   Vaping Use   •  "Vaping Use: Never used   Substance and Sexual Activity   • Alcohol use: No   • Drug use: No   • Sexual activity: Yes     Partners: Male     Birth control/protection: None     Family History   Problem Relation Age of Onset   • COPD Mother    • Heart disease Father    • Heart disease Brother    • Lymphoma Brother    • Colon cancer Paternal Grandmother        Review of Systems    Objective   /76 (BP Location: Left arm, Patient Position: Sitting, Cuff Size: Adult)   Pulse 73   Temp 98 °F (36.7 °C) (Infrared)   Resp 18   Ht 160 cm (62.99\")   Wt 78.9 kg (174 lb)   LMP  (LMP Unknown)   SpO2 97%   Breastfeeding No   BMI 30.83 kg/m²   Physical Exam  Constitutional:       Appearance: She is well-developed.      Comments: Wearing a face mask     HENT:      Head: Normocephalic and atraumatic.   Eyes:      Conjunctiva/sclera: Conjunctivae normal.   Cardiovascular:      Rate and Rhythm: Normal rate and regular rhythm.      Heart sounds: Murmur heard.    Systolic murmur is present with a grade of 2/6.  Pulmonary:      Effort: Pulmonary effort is normal.      Breath sounds: Normal breath sounds. No decreased breath sounds, wheezing, rhonchi or rales.   Musculoskeletal:         General: Normal range of motion.      Cervical back: Normal range of motion.      Right lower leg: No edema.      Left lower leg: No edema.   Skin:     General: Skin is warm and dry.      Findings: No rash.   Neurological:      Mental Status: She is alert and oriented to person, place, and time.   Psychiatric:         Behavior: Behavior normal.           Office Visit on 02/18/2022   Component Date Value Ref Range Status   • SARS-CoV-2, JORGE 02/18/2022 Not Detected  Not Detected Final    Comment: This nucleic acid amplification test was developed and its performance  characteristics determined by [facility] [employer]. Nucleic acid  amplification tests include RT-PCR and TMA. This test has not been  FDA cleared or approved. This test has been " authorized by FDA under  an Emergency Use Authorization (EUA). This test is only authorized  for the duration of time the declaration that circumstances exist  justifying the authorization of the emergency use of in vitro  diagnostic tests for detection of SARS-CoV-2 virus and/or diagnosis  of COVID-19 infection under section 564(b)(1) of the Act, 21 U.S.C.  360bbb-3(b) (1), unless the authorization is terminated or revoked  sooner.  When diagnostic testing is negative, the possibility of a false  negative result should be considered in the context of a patient's  recent exposures and the presence of clinical signs and symptoms  consistent with COVID-19. An individual without symptoms of COVID-19  and who is not shedding SARS-CoV-2 virus wo                           uld expect to have a  negative (not detected) result in this assay.     • [facility] SARS-COV-2, JORGE 2 DAY TAT 02/18/2022 Performed   Final   Hospital Outpatient Visit on 01/21/2022   Component Date Value Ref Range Status   • Creatinine 01/21/2022 0.90  0.60 - 1.30 mg/dL Final    Serial Number: [id number]:  [id number]   • GFR MDRD Non  01/21/2022 62 (A) 0 - 60 mL/min/1.73 sq.M Final   Office Visit on 01/18/2022   Component Date Value Ref Range Status   • SARS-CoV-2, JORGE 01/18/2022 Not Detected  Not Detected Final    Comment: This nucleic acid amplification test was developed and its performance  characteristics determined by [facility]. Nucleic acid  amplification tests include RT-PCR and TMA. This test has not been  FDA cleared or approved. This test has been authorized by FDA under  an Emergency Use Authorization (EUA). This test is only authorized  for the duration of time the declaration that circumstances exist  justifying the authorization of the emergency use of in vitro  diagnostic tests for detection of SARS-CoV-2 virus and/or diagnosis  of COVID-19 infection under section 564(b)(1) of the Act, 21 U.S.C.  360bbb-3(b) (1),  unless the authorization is terminated or revoked  sooner.  When diagnostic testing is negative, the possibility of a false  negative result should be considered in the context of a patient's  recent exposures and the presence of clinical signs and symptoms  consistent with COVID-19. An individual without symptoms of COVID-19  and who is not shedding SARS-CoV-2 virus wo                           uld expect to have a  negative (not detected) result in this assay.     • [facility] SARS-COV-2, JORGE 2 DAY TAT 01/18/2022 Performed   Final   Office Visit on 01/03/2022   Component Date Value Ref Range Status   • SARS-CoV-2, JORGE 01/03/2022 Not Detected  Not Detected Final    Comment: This nucleic acid amplification test was developed and its performance  characteristics determined by [facility]. Nucleic acid  amplification tests include RT-PCR and TMA. This test has not been  FDA cleared or approved. This test has been authorized by FDA under  an Emergency Use Authorization (EUA). This test is only authorized  for the duration of time the declaration that circumstances exist  justifying the authorization of the emergency use of in vitro  diagnostic tests for detection of SARS-CoV-2 virus and/or diagnosis  of COVID-19 infection under section 564(b)(1) of the Act, 21 U.S.C.  360bbb-3(b) (1), unless the authorization is terminated or revoked  sooner.  When diagnostic testing is negative, the possibility of a false  negative result should be considered in the context of a patient's  recent exposures and the presence of clinical signs and symptoms  consistent with COVID-19. An individual without symptoms of COVID-19  and who is not shedding SARS-CoV-2 virus wo                           uld expect to have a  negative (not detected) result in this assay.     • [facility] SARS-COV-2, JORGE 2 DAY TAT 01/03/2022 Performed   Final         Assessment/Plan   Diagnoses and all orders for this visit:    1. Chronic bronchitis, unspecified  chronic bronchitis type (HCC) (Primary)    2. Essential hypertension  -     Adult Transthoracic Echo Complete W/ Cont if Necessary Per Protocol; Future  -     Comprehensive Metabolic Panel  -     CBC & Differential    3. Acquired hypothyroidism  -     TSH  -     T4, Free  -     T3, Free    4. Encounter for screening mammogram for malignant neoplasm of breast  -     Mammo Screening Digital Tomosynthesis Bilateral With CAD; Future    5. LVH (left ventricular hypertrophy)  -     Adult Transthoracic Echo Complete W/ Cont if Necessary Per Protocol; Future    6. Mixed hyperlipidemia  -     Lipid Panel    7. Need for hepatitis C screening test  -     Hepatitis C Antibody    8. Postmenopausal  -     DEXA Bone Density Axial; Future    Multiple chronic problems as above.  Her chronic bronchitis is currently stable.  Continue Trelegy at current dose.  Continue current antihypertensive medications at current doses.  Blood pressures well controlled.  We will do an echo in June of this year to follow-up on her LVH.  Labs today as above regarding her chronic medical problems.  We will make adjustments to medicines as needed.  We will plan DEXA scan and mammogram after September 1 of 2021.  We will follow up with her when the results of her blood work and other tests are back.  Follow-up in 6 months or sooner if needed for any other problems.        Call with any problems or concerns before next visit       Return in about 6 months (around 10/11/2022).      Much of this report is an electronic transcription of spoken language to printed text using Dragon dictation software.  As such, the subtleties and finesse of spoken language may permit erroneous, or at times, nonsensical words or phrases to be inadvertently transcribed; thus changes may be made at a later date to rectify these errors.     Kerri Almanza MD4/11/202208:35 EDT  This note has been electronically signed

## 2022-04-12 DIAGNOSIS — E83.52 HYPERCALCEMIA: Primary | ICD-10-CM

## 2022-04-12 LAB
ALBUMIN SERPL-MCNC: 4.2 G/DL (ref 3.8–4.8)
ALBUMIN/GLOB SERPL: 2 {RATIO} (ref 1.2–2.2)
ALP SERPL-CCNC: 169 IU/L (ref 44–121)
ALT SERPL-CCNC: 35 IU/L (ref 0–32)
AST SERPL-CCNC: 23 IU/L (ref 0–40)
BASOPHILS # BLD AUTO: 0.1 X10E3/UL (ref 0–0.2)
BASOPHILS NFR BLD AUTO: 2 %
BILIRUB SERPL-MCNC: 0.5 MG/DL (ref 0–1.2)
BUN SERPL-MCNC: 19 MG/DL (ref 8–27)
BUN/CREAT SERPL: 26 (ref 12–28)
CALCIUM SERPL-MCNC: 10.9 MG/DL (ref 8.7–10.3)
CHLORIDE SERPL-SCNC: 103 MMOL/L (ref 96–106)
CHOLEST SERPL-MCNC: 163 MG/DL (ref 100–199)
CO2 SERPL-SCNC: 24 MMOL/L (ref 20–29)
CREAT SERPL-MCNC: 0.74 MG/DL (ref 0.57–1)
EGFRCR SERPLBLD CKD-EPI 2021: 88 ML/MIN/1.73
EOSINOPHIL # BLD AUTO: 0.5 X10E3/UL (ref 0–0.4)
EOSINOPHIL NFR BLD AUTO: 10 %
ERYTHROCYTE [DISTWIDTH] IN BLOOD BY AUTOMATED COUNT: 12.3 % (ref 11.7–15.4)
GLOBULIN SER CALC-MCNC: 2.1 G/DL (ref 1.5–4.5)
GLUCOSE SERPL-MCNC: 94 MG/DL (ref 65–99)
HCT VFR BLD AUTO: 41.1 % (ref 34–46.6)
HCV AB S/CO SERPL IA: <0.1 S/CO RATIO (ref 0–0.9)
HDLC SERPL-MCNC: 52 MG/DL
HGB BLD-MCNC: 13.9 G/DL (ref 11.1–15.9)
IMM GRANULOCYTES # BLD AUTO: 0 X10E3/UL (ref 0–0.1)
IMM GRANULOCYTES NFR BLD AUTO: 0 %
LDLC SERPL CALC-MCNC: 95 MG/DL (ref 0–99)
LYMPHOCYTES # BLD AUTO: 1.2 X10E3/UL (ref 0.7–3.1)
LYMPHOCYTES NFR BLD AUTO: 24 %
MCH RBC QN AUTO: 29.6 PG (ref 26.6–33)
MCHC RBC AUTO-ENTMCNC: 33.8 G/DL (ref 31.5–35.7)
MCV RBC AUTO: 87 FL (ref 79–97)
MONOCYTES # BLD AUTO: 0.4 X10E3/UL (ref 0.1–0.9)
MONOCYTES NFR BLD AUTO: 7 %
NEUTROPHILS # BLD AUTO: 2.9 X10E3/UL (ref 1.4–7)
NEUTROPHILS NFR BLD AUTO: 57 %
PLATELET # BLD AUTO: 248 X10E3/UL (ref 150–450)
POTASSIUM SERPL-SCNC: 3.4 MMOL/L (ref 3.5–5.2)
PROT SERPL-MCNC: 6.3 G/DL (ref 6–8.5)
RBC # BLD AUTO: 4.7 X10E6/UL (ref 3.77–5.28)
SODIUM SERPL-SCNC: 141 MMOL/L (ref 134–144)
T3FREE SERPL-MCNC: 3.1 PG/ML (ref 2–4.4)
T4 FREE SERPL-MCNC: 1.64 NG/DL (ref 0.82–1.77)
TRIGL SERPL-MCNC: 83 MG/DL (ref 0–149)
TSH SERPL DL<=0.005 MIU/L-ACNC: 0.32 UIU/ML (ref 0.45–4.5)
VLDLC SERPL CALC-MCNC: 16 MG/DL (ref 5–40)
WBC # BLD AUTO: 5 X10E3/UL (ref 3.4–10.8)

## 2022-04-14 ENCOUNTER — TELEPHONE (OUTPATIENT)
Dept: FAMILY MEDICINE CLINIC | Facility: CLINIC | Age: 67
End: 2022-04-14

## 2022-04-14 LAB
25(OH)D3+25(OH)D2 SERPL-MCNC: 26.3 NG/ML (ref 30–100)
CA-I SERPL ISE-MCNC: 6.3 MG/DL (ref 4.5–5.6)
PTH-INTACT SERPL-MCNC: 63 PG/ML (ref 15–65)

## 2022-04-14 NOTE — TELEPHONE ENCOUNTER
LET PATIENT KNOW THAT THEY WAS NOT ALL BACK YET AND HE HAS NOT SEEN THEM. I WILL CALL HER WHEN THEY CAME BACK.

## 2022-04-14 NOTE — TELEPHONE ENCOUNTER
PATIENT IS CALLING IN ABOUT LAB RESULTS FROM THE OTHER DAY.    PLEASE ADVISE    CALLBACK NUMBER IS  [phone]

## 2022-04-18 DIAGNOSIS — E83.52 HYPERCALCEMIA: Primary | ICD-10-CM

## 2022-04-25 DIAGNOSIS — I10 ESSENTIAL HYPERTENSION: ICD-10-CM

## 2022-04-25 RX ORDER — AMLODIPINE BESYLATE 5 MG/1
TABLET ORAL
Qty: 90 TABLET | Refills: 0 | Status: SHIPPED | OUTPATIENT
Start: 2022-04-25 | End: 2022-07-13

## 2022-05-04 DIAGNOSIS — E78.2 MIXED HYPERLIPIDEMIA: ICD-10-CM

## 2022-05-05 RX ORDER — ATORVASTATIN CALCIUM 20 MG/1
TABLET, FILM COATED ORAL
Qty: 90 TABLET | Refills: 0 | Status: SHIPPED | OUTPATIENT
Start: 2022-05-05 | End: 2022-08-08

## 2022-05-06 ENCOUNTER — TELEPHONE (OUTPATIENT)
Dept: FAMILY MEDICINE CLINIC | Facility: CLINIC | Age: 67
End: 2022-05-06

## 2022-05-06 DIAGNOSIS — I51.7 LVH (LEFT VENTRICULAR HYPERTROPHY): ICD-10-CM

## 2022-05-06 DIAGNOSIS — I10 ESSENTIAL HYPERTENSION: Primary | ICD-10-CM

## 2022-05-06 RX ORDER — INDAPAMIDE 2.5 MG/1
2.5 TABLET, FILM COATED ORAL EVERY MORNING
Qty: 30 TABLET | Refills: 2 | Status: SHIPPED | OUTPATIENT
Start: 2022-05-06 | End: 2022-05-25 | Stop reason: SDUPTHER

## 2022-05-06 NOTE — TELEPHONE ENCOUNTER
Spoke with patient voiced she understood and wanted me to send it to her through my chart. Message was sent.

## 2022-05-06 NOTE — TELEPHONE ENCOUNTER
Thanks for the follow-up.  Certainly sounds like she does need something to replace the Maxide.  If we have to, we can go back to it since we know her calcium levels came down when she was off of it.  I would like her to try medicine called indapamide.  It is also a water pill.  It is 1 pill once a day.  Let me know in a few days if she has had problems with increased swelling, blood pressure going up, weight going up, etc.  Thanks

## 2022-05-06 NOTE — TELEPHONE ENCOUNTER
Patient stop the HCTZ for a week she gained 4 lbs with ankle, legs and feet swelling. She states she did the labs on Wednesday and she went home and took the HCTZ and everything is back to normal. She has lost the 4 lbs. Her blood pressure back on it was 127/72. Please advise.

## 2022-05-06 NOTE — TELEPHONE ENCOUNTER
Caller: Jonathan Rodriguez    Relationship: Self    Best call back number: [phone]    What medications are you currently taking:   Current Outpatient Medications on File Prior to Visit   Medication Sig Dispense Refill   • albuterol sulfate  (90 Base) MCG/ACT inhaler Inhale 2 puffs Every 4 (Four) Hours As Needed for Shortness of Air. 18 g 5   • amLODIPine (NORVASC) 5 MG tablet Take 1 tablet by mouth once daily 90 tablet 0   • aspirin 325 MG tablet Take 1 tablet by mouth Daily.     • atorvastatin (LIPITOR) 20 MG tablet TAKE 1 TABLET BY MOUTH ONCE DAILY AT NIGHT 90 tablet 0   • Euthyrox 112 MCG tablet Take 1 tablet by mouth once daily 90 tablet 3   • famotidine (PEPCID) 10 MG tablet Take 10 mg by mouth Daily.     • Trelegy Ellipta 100-62.5-25 MCG/INH inhaler Inhale 1 puff Daily.     • triamterene-hydrochlorothiazide (MAXZIDE-25) 37.5-25 MG per tablet Take 1 tablet by mouth once daily 90 tablet 3     No current facility-administered medications on file prior to visit.          When did you start taking these medications: YEARS AGO    Which medication are you concerned about: HYDROCHLOROTHIAZIDE    Who prescribed you this medication: DR MARIN     What are your concerns: WANTS TO DISCUSS HER BLOOD PRESSURE WITH THE MEDICATION POSSIBLE MEDICATION CHANGE.  SAYS SHE GAINED WEIGHT WHILE SHE WAS NOT TAKING IT AND BLOOD PRESSURE WAS REALLY HIGH AS WELL.      How long have you had these concerns:

## 2022-05-20 ENCOUNTER — TELEPHONE (OUTPATIENT)
Dept: FAMILY MEDICINE CLINIC | Facility: CLINIC | Age: 67
End: 2022-05-20

## 2022-05-20 NOTE — TELEPHONE ENCOUNTER
THE PATIENT STATES THAT THE indapamide (LOZOL) 2.5 MG tablet MEDICATION IS CAUSING HER FEET AND ANKLES TO SWELL. HER SHOES ARE TIGHT ON HER. THE PATIENT WOULD LIKE TO GO BACK TO THE HYDROCHLOROTHIAZIDE PILL INSTEAD. THE PATIENT WOULD LIKE TO BE ADVISE - THE OFFICE CAN REACH HER [phone].

## 2022-05-20 NOTE — TELEPHONE ENCOUNTER
This medication is also a diuretic.  It is unlikely that is causing her feet to swell.  I would like her to take 2/day over the weekend, starting today.  That is the maximum dose.  If the swelling is not starting to come down by Monday, then we will switch her back to the hydrochlorothiazide.  We will just have to watch her calcium level and her lab work, which we can do.  Thanks

## 2022-05-25 ENCOUNTER — TELEPHONE (OUTPATIENT)
Dept: FAMILY MEDICINE CLINIC | Facility: CLINIC | Age: 67
End: 2022-05-25

## 2022-05-25 DIAGNOSIS — I10 ESSENTIAL HYPERTENSION: ICD-10-CM

## 2022-05-25 DIAGNOSIS — I51.7 LVH (LEFT VENTRICULAR HYPERTROPHY): ICD-10-CM

## 2022-05-25 RX ORDER — INDAPAMIDE 2.5 MG/1
5 TABLET, FILM COATED ORAL EVERY MORNING
Qty: 60 TABLET | Refills: 5 | Status: SHIPPED | OUTPATIENT
Start: 2022-05-25 | End: 2022-11-22

## 2022-05-25 NOTE — TELEPHONE ENCOUNTER
Caller: Jonathan Rodriguez    Relationship: Self    Best call back number: [phone]    Who are you requesting to speak with (clinical staff, provider,  specific staff member): DR ANEESH MARIN OR MA    What was the call regarding: PATIENT STATES THAT SHE WAS ADVISED BY DR MARIN TO BEGIN TAKING 2 TABLETS OF indapamide (LOZOL) 2.5 MG tablet. SOON AFTER, THE SWELLING IN HER FEET AND ANKLES BEGAN TO GO DOWN, AND HER BLOOD PRESSURE HAS LOWERED AS WELL. SHE REQUESTS A CALL BACK TO CONFIRM IF SHE SHOULD CONTINUE TAKING THE MEDICATION AT THIS DOSAGE, AND IF SO, TO GET A REFILL OF MEDICATION AS SHE WILL BE OUT SOON    Do you require a callback: YES

## 2022-05-25 NOTE — TELEPHONE ENCOUNTER
I am glad to hear that it is finally helping both her swelling and blood pressure.  I have sent a new prescription down to [facility] pharmacy for 2 tablets daily.  I would recommend she stay on this.  We when I see her again in the fall we will recheck her labs.  Thanks

## 2022-06-06 ENCOUNTER — APPOINTMENT (OUTPATIENT)
Dept: CARDIOLOGY | Facility: HOSPITAL | Age: 67
End: 2022-06-06

## 2022-06-06 ENCOUNTER — TELEPHONE (OUTPATIENT)
Dept: FAMILY MEDICINE CLINIC | Facility: CLINIC | Age: 67
End: 2022-06-06

## 2022-06-06 NOTE — TELEPHONE ENCOUNTER
Caller: Jonathan Rodriguez    Relationship to patient: Self    Best call back number: [phone]    Date of positive COVID19 test: 6/5/2022    COVID19 symptoms: COUGH, STUFFY HEAD, RUNNY NOSE    Additional information or concerns: PATIENT CALLED TO DISCUSS OPTION WITH A POSITIVE COVID TEST. SHE IS ASKING ABOUT MEDICINE AND INFO ON THE INFUSION. PLEASE CALL BACK    What is the patients preferred pharmacy: [address] - [facility] [place] [phone] [place] [phone]

## 2022-06-06 NOTE — TELEPHONE ENCOUNTER
Spoke with patient let her know that the infusions are no longer, she can do mucinex, dylsem and tylenol over the counter. If she has shortness of breath to report to the er. Also told her to quarantine for the 5 days, rest and push fluids voiced she understood.

## 2022-06-14 ENCOUNTER — HOSPITAL ENCOUNTER (OUTPATIENT)
Dept: CARDIOLOGY | Facility: HOSPITAL | Age: 67
Discharge: HOME | End: 2022-06-14
Admitting: FAMILY MEDICINE

## 2022-06-14 VITALS
WEIGHT: 174 LBS | BODY MASS INDEX: 32.02 KG/M2 | DIASTOLIC BLOOD PRESSURE: 76 MMHG | SYSTOLIC BLOOD PRESSURE: 139 MMHG | HEIGHT: 62 IN

## 2022-06-14 DIAGNOSIS — I10 ESSENTIAL HYPERTENSION: ICD-10-CM

## 2022-06-14 DIAGNOSIS — I51.7 LVH (LEFT VENTRICULAR HYPERTROPHY): ICD-10-CM

## 2022-06-14 LAB
BH CV ECHO MEAS - ACS: 1.92 CM
BH CV ECHO MEAS - AI P1/2T: 2445 MSEC
BH CV ECHO MEAS - AO MAX PG: 7 MMHG
BH CV ECHO MEAS - AO MEAN PG: 4.2 MMHG
BH CV ECHO MEAS - AO ROOT DIAM: 3 CM
BH CV ECHO MEAS - AO V2 MAX: 132.7 CM/SEC
BH CV ECHO MEAS - AO V2 VTI: 27.9 CM
BH CV ECHO MEAS - AVA(I,D): 2.7 CM2
BH CV ECHO MEAS - EDV(CUBED): 65.9 ML
BH CV ECHO MEAS - EDV(MOD-SP4): 60.2 ML
BH CV ECHO MEAS - EF(MOD-SP4): 69.1 %
BH CV ECHO MEAS - ESV(CUBED): 20.8 ML
BH CV ECHO MEAS - ESV(MOD-SP4): 18.6 ML
BH CV ECHO MEAS - FS: 31.9 %
BH CV ECHO MEAS - IVS/LVPW: 1.11 CM
BH CV ECHO MEAS - IVSD: 0.98 CM
BH CV ECHO MEAS - LA DIMENSION: 3.1 CM
BH CV ECHO MEAS - LV DIASTOLIC VOL/BSA (35-75): 36.2 CM2
BH CV ECHO MEAS - LV MASS(C)D: 117.8 GRAMS
BH CV ECHO MEAS - LV MAX PG: 4.4 MMHG
BH CV ECHO MEAS - LV MEAN PG: 2.41 MMHG
BH CV ECHO MEAS - LV SYSTOLIC VOL/BSA (12-30): 11.2 CM2
BH CV ECHO MEAS - LV V1 MAX: 105 CM/SEC
BH CV ECHO MEAS - LV V1 VTI: 24.4 CM
BH CV ECHO MEAS - LVIDD: 4 CM
BH CV ECHO MEAS - LVIDS: 2.8 CM
BH CV ECHO MEAS - LVOT AREA: 3 CM2
BH CV ECHO MEAS - LVOT DIAM: 1.97 CM
BH CV ECHO MEAS - LVPWD: 0.89 CM
BH CV ECHO MEAS - MV A MAX VEL: 102.5 CM/SEC
BH CV ECHO MEAS - MV DEC SLOPE: 298.8 CM/SEC2
BH CV ECHO MEAS - MV DEC TIME: 0.28 MSEC
BH CV ECHO MEAS - MV E MAX VEL: 83.1 CM/SEC
BH CV ECHO MEAS - MV E/A: 0.81
BH CV ECHO MEAS - MV MAX PG: 5.9 MMHG
BH CV ECHO MEAS - MV MEAN PG: 2.08 MMHG
BH CV ECHO MEAS - MV V2 VTI: 32.1 CM
BH CV ECHO MEAS - MVA(VTI): 2.31 CM2
BH CV ECHO MEAS - PA ACC TIME: 0.13 SEC
BH CV ECHO MEAS - PA PR(ACCEL): 18.3 MMHG
BH CV ECHO MEAS - PA V2 MAX: 76.6 CM/SEC
BH CV ECHO MEAS - PULM A REVS DUR: 0.09 SEC
BH CV ECHO MEAS - PULM A REVS VEL: 42.8 CM/SEC
BH CV ECHO MEAS - PULM DIAS VEL: 44.3 CM/SEC
BH CV ECHO MEAS - PULM S/D: 1.56
BH CV ECHO MEAS - PULM SYS VEL: 69.2 CM/SEC
BH CV ECHO MEAS - RAP SYSTOLE: 3 MMHG
BH CV ECHO MEAS - RV MAX PG: 1.56 MMHG
BH CV ECHO MEAS - RV V1 MAX: 62.4 CM/SEC
BH CV ECHO MEAS - RV V1 VTI: 13.8 CM
BH CV ECHO MEAS - RVDD: 2.9 CM
BH CV ECHO MEAS - RVSP: 20.1 MMHG
BH CV ECHO MEAS - SI(MOD-SP4): 25 ML/M2
BH CV ECHO MEAS - SV(LVOT): 74 ML
BH CV ECHO MEAS - SV(MOD-SP4): 41.6 ML
BH CV ECHO MEAS - TR MAX PG: 17.1 MMHG
BH CV ECHO MEAS - TR MAX VEL: 204.9 CM/SEC
MAXIMAL PREDICTED HEART RATE: 152 BPM
STRESS TARGET HR: 129 BPM

## 2022-06-14 PROCEDURE — 93306 TTE W/DOPPLER COMPLETE: CPT | Performed by: INTERNAL MEDICINE

## 2022-06-14 PROCEDURE — 93306 TTE W/DOPPLER COMPLETE: CPT

## 2022-07-13 DIAGNOSIS — I10 ESSENTIAL HYPERTENSION: ICD-10-CM

## 2022-07-13 RX ORDER — AMLODIPINE BESYLATE 5 MG/1
TABLET ORAL
Qty: 90 TABLET | Refills: 0 | Status: SHIPPED | OUTPATIENT
Start: 2022-07-13 | End: 2022-10-18

## 2022-08-06 DIAGNOSIS — E78.2 MIXED HYPERLIPIDEMIA: ICD-10-CM

## 2022-08-06 DIAGNOSIS — E03.9 ACQUIRED HYPOTHYROIDISM: ICD-10-CM

## 2022-08-08 RX ORDER — ATORVASTATIN CALCIUM 20 MG/1
TABLET, FILM COATED ORAL
Qty: 90 TABLET | Refills: 0 | Status: SHIPPED | OUTPATIENT
Start: 2022-08-08 | End: 2022-11-07

## 2022-08-08 RX ORDER — LEVOTHYROXINE SODIUM 112 UG/1
TABLET ORAL
Qty: 90 TABLET | Refills: 0 | Status: SHIPPED | OUTPATIENT
Start: 2022-08-08 | End: 2022-11-07

## 2022-09-01 ENCOUNTER — HOSPITAL ENCOUNTER (OUTPATIENT)
Dept: BONE DENSITY | Facility: HOSPITAL | Age: 67
Discharge: HOME | End: 2022-09-01

## 2022-09-01 ENCOUNTER — HOSPITAL ENCOUNTER (OUTPATIENT)
Dept: MAMMOGRAPHY | Facility: HOSPITAL | Age: 67
Discharge: HOME | End: 2022-09-01

## 2022-09-01 DIAGNOSIS — Z78.0 POSTMENOPAUSAL: ICD-10-CM

## 2022-09-01 DIAGNOSIS — Z12.31 ENCOUNTER FOR SCREENING MAMMOGRAM FOR MALIGNANT NEOPLASM OF BREAST: ICD-10-CM

## 2022-09-01 PROCEDURE — 77080 DXA BONE DENSITY AXIAL: CPT

## 2022-09-01 PROCEDURE — 77067 SCR MAMMO BI INCL CAD: CPT

## 2022-09-01 PROCEDURE — 77063 BREAST TOMOSYNTHESIS BI: CPT

## 2022-10-18 DIAGNOSIS — I10 ESSENTIAL HYPERTENSION: ICD-10-CM

## 2022-10-18 RX ORDER — AMLODIPINE BESYLATE 5 MG/1
TABLET ORAL
Qty: 90 TABLET | Refills: 0 | Status: SHIPPED | OUTPATIENT
Start: 2022-10-18 | End: 2023-01-16

## 2022-11-05 DIAGNOSIS — E03.9 ACQUIRED HYPOTHYROIDISM: ICD-10-CM

## 2022-11-05 DIAGNOSIS — E78.2 MIXED HYPERLIPIDEMIA: ICD-10-CM

## 2022-11-07 RX ORDER — ATORVASTATIN CALCIUM 20 MG/1
TABLET, FILM COATED ORAL
Qty: 90 TABLET | Refills: 0 | Status: SHIPPED | OUTPATIENT
Start: 2022-11-07 | End: 2023-01-30

## 2022-11-07 RX ORDER — LEVOTHYROXINE SODIUM 112 UG/1
TABLET ORAL
Qty: 90 TABLET | Refills: 0 | Status: SHIPPED | OUTPATIENT
Start: 2022-11-07 | End: 2023-01-23

## 2022-11-08 ENCOUNTER — OFFICE VISIT (OUTPATIENT)
Dept: FAMILY MEDICINE CLINIC | Facility: CLINIC | Age: 67
End: 2022-11-08

## 2022-11-08 VITALS
WEIGHT: 163.6 LBS | DIASTOLIC BLOOD PRESSURE: 82 MMHG | OXYGEN SATURATION: 97 % | HEART RATE: 73 BPM | TEMPERATURE: 97.7 F | RESPIRATION RATE: 18 BRPM | SYSTOLIC BLOOD PRESSURE: 140 MMHG | HEIGHT: 62 IN | BODY MASS INDEX: 30.11 KG/M2

## 2022-11-08 DIAGNOSIS — J45.40 MODERATE PERSISTENT REACTIVE AIRWAY DISEASE WITHOUT COMPLICATION: ICD-10-CM

## 2022-11-08 DIAGNOSIS — M17.0 PRIMARY OSTEOARTHRITIS OF BOTH KNEES: ICD-10-CM

## 2022-11-08 DIAGNOSIS — E83.52 HYPERCALCEMIA: ICD-10-CM

## 2022-11-08 DIAGNOSIS — E78.2 MIXED HYPERLIPIDEMIA: ICD-10-CM

## 2022-11-08 DIAGNOSIS — I10 ESSENTIAL HYPERTENSION: Primary | ICD-10-CM

## 2022-11-08 DIAGNOSIS — E03.9 ACQUIRED HYPOTHYROIDISM: ICD-10-CM

## 2022-11-08 PROCEDURE — 99214 OFFICE O/P EST MOD 30 MIN: CPT | Performed by: FAMILY MEDICINE

## 2022-11-08 RX ORDER — BUDESONIDE AND FORMOTEROL FUMARATE DIHYDRATE 160; 4.5 UG/1; UG/1
2 AEROSOL RESPIRATORY (INHALATION)
COMMUNITY

## 2022-11-08 RX ORDER — FAMOTIDINE 40 MG/1
40 TABLET, FILM COATED ORAL DAILY
COMMUNITY
Start: 2022-10-25

## 2022-11-08 NOTE — PROGRESS NOTES
Subjective   Jonathan Rodriguez is a 67 y.o. female.   Chief Complaint   Patient presents with   • Hypertension   • Hypothyroidism   • Hyperlipidemia   • Knee Pain       History of Present Illness   67-year-old white female presents to the office today to follow-up on chronic medical problems per active problem list as below.  Last lab work to follow her chronic problems was April of this year.    HTN-she is on indapamide, amlodipine and her blood pressure is reasonably stable at 140/82.    Hypothyroidism-she is on levothyroxine 112 mcg/day.  T3 and T4 were normal, but TSH was a little low at 0.32.  We decided not to change her Synthroid dose.    Hyperlipidemia-she is on Lipitor 20 mg/day and tolerating this without myalgias.  Lipid panel 6 to 7 months ago was excellent.    She had a complaint of a chronic cough with shortness of breath and was actually referred on to pulmonology.  Final diagnosis was reactive airway disease without complication.  Dr. Graves put her on Trelegy and albuterol inhaler as needed.    She has had mild chronic hypercalcemia.  PTH was normal 6 months ago.  Most recent calcium level in May was normal at 10.3.    She had a DEXA scan and mammogram on September 1..  She had osteopenia and mammogram was negative.    She has new complaint today of left knee pain for 2 weeks.  She went to take care of her grandchildren and did quite a bit of stair climbing.  Since that time her left knee has become intensely painful.  It prevents her from sleeping at night.  If she walks very far the knee becomes very painful and she will have to sit down and stretch her leg out.  Sometimes it feels like the knee is going to lock and give out and she will fall.  She has started keeping something nearby to brace herself in case that happens.  She has been following with Dr. Malloy because of her knees.  He did a CT arthrogram of her right knee 2 years ago and it showed quite a bit of intra-articular damage.  They wanted her  to get x-rays of her knees before a follow-up shot visit in late December.      Patient Active Problem List    Diagnosis Date Noted   • Primary osteoarthritis of both knees 11/08/2022   • Cough 01/03/2022   • Acute nonintractable headache 01/03/2022   • SOB (shortness of breath) 01/03/2022   • Chronic GERD 10/11/2021   • Reactive airway disease without complication 10/05/2020   • Arthritis of right knee 10/04/2020   • LVH (left ventricular hypertrophy) 06/01/2020   • Acquired hypothyroidism 11/25/2019   • Otosclerosis of both ears 11/25/2019     Note Last Updated: 11/25/2019     Replaced bones in ears with metal pistons - NO MRI's     • MRI contraindicated due to metal implant 11/25/2019     Note Last Updated: 11/25/2019     Due to metal implants in inner ear     • Mixed hyperlipidemia 08/15/2019   • Allergic rhinitis 12/26/2018   • Herpes zoster 12/17/2018   • Loss of height 06/25/2018   • Encounter for general adult medical examination without abnormal findings 06/21/2017   • Visit for screening mammogram 06/21/2017   • Obesity with body mass index 30 or greater 06/21/2016   • Hemorrhoids, external 06/11/2012   • Obesity 06/11/2012   • Thyromegaly 06/11/2012   • Vaginitis, atrophic 06/11/2012   • Decreased white blood cell count 06/04/2012   • Essential hypertension 06/04/2012           Past Surgical History:   Procedure Laterality Date   • COLONOSCOPY      NEG = 2014, rech 2024   • INNER EAR SURGERY Bilateral    • THYROIDECTOMY, PARTIAL      Left thyroid mass= Benign   • TONSILLECTOMY     • TUBAL ABDOMINAL LIGATION       Current Outpatient Medications on File Prior to Visit   Medication Sig   • albuterol sulfate  (90 Base) MCG/ACT inhaler Inhale 2 puffs Every 4 (Four) Hours As Needed for Shortness of Air.   • amLODIPine (NORVASC) 5 MG tablet Take 1 tablet by mouth once daily   • aspirin 325 MG tablet Take 1 tablet by mouth Daily.   • atorvastatin (LIPITOR) 20 MG tablet TAKE 1 TABLET BY MOUTH ONCE DAILY AT  "NIGHT   • budesonide-formoterol (SYMBICORT) 160-4.5 MCG/ACT inhaler Inhale 2 puffs 2 (Two) Times a Day.   • famotidine (PEPCID) 40 MG tablet Take 1 tablet by mouth Daily.   • indapamide (LOZOL) 2.5 MG tablet Take 2 tablets by mouth Every Morning.   • levothyroxine (SYNTHROID, LEVOTHROID) 112 MCG tablet Take 1 tablet by mouth once daily   • [DISCONTINUED] famotidine (PEPCID) 10 MG tablet Take 10 mg by mouth Daily.   • [DISCONTINUED] Trelegy Ellipta 100-62.5-25 MCG/INH inhaler Inhale 1 puff Daily.     No current facility-administered medications on file prior to visit.     No Known Allergies  Social History     Socioeconomic History   • Marital status:      Spouse name: Wilfrido   • Number of children: 2   Tobacco Use   • Smoking status: Never     Passive exposure: Past   • Smokeless tobacco: Never   Vaping Use   • Vaping Use: Never used   Substance and Sexual Activity   • Alcohol use: No   • Drug use: No   • Sexual activity: Yes     Partners: Male     Birth control/protection: None     Family History   Problem Relation Age of Onset   • COPD Mother    • Heart disease Father    • Heart disease Brother    • Lymphoma Brother    • Colon cancer Paternal Grandmother        Review of Systems    Objective   /82 (BP Location: Left arm, Patient Position: Sitting, Cuff Size: Adult)   Pulse 73   Temp 97.7 °F (36.5 °C) (Infrared)   Resp 18   Ht 157.5 cm (62\")   Wt 74.2 kg (163 lb 9.6 oz)   LMP  (LMP Unknown)   SpO2 97%   Breastfeeding No   BMI 29.92 kg/m²   Physical Exam  Constitutional:       Appearance: She is well-developed.      Comments: Wearing a face mask     HENT:      Head: Normocephalic and atraumatic.   Eyes:      Conjunctiva/sclera: Conjunctivae normal.   Cardiovascular:      Rate and Rhythm: Normal rate.   Pulmonary:      Effort: Pulmonary effort is normal.   Musculoskeletal:         General: Normal range of motion.      Cervical back: Normal range of motion.   Skin:     General: Skin is warm and " dry.      Findings: No rash.   Neurological:      Mental Status: She is alert and oriented to person, place, and time.   Psychiatric:         Behavior: Behavior normal.                 Assessment & Plan   Diagnoses and all orders for this visit:    1. Essential hypertension (Primary)  -     Comprehensive Metabolic Panel    2. Acquired hypothyroidism  -     TSH  -     T4, Free  -     T3, Free    3. Mixed hyperlipidemia    4. Moderate persistent reactive airway disease without complication    5. Hypercalcemia  -     Calcium, Ionized  -     PTH, Intact    6. Primary osteoarthritis of both knees  -     XR Knee 1 or 2 View Left (In Office)  -     XR Knee 1 or 2 View Right (In Office)    Status of multiple chronic medical problems reviewed today.  Hypertension is a chronic problem currently under control.  Continue amlodipine and indapamide at current doses.  Check labs today to monitor status of her hypothyroidism.  Continue levothyroxine 112 mcg/day for now.  We will do labs to follow-up on her mild hypercalcemia.  Keep follow-up with Dr. Graves regarding her reactive airway disease.  She was given unused Symbicort by a friend and she has enough of that until February.  I advised her to talk to her insurance company and see if it would be covered so in February gets here, she will have a heads up as to cost.  We are going to do x-rays of both knees.  I think she is probably torn the cartilage in her left knee.  She had an arthrogram of her right knee a year ago which actually showed quite a bit of pathology in the right knee.  She is due to see Dr. Malloy again in late December for repeat injections in her knees.  Presuming we see significant osteoarthritis on her knees, I think she may need an arthrogram of her left knee.  If she is got a bad torn cartilage, we will certainly be getting her back to Dr. Malloy sooner than late December.  I will follow-up with her when the results of these labs and x-rays are back.        Call  with any problems or concerns before next visit       Return in about 6 months (around 5/8/2023).      Much of this report is an electronic transcription of spoken language to printed text using Dragon dictation software.  As such, the subtleties and finesse of spoken language may permit erroneous, or at times, nonsensical words or phrases to be inadvertently transcribed; thus changes may be made at a later date to rectify these errors.     Kerri Almanza MD11/8/202213:25 EST  This note has been electronically signed

## 2022-11-09 ENCOUNTER — TELEPHONE (OUTPATIENT)
Dept: FAMILY MEDICINE CLINIC | Facility: CLINIC | Age: 67
End: 2022-11-09

## 2022-11-09 DIAGNOSIS — E21.3 HYPERPARATHYROIDISM, UNSPECIFIED: Primary | ICD-10-CM

## 2022-11-09 DIAGNOSIS — M23.92 ACUTE INTERNAL DERANGEMENT OF LEFT KNEE: ICD-10-CM

## 2022-11-09 DIAGNOSIS — E87.6 HYPOKALEMIA: Primary | ICD-10-CM

## 2022-11-09 DIAGNOSIS — M17.0 PRIMARY OSTEOARTHRITIS OF BOTH KNEES: ICD-10-CM

## 2022-11-09 LAB
ALBUMIN SERPL-MCNC: 4.4 G/DL (ref 3.8–4.8)
ALBUMIN/GLOB SERPL: 2.3 {RATIO} (ref 1.2–2.2)
ALP SERPL-CCNC: 118 IU/L (ref 44–121)
ALT SERPL-CCNC: 32 IU/L (ref 0–32)
AST SERPL-CCNC: 33 IU/L (ref 0–40)
BILIRUB SERPL-MCNC: 0.5 MG/DL (ref 0–1.2)
BUN SERPL-MCNC: 20 MG/DL (ref 8–27)
BUN/CREAT SERPL: 24 (ref 12–28)
CA-I SERPL ISE-MCNC: 6.3 MG/DL (ref 4.5–5.6)
CALCIUM SERPL-MCNC: 11 MG/DL (ref 8.7–10.3)
CHLORIDE SERPL-SCNC: 99 MMOL/L (ref 96–106)
CO2 SERPL-SCNC: 27 MMOL/L (ref 20–29)
CREAT SERPL-MCNC: 0.85 MG/DL (ref 0.57–1)
EGFRCR SERPLBLD CKD-EPI 2021: 74 ML/MIN/1.73
GLOBULIN SER CALC-MCNC: 1.9 G/DL (ref 1.5–4.5)
GLUCOSE SERPL-MCNC: 91 MG/DL (ref 70–99)
POTASSIUM SERPL-SCNC: 2.7 MMOL/L (ref 3.5–5.2)
PROT SERPL-MCNC: 6.3 G/DL (ref 6–8.5)
PTH-INTACT SERPL-MCNC: 110 PG/ML (ref 15–65)
SODIUM SERPL-SCNC: 142 MMOL/L (ref 134–144)
T3FREE SERPL-MCNC: 2.6 PG/ML (ref 2–4.4)
T4 FREE SERPL-MCNC: 1.62 NG/DL (ref 0.82–1.77)
TSH SERPL DL<=0.005 MIU/L-ACNC: 0.68 UIU/ML (ref 0.45–4.5)

## 2022-11-09 RX ORDER — POTASSIUM CHLORIDE 20 MEQ/1
20 TABLET, EXTENDED RELEASE ORAL DAILY
Qty: 10 TABLET | Refills: 0 | Status: SHIPPED | OUTPATIENT
Start: 2022-11-09 | End: 2022-11-17 | Stop reason: SDUPTHER

## 2022-11-09 NOTE — TELEPHONE ENCOUNTER
Pt called back.  States there was a misunderstanding.  Spoke with someone else and they have her scheduled now.

## 2022-11-09 NOTE — TELEPHONE ENCOUNTER
SHAWN TALKED TO PATIENT AND TOLD HER THAT THE [facility] NEEDS TO CALL AND LET US KNOW WHAT THEY ARE TALKING ABOUT.

## 2022-11-09 NOTE — TELEPHONE ENCOUNTER
HUB TO READ    LEFT MESSAGE TO RETURN CALL    DR MARIN ORDERED IT JUST LIKE IT WAS ORDERED BEFORE. WONDERING WHO TOLD HER THAT IS WAS NOT DONE AND NEEDED TO ORDER A SHOT PRIOR TO THIS.

## 2022-11-09 NOTE — TELEPHONE ENCOUNTER
Pt states her referral to go to [facility] for ct of knee isn't complete.  They need the injection that is done prior to the CT to be on the order?  Please advise.

## 2022-11-11 ENCOUNTER — TELEPHONE (OUTPATIENT)
Dept: FAMILY MEDICINE CLINIC | Facility: CLINIC | Age: 67
End: 2022-11-11

## 2022-11-11 NOTE — TELEPHONE ENCOUNTER
Talked to marcella at [facility] and he states their are 2 separate orders for this procedure. One is the arthrogram and then the other needs to be a ct lower extremity without and type left knee to follow arthrogram in comments  Pt notified not to go to [facility] Monday and this has to be done at [facility]

## 2022-11-11 NOTE — TELEPHONE ENCOUNTER
Alok from [facility] calling stating the order for patient is wrong, should be a ct lower extremity without for the left knee. She is scheduled Monday. If it is to be an arthrogram that has to be done at the hospital and patient will need to be rescheduled.

## 2022-11-14 ENCOUNTER — TRANSCRIBE ORDERS (OUTPATIENT)
Dept: FAMILY MEDICINE CLINIC | Facility: CLINIC | Age: 67
End: 2022-11-14

## 2022-11-14 ENCOUNTER — APPOINTMENT (OUTPATIENT)
Dept: CT IMAGING | Facility: HOSPITAL | Age: 67
End: 2022-11-14

## 2022-11-17 DIAGNOSIS — E87.6 HYPOKALEMIA: Primary | ICD-10-CM

## 2022-11-17 RX ORDER — POTASSIUM CHLORIDE 20 MEQ/1
TABLET, EXTENDED RELEASE ORAL
Qty: 37 TABLET | Refills: 0 | Status: SHIPPED | OUTPATIENT
Start: 2022-11-17 | End: 2022-12-07 | Stop reason: SDUPTHER

## 2022-11-22 DIAGNOSIS — I10 ESSENTIAL HYPERTENSION: ICD-10-CM

## 2022-11-22 DIAGNOSIS — I51.7 LVH (LEFT VENTRICULAR HYPERTROPHY): ICD-10-CM

## 2022-11-22 RX ORDER — INDAPAMIDE 2.5 MG/1
TABLET, FILM COATED ORAL
Qty: 60 TABLET | Refills: 5 | Status: SHIPPED | OUTPATIENT
Start: 2022-11-22

## 2022-11-23 ENCOUNTER — HOSPITAL ENCOUNTER (OUTPATIENT)
Dept: INTERVENTIONAL RADIOLOGY/VASCULAR | Facility: HOSPITAL | Age: 67
End: 2022-11-23

## 2022-11-23 ENCOUNTER — APPOINTMENT (OUTPATIENT)
Dept: CT IMAGING | Facility: HOSPITAL | Age: 67
End: 2022-11-23

## 2022-11-25 ENCOUNTER — HOSPITAL ENCOUNTER (OUTPATIENT)
Dept: INTERVENTIONAL RADIOLOGY/VASCULAR | Facility: HOSPITAL | Age: 67
Discharge: HOME | End: 2022-11-25

## 2022-11-25 ENCOUNTER — HOSPITAL ENCOUNTER (OUTPATIENT)
Dept: CT IMAGING | Facility: HOSPITAL | Age: 67
Discharge: HOME | End: 2022-11-25

## 2022-11-25 DIAGNOSIS — M17.0 PRIMARY OSTEOARTHRITIS OF BOTH KNEES: ICD-10-CM

## 2022-11-25 DIAGNOSIS — M23.92 ACUTE INTERNAL DERANGEMENT OF LEFT KNEE: ICD-10-CM

## 2022-11-25 PROCEDURE — 73701 CT LOWER EXTREMITY W/DYE: CPT

## 2022-11-25 PROCEDURE — 0 IOPAMIDOL PER 1 ML: Performed by: FAMILY MEDICINE

## 2022-11-25 PROCEDURE — 73580 CONTRAST X-RAY OF KNEE JOINT: CPT

## 2022-11-25 RX ADMIN — IOPAMIDOL 10 ML: 755 INJECTION, SOLUTION INTRAVENOUS at 11:26

## 2022-12-07 DIAGNOSIS — E87.6 HYPOKALEMIA: Primary | ICD-10-CM

## 2022-12-07 DIAGNOSIS — E87.6 HYPOKALEMIA: ICD-10-CM

## 2022-12-07 RX ORDER — POTASSIUM CHLORIDE 20 MEQ/1
TABLET, EXTENDED RELEASE ORAL
Qty: 60 TABLET | Refills: 2 | Status: SHIPPED | OUTPATIENT
Start: 2022-12-07 | End: 2023-03-02

## 2022-12-13 ENCOUNTER — APPOINTMENT (OUTPATIENT)
Dept: CT IMAGING | Facility: HOSPITAL | Age: 67
End: 2022-12-13

## 2023-01-14 DIAGNOSIS — I10 ESSENTIAL HYPERTENSION: ICD-10-CM

## 2023-01-16 RX ORDER — AMLODIPINE BESYLATE 5 MG/1
TABLET ORAL
Qty: 90 TABLET | Refills: 2 | Status: SHIPPED | OUTPATIENT
Start: 2023-01-16

## 2023-01-18 ENCOUNTER — OFFICE VISIT (OUTPATIENT)
Dept: ENDOCRINOLOGY | Facility: CLINIC | Age: 68
End: 2023-01-18
Payer: MEDICARE

## 2023-01-18 VITALS
WEIGHT: 164.4 LBS | BODY MASS INDEX: 30.25 KG/M2 | SYSTOLIC BLOOD PRESSURE: 124 MMHG | HEIGHT: 62 IN | HEART RATE: 73 BPM | DIASTOLIC BLOOD PRESSURE: 72 MMHG

## 2023-01-18 DIAGNOSIS — E21.0 PRIMARY HYPERPARATHYROIDISM: Primary | ICD-10-CM

## 2023-01-18 PROCEDURE — 99203 OFFICE O/P NEW LOW 30 MIN: CPT | Performed by: INTERNAL MEDICINE

## 2023-01-18 NOTE — PATIENT INSTRUCTIONS
Continue exercise as able.  Drink plenty of water.  Get the urine jug.  Collect 24h urine for calcium & creatinine.  Take back to lab.  Will call you with the results.  Decrease indapamide to one tab alternating with 2 tabs every other day & see how you do.

## 2023-01-20 ENCOUNTER — PATIENT ROUNDING (BHMG ONLY) (OUTPATIENT)
Dept: ENDOCRINOLOGY | Facility: CLINIC | Age: 68
End: 2023-01-20
Payer: MEDICARE

## 2023-01-20 NOTE — PROGRESS NOTES
January 20, 2023    Hello, may I speak with Jonathan Rodriguez?    My name is Dennise     I am  with [facility]  [address] [place] [address].    Before we get started may I verify your date of birth? 1955    I am calling to officially welcome you to our practice and ask about your recent visit. Is this a good time to talk? yes    Tell me about your visit with us. What things went well? Everything was wonderful, Dr Roberts was very nice and made her feel very comfortable. The girl at the  was very nice.       We're always looking for ways to make our patients' experiences even better. Do you have recommendations on ways we may improve?  No    Overall were you satisfied with your first visit to our practice? Yes     I appreciate you taking the time to speak with me today. Is there anything else I can do for you? No      Thank you, and have a great day.

## 2023-01-21 DIAGNOSIS — E03.9 ACQUIRED HYPOTHYROIDISM: ICD-10-CM

## 2023-01-21 LAB
CALCIUM 24H UR-MCNC: 16.9 MG/DL
CALCIUM 24H UR-MRATE: 186 MG/24 HR (ref 0–320)
CREAT 24H UR-MRATE: 934 MG/24 HR (ref 800–1800)
CREAT UR-MCNC: 84.9 MG/DL

## 2023-01-23 RX ORDER — LEVOTHYROXINE SODIUM 112 UG/1
TABLET ORAL
Qty: 90 TABLET | Refills: 3 | Status: SHIPPED | OUTPATIENT
Start: 2023-01-23

## 2023-01-25 ENCOUNTER — TELEPHONE (OUTPATIENT)
Dept: ENDOCRINOLOGY | Facility: CLINIC | Age: 68
End: 2023-01-25
Payer: MEDICARE

## 2023-01-26 NOTE — TELEPHONE ENCOUNTER
Called & left msg for pt:  24h urine showed a good collectio ( cr 934mg/24h).  24h Calcium is 186 mg/24h, which is normal ( but not low).   Therefore, we will proceed with parathyroid scan.  You will be contacted to get it scheduled.

## 2023-01-27 NOTE — PROGRESS NOTES
[facility]    Referring Provider: Kerri Almanza, *  Reason for Consultation: Parathyroid evaluation & management.    Patient Care Team:  Kerri Almanza MD as PCP - General (Family Medicine)  Bernadette Israel as Technologist  Rocky Malloy MD as Surgeon (Orthopedic Surgery)    Chief complaint hyperparathyroidism (New Patient)      Subjective .     History of present illness:    This is a  67 y.o. female with hypercalcemia since .  Persisted after stopping all calcium supplements.  Denies personal or family history of kidney stones or fragility fractures.  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  Partial thyroidectomy in  @ [facility] for L thyroid nodule suspicious on FNA.  Turned out to be benign. On replacement.  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  Lost 20 lb in the last year, unintentionally.  Retired in , .  [id number]. Menopause @ age 48 ().    Review of Systems  Review of Systems   Constitutional: Positive for unexpected weight loss.   HENT: Negative for trouble swallowing.    Eyes: Negative for blurred vision.   Respiratory: Positive for shortness of breath.    Cardiovascular: Negative for leg swelling.   Gastrointestinal: Negative for constipation and nausea.   Endocrine: Negative for polyuria.   Genitourinary:        Nocturia   Neurological: Positive for tremors. Negative for headache.       History  Past Medical History:   Diagnosis Date   • Hyperlipidemia    • Hyperparathyroidism (HCC) 2002   • Hypertension    • Hypothyroidism    • MAMMO     NEG =    • Obesity    • Otosclerosis    • Pneumonia 2018   • Postmenopausal    • Thyroid nodule ?     Past Surgical History:   Procedure Laterality Date   • COLONOSCOPY      NEG = , rech    • INNER EAR SURGERY Bilateral    • THYROIDECTOMY, PARTIAL      Left thyroid mass= Benign   • TONSILLECTOMY     • TUBAL ABDOMINAL LIGATION       Family History   Problem Relation Age of Onset    • COPD Mother    • Hyperlipidemia Father    • Hypertension Father    • Heart disease Father    • Stroke Father    • Hypertension Brother    • Stroke Brother    • Diabetes Brother    • Heart disease Brother    • Lymphoma Brother    • Cancer Brother    • Hyperlipidemia Brother    • Colon cancer Paternal Grandmother      Social History     Tobacco Use   • Smoking status: Never     Passive exposure: Past   • Smokeless tobacco: Never   Vaping Use   • Vaping Use: Never used   Substance Use Topics   • Alcohol use: No   • Drug use: No         Allergies:  Patient has no known allergies.    Objective     Vital Signs       Vitals:    01/18/23 0917   BP: 124/72   Pulse: 73         Physical Exam:     General Appearance:    Alert, cooperative, in no acute distress   Head:    Normocephalic, without obvious abnormality, atraumatic   Eyes:            Lids and lashes normal, conjunctivae and sclerae normal, no   icterus, no pallor, corneas clear, PERRLA   Throat:   No oral lesions,  oral mucosa moist   Neck:   Scar from L thyroidectomy, no carotid bruit   Lungs:     Clear     Heart:    Regular rhythm and normal rate   Chest Wall:    No abnormalities observed   Abdomen:     Normal bowel sounds, soft                 Extremities:   Coarse hand tremors, no edema               Pulses:   Pulses palpable and equal bilaterally   Skin:   Dry   Neurologic:  DTR absent in ankles, vibratory sense 50% decreased in toes       Results Review  I have reviewed the patient's new clinical results, labs & imaging.    PTH 63, iCa 6.3; vit D 26.3 on 4/13/2022.    DEXA  scan on 9/1/2022: T score radius -1.8, spine 0.9. hip -1.9.    TSH 0.680, FreeT4 1.62; , Ca 11.0, iCa 6.3; cr 0.85, BUN 20, gl 91, K 2.7, Alb 4.4, ALT 32 on 11/8/2022.    Ca 10.7, K 3.5, Mg 1.9 on 1/9//2023.    Urine Ca 186 mg/24h; creatinine 934 mg/24h ( good collection ).      Assessment & Plan     1. Hyperparathyroidism    Continue exercise as able.  Drink plenty of water.  Will  schedule parathyroid scan.  Decrease indapamide to one tab alternating with 2 tabs every other day & see how you do.  Info on Hyperparathyroidism given to pt.    I discussed the patients findings and my recommendations with patient    Koko Roberts MD  01/27/23  14:07 EST

## 2023-01-30 DIAGNOSIS — E78.2 MIXED HYPERLIPIDEMIA: ICD-10-CM

## 2023-01-30 RX ORDER — ATORVASTATIN CALCIUM 20 MG/1
TABLET, FILM COATED ORAL
Qty: 90 TABLET | Refills: 3 | Status: SHIPPED | OUTPATIENT
Start: 2023-01-30

## 2023-02-02 ENCOUNTER — TRANSCRIBE ORDERS (OUTPATIENT)
Dept: ADMINISTRATIVE | Facility: HOSPITAL | Age: 68
End: 2023-02-02
Payer: MEDICARE

## 2023-02-09 ENCOUNTER — HOSPITAL ENCOUNTER (OUTPATIENT)
Dept: NUCLEAR MEDICINE | Facility: HOSPITAL | Age: 68
Discharge: HOME | End: 2023-02-09
Payer: MEDICARE

## 2023-02-09 DIAGNOSIS — E21.0 PRIMARY HYPERPARATHYROIDISM: ICD-10-CM

## 2023-02-09 PROCEDURE — 78072 PARATHYRD PLANAR W/SPECT&CT: CPT

## 2023-02-09 PROCEDURE — 0 TECHNETIUM SESTAMIBI: Performed by: INTERNAL MEDICINE

## 2023-02-09 PROCEDURE — A9500 TC99M SESTAMIBI: HCPCS | Performed by: INTERNAL MEDICINE

## 2023-02-09 RX ADMIN — TECHNETIUM TC 99M SESTAMIBI 1 DOSE: 1 INJECTION INTRAVENOUS at 10:53

## 2023-02-10 ENCOUNTER — TELEPHONE (OUTPATIENT)
Dept: FAMILY MEDICINE CLINIC | Facility: CLINIC | Age: 68
End: 2023-02-10
Payer: MEDICARE

## 2023-02-10 ENCOUNTER — TELEPHONE (OUTPATIENT)
Dept: ENDOCRINOLOGY | Facility: CLINIC | Age: 68
End: 2023-02-10
Payer: MEDICARE

## 2023-02-10 NOTE — TELEPHONE ENCOUNTER
PATIENT STATES DR. MARIN REFERRED HER TO DR. MUNOZ WHO DONE A CT SCAN AND OTHER THINGS ON HER THYROID. THE RESULTS ARE IN HER MYCHART SHE READ THEM BUT DOESN'T UNDERSTAND WHAT THEY MEAN, AND DR. MUNOZ IS OUT OF THE OFFICE FOR A LITTLE BIT. SHE IS REQUESTING DR. MARIN REVIEW THE RESULTS AND GIVE HER CALL ASA TO DISCUSS     PATIENT< [phone]

## 2023-02-10 NOTE — TELEPHONE ENCOUNTER
Patient called stating she saw the results of the Parathyroid Scan in her chart. She is asking if you have reviewed them and if you can tell her what it means. I advised it will be next week before you are back in office and she states that is perfectly fine.

## 2023-02-11 NOTE — TELEPHONE ENCOUNTER
Called & left msg for pt:  Parathyroid scan suspicious for R parathyroid adenoma.  Will refer to Dr. Dennis for surgery.

## 2023-02-13 DIAGNOSIS — E21.0 PRIMARY HYPERPARATHYROIDISM: Primary | ICD-10-CM

## 2023-03-02 ENCOUNTER — OFFICE VISIT (OUTPATIENT)
Dept: SURGERY | Facility: CLINIC | Age: 68
End: 2023-03-02
Payer: MEDICARE

## 2023-03-02 VITALS
TEMPERATURE: 98 F | WEIGHT: 169 LBS | DIASTOLIC BLOOD PRESSURE: 78 MMHG | SYSTOLIC BLOOD PRESSURE: 134 MMHG | BODY MASS INDEX: 31.1 KG/M2 | HEART RATE: 70 BPM | HEIGHT: 62 IN | OXYGEN SATURATION: 98 %

## 2023-03-02 DIAGNOSIS — E21.0 PRIMARY HYPERPARATHYROIDISM: Primary | ICD-10-CM

## 2023-03-02 PROCEDURE — 99204 OFFICE O/P NEW MOD 45 MIN: CPT | Performed by: SURGERY

## 2023-03-02 NOTE — PROGRESS NOTES
CHIEF COMPLAINT:    Elevated calcium on labs, primary hyperparathyroidism    HISTORY OF PRESENT ILLNESS:    Jonathan Rodriguez is a 67 y.o. female who is referred today for primary hyperparathyroidism.  The patient has a history of hypercalcemia on routine lab work for at least the last few years.  In April 2022 she had a PTH checked which was normal at 63.  In November 2022 she had a PTH of 110 with a calcium of 11.  As far as I can see her calcium has never exceeded 11.  Given these findings she was seen by endocrinology.  A SPECT scan was performed showing what appears to be a right parathyroid adenoma.  Of note, she is status post left hemithyroidectomy for benign disease in 2015.    In discussion today the patient has no history of renal stones or kidney dysfunction.  Her creatinine has been normal.  She had a bone density test within the last year that showed some osteopenic changes in her worst score was -1.9.  She notes no history of bony fractures.  She does have some osteoarthritis in her left knee.    She notes no mental status issues, no depression, no anxiety.    She has mild reflux for which she takes Pepcid.    Past Medical History:   Diagnosis Date   • Hyperlipidemia    • Hyperparathyroidism (HCC) November 2002   • Hypertension    • Hypothyroidism    • MAMMO     NEG = 2018   • Obesity    • Otosclerosis    • Pneumonia 12/17/2018   • Postmenopausal    • Thyroid nodule ?       Past Surgical History:   Procedure Laterality Date   • COLONOSCOPY      NEG = 2014, rech 2024   • INNER EAR SURGERY Bilateral    • THYROIDECTOMY, PARTIAL      Left thyroid mass= Benign   • TONSILLECTOMY     • TUBAL ABDOMINAL LIGATION         Prior to Admission medications    Medication Sig Start Date End Date Taking? Authorizing Provider   albuterol sulfate  (90 Base) MCG/ACT inhaler Inhale 2 puffs Every 4 (Four) Hours As Needed for Shortness of Air. 1/18/22  Yes Kerri Almanza MD   amLODIPine (NORVASC) 5 MG tablet Take  1 tablet by mouth once daily 1/16/23  Yes Kerri Almanza MD   aspirin 325 MG tablet Take 1 tablet by mouth Daily. 6/25/18  Yes ProviderKaitlin MD   atorvastatin (LIPITOR) 20 MG tablet TAKE 1 TABLET BY MOUTH ONCE DAILY AT NIGHT 1/30/23  Yes Kerri Almanza MD   budesonide-formoterol (SYMBICORT) 160-4.5 MCG/ACT inhaler Inhale 2 puffs 2 (Two) Times a Day.   Yes Aurelio Graves MD   famotidine (PEPCID) 40 MG tablet Take 1 tablet by mouth Daily. 10/25/22  Yes Aurelio Graves MD   indapamide (LOZOL) 2.5 MG tablet TAKE 2 TABLETS BY MOUTH IN THE MORNING 11/22/22  Yes Kerri Almanza MD   levothyroxine (SYNTHROID, LEVOTHROID) 112 MCG tablet Take 1 tablet by mouth once daily 1/23/23  Yes Kerri Almanza MD   potassium chloride (K-DUR,KLOR-CON) 20 MEQ CR tablet Take 2 tablets daily by mouth 12/7/22 3/2/23  Kerri Almanza MD       No Known Allergies    Family History   Problem Relation Age of Onset   • COPD Mother    • Hyperlipidemia Father    • Hypertension Father    • Heart disease Father    • Stroke Father    • Hypertension Brother    • Stroke Brother    • Diabetes Brother    • Heart disease Brother    • Lymphoma Brother    • Cancer Brother    • Hyperlipidemia Brother    • Colon cancer Paternal Grandmother        Social History     Socioeconomic History   • Marital status:      Spouse name: Wilfrido   • Number of children: 2   Tobacco Use   • Smoking status: Never     Passive exposure: Past   • Smokeless tobacco: Never   Vaping Use   • Vaping Use: Never used   Substance and Sexual Activity   • Alcohol use: No   • Drug use: No   • Sexual activity: Not Currently     Partners: Male     Birth control/protection: None, Tubal ligation       Review of Systems   Constitutional: Negative for appetite change.   Gastrointestinal: Negative for abdominal pain.   Musculoskeletal: Negative for arthralgias and myalgias.       Objective     /78 (BP Location: Right arm, Patient Position: Sitting,  "Cuff Size: Adult)   Pulse 70   Temp 98 °F (36.7 °C) (Infrared)   Ht 157.5 cm (62\")   Wt 76.7 kg (169 lb)   LMP  (LMP Unknown)   SpO2 98%   BMI 30.91 kg/m²     Physical Exam  Constitutional:       General: She is not in acute distress.     Appearance: Normal appearance. She is not ill-appearing, toxic-appearing or diaphoretic.   HENT:      Head: Normocephalic and atraumatic.   Eyes:      General: No scleral icterus.        Right eye: No discharge.         Left eye: No discharge.      Extraocular Movements: Extraocular movements intact.      Conjunctiva/sclera: Conjunctivae normal.   Neck:      Trachea: Phonation normal.     Pulmonary:      Effort: Pulmonary effort is normal. No respiratory distress.   Musculoskeletal:      Cervical back: Neck supple.   Lymphadenopathy:      Cervical: No cervical adenopathy.   Skin:     General: Skin is warm.      Coloration: Skin is not jaundiced.   Neurological:      General: No focal deficit present.      Mental Status: She is alert and oriented to person, place, and time.   Psychiatric:         Mood and Affect: Mood normal.         Behavior: Behavior normal.         Thought Content: Thought content normal.         Judgment: Judgment normal.         DIAGNOSTIC DATA:    SPECT-CT from 2/9/2023 was reviewed and the images were reviewed with the patient and her significant other.  This does appear to show a right parathyroid adenoma.    Maximal calcium was 11 on 11/8/2022 at which time her PTH was 110.  Her creatinine was 0.85 at that time.    Most recent calcium was 10.7    DEXA scan on 9/1/2022 showed osteopenia    ASSESSMENT:    Primary hyperparathyroidism with hypercalcemia, asymptomatic    PLAN:    I had a long discussion with the patient and her significant other today about hyperparathyroidism in general and indications for surgery specifically.    Currently, the patient appears to be asymptomatic from her hyperparathyroidism and hypercalcemia.  She has no renal " dysfunction, no history of kidney stones, no long bone pain or myalgias.  She has no GI symptoms other than mild reflux which is treated with Pepcid.  She has no evidence of osteoporosis.  She has no depressive or anxiety type symptoms.    As she appears to be asymptomatic from her hypercalcemia I then reviewed with she and her significant other indications for surgery in asymptomatic patients including calcium greater then 1 above upper limit of normal.  Her highest calcium is 11 which is 0.7 above the upper limit of normal.  Decreased renal function would be an indication for surgery for asymptomatic patients however kidney function is normal.  Her age is greater than 50 which also would have been an indication for surgery in an asymptomatic patient.    Given all of this I gave the patient the option of whether she would like to pursue surgery or not.  Currently as she has no symptoms and no hard indication for surgery she would like to defer surgery at this time.  Certainly she can come back to see us if anything changes.          This document has been electronically signed by Saw Smith MD on March 2, 2023 09:00 EST

## 2023-05-10 ENCOUNTER — TELEPHONE (OUTPATIENT)
Dept: FAMILY MEDICINE CLINIC | Facility: CLINIC | Age: 68
End: 2023-05-10
Payer: MEDICARE

## 2023-05-10 NOTE — TELEPHONE ENCOUNTER
Caller: YEIMY    Relationship: [facility]    Best call back number: [phone]    What was the call regarding: YEIMY FROM [facility] STATED THAT PATIENT JUST HAD LEFT KNEE SURGERY AND THE INCISION IS STILL BLEED AND LOOKS INFECTED.     YEIMY HAS CONTACT  OFFICE.     PLEASE CALL TO DISCUSS AND ADVISE IF NEEDED.

## 2023-05-10 NOTE — TELEPHONE ENCOUNTER
CALLED AND LET THEM KNOW THEY NEED TO CONTACT DR SALTER SINCE HE DID THE SURGERY AND IT DOES NOT GET BACK WITH THEM THAN SHE SHOULD GO TO THE ER. VOICED THEY UNDERSTOOD.

## 2023-05-16 ENCOUNTER — TELEPHONE (OUTPATIENT)
Dept: FAMILY MEDICINE CLINIC | Facility: CLINIC | Age: 68
End: 2023-05-16
Payer: MEDICARE

## 2023-05-16 NOTE — TELEPHONE ENCOUNTER
Caller: SID ALEMAN    Relationship to patient: Home Health    Best call back number: [phone]  Patient is needing: HOME HEALTH  OCCUPATIONAL THERAPY  IS INFORMING THE DOCTOR THAT PATIENT REFUSE HOME OCCUPATIONAL  THERAPY BUT WILL DO PHYSICAL THERAPY

## 2023-05-16 NOTE — TELEPHONE ENCOUNTER
Okay.  Usually after her knee replacement, physical therapy is the more commonly needed treatment.

## 2023-05-18 ENCOUNTER — TELEPHONE (OUTPATIENT)
Dept: FAMILY MEDICINE CLINIC | Facility: CLINIC | Age: 68
End: 2023-05-18
Payer: MEDICARE

## 2023-05-18 NOTE — TELEPHONE ENCOUNTER
Caller: AMBER    Best call back number: [phone]    What orders are you requesting (i.e. lab or imaging): HEALTH PLAN OF CARE FORM ORDER # [id number] WAS FAXED ON 5/16/20203    REQUESTING FILLED OUT AND FAXED BACK. PLEASE CALL IF NOT RECIEVED

## 2023-05-18 NOTE — TELEPHONE ENCOUNTER
Called and let them know I did not receive this order and asked to have refax the order and I would give it to dr ford to take care of

## 2023-05-30 ENCOUNTER — OUTSIDE FACILITY SERVICE (OUTPATIENT)
Dept: FAMILY MEDICINE CLINIC | Facility: CLINIC | Age: 68
End: 2023-05-30

## 2023-08-10 ENCOUNTER — HOSPITAL ENCOUNTER (OUTPATIENT)
Dept: PAIN MEDICINE | Facility: HOSPITAL | Age: 68
Discharge: HOME | End: 2023-08-10
Payer: MEDICARE

## 2023-08-10 VITALS
TEMPERATURE: 97.1 F | HEART RATE: 70 BPM | DIASTOLIC BLOOD PRESSURE: 77 MMHG | HEIGHT: 62 IN | WEIGHT: 157 LBS | SYSTOLIC BLOOD PRESSURE: 118 MMHG | RESPIRATION RATE: 16 BRPM | OXYGEN SATURATION: 97 % | BODY MASS INDEX: 28.89 KG/M2

## 2023-08-10 DIAGNOSIS — M53.3 SACROILIAC JOINT DYSFUNCTION: Primary | ICD-10-CM

## 2023-08-10 DIAGNOSIS — R52 PAIN: ICD-10-CM

## 2023-08-10 PROCEDURE — 25010000002 METHYLPREDNISOLONE PER 40 MG: Performed by: STUDENT IN AN ORGANIZED HEALTH CARE EDUCATION/TRAINING PROGRAM

## 2023-08-10 PROCEDURE — 77003 FLUOROGUIDE FOR SPINE INJECT: CPT

## 2023-08-10 PROCEDURE — 25010000002 BUPIVACAINE (PF) 0.5 % SOLUTION: Performed by: STUDENT IN AN ORGANIZED HEALTH CARE EDUCATION/TRAINING PROGRAM

## 2023-08-10 RX ORDER — BUPIVACAINE HYDROCHLORIDE 5 MG/ML
10 INJECTION, SOLUTION EPIDURAL; INTRACAUDAL ONCE
Status: COMPLETED | OUTPATIENT
Start: 2023-08-10 | End: 2023-08-10

## 2023-08-10 RX ORDER — METHYLPREDNISOLONE ACETATE 40 MG/ML
40 INJECTION, SUSPENSION INTRA-ARTICULAR; INTRALESIONAL; INTRAMUSCULAR; SOFT TISSUE ONCE
Status: COMPLETED | OUTPATIENT
Start: 2023-08-10 | End: 2023-08-10

## 2023-08-10 RX ADMIN — METHYLPREDNISOLONE ACETATE 40 MG: 40 INJECTION, SUSPENSION INTRA-ARTICULAR; INTRALESIONAL; INTRAMUSCULAR; INTRASYNOVIAL; SOFT TISSUE at 08:40

## 2023-08-10 RX ADMIN — BUPIVACAINE HYDROCHLORIDE 5 ML: 5 INJECTION, SOLUTION EPIDURAL; INTRACAUDAL; PERINEURAL at 08:40

## 2023-08-10 NOTE — PROCEDURES
PREOPERATIVE DIAGNOSIS:    Left SI Joint Arthropathy      POSTOPERATIVE DIAGNOSIS:  Same.     PROCEDURE: Left sacroiliac joint steroid injection     PROCEDURE IN DETAIL:  The patient was placed in a prone position and the lower back was prepped with chloraprep and draped in the usual sterile fashion.  The skin overlaying the left SI joint was infiltrated with 1% lidocaine for local anesthesia.  A 22-gauge 3.5 inch spinal needle was inserted through the skin under fluoroscopic guidance until we got to the lower third of the SI joint. Another needle was placed in annetta upper third of the joint. 2 cc of 0.25% marcaine with depo-medrol was injected at each level. A total of 4 cc of 0.25% marcaine with 40 mg of depo-medrol was injected.     After the procedure the needles were flushed with preservative free local anesthetic and removed. Skin was cleaned and a sterile dressing was applied.    Following the procedure the patient's vital signs were stable. The patient was discharged home in good condition after being given discharge instructions.    COMPLICATIONS: None    Neo Bucio DO  Pain Management   [facility]

## 2023-08-10 NOTE — H&P
H and P reviewed from previous visit and no changes to patient's clinical presentation. Will proceed with procedure as planned. Patient denies history of DM and being on blood thinners.    Neo Bucio DO  Pain Management   [facility]

## 2023-08-11 ENCOUNTER — TELEPHONE (OUTPATIENT)
Dept: PAIN MEDICINE | Facility: HOSPITAL | Age: 68
End: 2023-08-11
Payer: MEDICARE

## 2023-08-28 NOTE — PROGRESS NOTES
Subjective   Jonathan Rodriguez is a 68 y.o. female is here for follow up for lower back pain. Patient was last seen for Left SI joint injection.     On last visit:     After injection:   LBP is 0/10 on VAS.    Before injection:   Lower back (left side) pain is 2/10 on VAS, at maximum is 4/10. Pain is sharp, shooting in nature. Pain is referred left buttock, left hip. The pain is intermittent. The pain is improved by stretching. The pain is worse with sitting for loing time, riding - 45 minutes    Previous Injection:   8/10/2023-left SI joint injection- 100% pain relief. Able to ride her  without significant issues.     Hx: Referred by Dr. Almanza, Kerri Lopez MD for lower back pain. Long history of chronic lower back pain which has significantly worsened recently.  Patient had steroid injection in the lumbar spine with excellent relief in 1997 previously and interested in obtaining one. She had left knee replaceement in 5/23 and she is doing really well.  Recent symptoms started about 1 month ago after riding law mower long period of time.            SOAPP- 0   [place] back disability scale -      PMH:   Hypertension, migraines, hyper parathyroidism s/p partial thyroidectomy, history of shingles, COPD, s/p left knee replacement-05/2023, history of bilateral Synvisc injections by Dr. Malloy, osteopenia      Current Medications:   Aspirin 325 mg  Ibuprofen        Past Medications:     Past Modalities:  TENS:                                                                          yes                                                 Physical Therapy Within The Last 6 Months              Yes - PT [facility] - 5/23 to 7/23. Currently doing home exercises  Psychotherapy                                                            no  Massage Therapy                                                       no     Patient Complains Of:  Uro-Fecal Incontinence          no  Weight Gain/Loss                    no  Fever/Chills                             no  Weakness                               no        PEG Assessment   What number best describes your pain on average in the past week?4  What number best describes how, during the past week, pain has interfered with your enjoyment of life?4  What number best describes how, during the past week, pain has interfered with your general activity?  4         Current Outpatient Medications:     albuterol sulfate  (90 Base) MCG/ACT inhaler, Inhale 2 puffs Every 4 (Four) Hours As Needed for Shortness of Air., Disp: 18 g, Rfl: 5    amLODIPine (NORVASC) 5 MG tablet, Take 1 tablet by mouth once daily, Disp: 90 tablet, Rfl: 2    aspirin 325 MG tablet, Take 1 tablet by mouth Daily., Disp: , Rfl:     atorvastatin (LIPITOR) 20 MG tablet, TAKE 1 TABLET BY MOUTH ONCE DAILY AT NIGHT, Disp: 90 tablet, Rfl: 3    budesonide-formoterol (SYMBICORT) 160-4.5 MCG/ACT inhaler, Inhale 2 puffs Daily., Disp: , Rfl:     famotidine (PEPCID) 40 MG tablet, Take 1 tablet by mouth Daily., Disp: , Rfl:     indapamide (LOZOL) 2.5 MG tablet, TAKE 2 TABLETS BY MOUTH IN THE MORNING, Disp: 180 tablet, Rfl: 3    levothyroxine (SYNTHROID, LEVOTHROID) 112 MCG tablet, Take 1 tablet by mouth once daily, Disp: 90 tablet, Rfl: 3    The following portions of the patient's history were reviewed and updated as appropriate: allergies, current medications, past family history, past medical history, past social history, past surgical history, and problem list.      REVIEW OF PERTINENT MEDICAL DATA    Past Medical History:   Diagnosis Date    Asthma 05 2020    Copd    Back pain     COPD (chronic obstructive pulmonary disease) 05 2020    Hyperlipidemia     Hyperparathyroidism November 2002    Hypertension     Hypothyroidism     MAMMO     NEG = 2018    Obesity     Otosclerosis     Pneumonia 12/17/2018    Postmenopausal     Thyroid nodule ?     Past Surgical History:   Procedure Laterality Date    COLONOSCOPY      NEG =  2014, rech     INNER EAR SURGERY Bilateral     JOINT REPLACEMENT  23    THYROIDECTOMY, PARTIAL      Left thyroid mass= Benign    TONSILLECTOMY      TUBAL ABDOMINAL LIGATION       Family History   Problem Relation Age of Onset    COPD Mother             Hyperlipidemia Father     Hypertension Father     Heart disease Father     Stroke Father     Hypertension Brother     Stroke Brother     Diabetes Brother     Heart disease Brother     Lymphoma Brother     Cancer Brother     Hyperlipidemia Brother     Colon cancer Paternal Grandmother      Social History     Socioeconomic History    Marital status:      Spouse name: Wilfrido    Number of children: 2   Tobacco Use    Smoking status: Never     Passive exposure: Past    Smokeless tobacco: Never   Vaping Use    Vaping Use: Never used   Substance and Sexual Activity    Alcohol use: No    Drug use: No    Sexual activity: Not Currently     Partners: Male     Birth control/protection: None, Tubal ligation         Review of Systems   Musculoskeletal:  Positive for arthralgias and back pain.       Vitals:    23 0901   BP: 124/75   Pulse: 61   Resp: 16   SpO2: 99%   PainSc: 0-No pain         Objective   Physical Exam  Musculoskeletal:         General: Tenderness present.        Legs:          Imaging Reviewed:  Left knee x-ray-2023  - Left knee arthroplasty seen with hardware in place.     Lumbar x-ray-2023  - Levoscoliosis of the lumbar spine with apex at L2-3  - Compression deformity at L4-5-age-indeterminate  - Multilevel degenerative disc disease throughout lumbar spine    Assessment:    1. Sacroiliac joint dysfunction         Plan:   1. Defer UDS for now.   2. We discussed trying a course of formal physical therapy.  Physical therapy can help strengthen and stretch the muscles around the joints. Continue to be as active as possible.Given home exercise program to be done BID for 6 weeks.   3. Excellent relief with SI joint injection, will  repeat PRN.  4. Also has left trochanteric bursitis, may consider injection in future if pain worsens.      RTC PRN     Neo Bucio DO  Pain Management   [facility]             INSPECT REPORT    As part of the patient's treatment plan, I may be prescribing controlled substances. The patient has been made aware of appropriate use of such medications, including potential risk of somnolence, limited ability to drive and/or work safely, and the potential for dependence or overdose. It has also been made clear that these medications are for use by this patient only, without concomitant use of alcohol or other substances unless prescribed.     Patient has completed prescribing agreement detailing terms of continued prescribing of controlled substances, including monitoring INSPECT reports, urine drug screening, and pill counts if necessary. The patient is aware that inappropriate use will results in cessation of prescribing such medications.    INSPECT report has been reviewed and scanned into the patient's chart.

## 2023-08-29 ENCOUNTER — TRANSCRIBE ORDERS (OUTPATIENT)
Dept: ADMINISTRATIVE | Facility: HOSPITAL | Age: 68
End: 2023-08-29
Payer: MEDICARE

## 2023-08-29 ENCOUNTER — OFFICE VISIT (OUTPATIENT)
Dept: PAIN MEDICINE | Facility: CLINIC | Age: 68
End: 2023-08-29
Payer: MEDICARE

## 2023-08-29 VITALS
DIASTOLIC BLOOD PRESSURE: 75 MMHG | SYSTOLIC BLOOD PRESSURE: 124 MMHG | HEART RATE: 61 BPM | OXYGEN SATURATION: 99 % | RESPIRATION RATE: 16 BRPM

## 2023-08-29 DIAGNOSIS — Z12.31 ENCOUNTER FOR SCREENING MAMMOGRAM FOR MALIGNANT NEOPLASM OF BREAST: Primary | ICD-10-CM

## 2023-08-29 DIAGNOSIS — M53.3 SACROILIAC JOINT DYSFUNCTION: Primary | ICD-10-CM

## 2023-09-05 ENCOUNTER — HOSPITAL ENCOUNTER (OUTPATIENT)
Dept: MAMMOGRAPHY | Facility: HOSPITAL | Age: 68
Discharge: HOME | End: 2023-09-05
Admitting: FAMILY MEDICINE
Payer: MEDICARE

## 2023-09-05 DIAGNOSIS — Z12.31 ENCOUNTER FOR SCREENING MAMMOGRAM FOR MALIGNANT NEOPLASM OF BREAST: ICD-10-CM

## 2023-09-05 PROCEDURE — 77063 BREAST TOMOSYNTHESIS BI: CPT

## 2023-09-05 PROCEDURE — 77067 SCR MAMMO BI INCL CAD: CPT

## 2023-10-21 DIAGNOSIS — I10 ESSENTIAL HYPERTENSION: ICD-10-CM

## 2023-10-23 RX ORDER — AMLODIPINE BESYLATE 5 MG/1
TABLET ORAL
Qty: 90 TABLET | Refills: 3 | Status: SHIPPED | OUTPATIENT
Start: 2023-10-23

## 2023-11-09 ENCOUNTER — FLU SHOT (OUTPATIENT)
Dept: FAMILY MEDICINE CLINIC | Facility: CLINIC | Age: 68
End: 2023-11-09
Payer: MEDICARE

## 2023-11-09 DIAGNOSIS — Z23 NEED FOR INFLUENZA VACCINATION: Primary | ICD-10-CM

## 2023-11-26 DIAGNOSIS — J45.40 MODERATE PERSISTENT REACTIVE AIRWAY DISEASE WITHOUT COMPLICATION: ICD-10-CM

## 2023-11-27 RX ORDER — ALBUTEROL SULFATE 90 UG/1
2 AEROSOL, METERED RESPIRATORY (INHALATION) EVERY 4 HOURS PRN
Qty: 18 G | Refills: 2 | Status: SHIPPED | OUTPATIENT
Start: 2023-11-27

## 2024-01-08 ENCOUNTER — OFFICE VISIT (OUTPATIENT)
Dept: FAMILY MEDICINE CLINIC | Facility: CLINIC | Age: 69
End: 2024-01-08
Payer: MEDICARE

## 2024-01-08 VITALS
WEIGHT: 169 LBS | RESPIRATION RATE: 18 BRPM | SYSTOLIC BLOOD PRESSURE: 139 MMHG | TEMPERATURE: 97.3 F | HEIGHT: 62 IN | OXYGEN SATURATION: 96 % | HEART RATE: 70 BPM | DIASTOLIC BLOOD PRESSURE: 80 MMHG | BODY MASS INDEX: 31.1 KG/M2

## 2024-01-08 DIAGNOSIS — J45.40 MODERATE PERSISTENT REACTIVE AIRWAY DISEASE WITHOUT COMPLICATION: ICD-10-CM

## 2024-01-08 DIAGNOSIS — E78.2 MIXED HYPERLIPIDEMIA: ICD-10-CM

## 2024-01-08 DIAGNOSIS — Z12.11 COLON CANCER SCREENING: ICD-10-CM

## 2024-01-08 DIAGNOSIS — E21.0 PRIMARY HYPERPARATHYROIDISM: Primary | ICD-10-CM

## 2024-01-08 DIAGNOSIS — K21.9 CHRONIC GERD: ICD-10-CM

## 2024-01-08 DIAGNOSIS — L82.1 SEBORRHEIC KERATOSIS: ICD-10-CM

## 2024-01-08 DIAGNOSIS — I51.7 LVH (LEFT VENTRICULAR HYPERTROPHY): ICD-10-CM

## 2024-01-08 DIAGNOSIS — E03.9 ACQUIRED HYPOTHYROIDISM: ICD-10-CM

## 2024-01-08 DIAGNOSIS — M17.0 PRIMARY OSTEOARTHRITIS OF BOTH KNEES: ICD-10-CM

## 2024-01-08 DIAGNOSIS — I10 ESSENTIAL HYPERTENSION: ICD-10-CM

## 2024-01-08 DIAGNOSIS — E01.0 THYROMEGALY: ICD-10-CM

## 2024-01-08 PROCEDURE — 3079F DIAST BP 80-89 MM HG: CPT | Performed by: FAMILY MEDICINE

## 2024-01-08 PROCEDURE — 99214 OFFICE O/P EST MOD 30 MIN: CPT | Performed by: FAMILY MEDICINE

## 2024-01-08 PROCEDURE — 1160F RVW MEDS BY RX/DR IN RCRD: CPT | Performed by: FAMILY MEDICINE

## 2024-01-08 PROCEDURE — 1159F MED LIST DOCD IN RCRD: CPT | Performed by: FAMILY MEDICINE

## 2024-01-08 PROCEDURE — 3075F SYST BP GE 130 - 139MM HG: CPT | Performed by: FAMILY MEDICINE

## 2024-01-08 RX ORDER — OMEPRAZOLE 40 MG/1
40 CAPSULE, DELAYED RELEASE ORAL DAILY
Qty: 90 CAPSULE | Refills: 3 | Status: SHIPPED | OUTPATIENT
Start: 2024-01-08

## 2024-01-08 NOTE — PROGRESS NOTES
Answers submitted by the patient for this visit:  Other (Submitted on 1/1/2024)  Please describe your symptoms.: Routine visit  Have you had these symptoms before?: No  How long have you been having these symptoms?: 1-4 days  Please list any medications you are currently taking for this condition.: As prescribed  Please describe any probable cause for these symptoms. : None  Primary Reason for Visit (Submitted on 1/1/2024)  What is the primary reason for your visit?: Other  Subjective   Jonathan Rodriguez is a 68 y.o. female.   Chief Complaint   Patient presents with    Hypertension    Hypothyroidism    Hyperlipidemia       History of Present Illness   Presents to the office today to follow-up on chronic medical problems per active problem list as below.  Last lab work was done just about 7 months ago.  We checked her thyroid function studies, calcium levels, CBC, CMP, lipid panel at that time.    Hyperparathyroidism -she did see Dr. Smith.  He felt that since her calcium was only 11, it did not really warrant exploratory surgery at this point.  He recommended following those levels.    Lipid panel was excellent.  Tolerating Lipitor 20 mg/day.    Hypothyroidism-on 112 mcg of levothyroxine.    HTN-blood pressure is excellent at 139/80.  Tolerating amlodipine and indapamide without problems.  She has some LVH on echocardiogram.    GERD-remains asymptomatic on daily PPI.    She has some reactive airway disease and is doing okay on generic Symbicort.  She ultimately did see pulmonology for their opinion.  Still gets SOA with exertion, but does continue to take the Symbicort.  Does not have any shortness of breath, wheezing at rest.    Preventive care-last mammogram was 9/5/2023.  She had a colonoscopy 10 years ago in 2014 that was normal.was recommended to repeat the colonoscopy in 10 years.    Joint pains-due to arthritis of the knees and SI joint dysfunction.  Most recently is seeing Dr. Bucio.    New complaint today  is that of a mole on her back.    She has no other complaints today.    Patient Active Problem List    Diagnosis Date Noted    Primary hyperparathyroidism 01/18/2023    Primary osteoarthritis of both knees 11/08/2022    Cough 01/03/2022    Acute nonintractable headache 01/03/2022    SOB (shortness of breath) 01/03/2022    Chronic GERD 10/11/2021    Reactive airway disease without complication 10/05/2020    Arthritis of right knee 10/04/2020    LVH (left ventricular hypertrophy) 06/01/2020    Acquired hypothyroidism 11/25/2019    Otosclerosis of both ears 11/25/2019     Note Last Updated: 11/25/2019     Replaced bones in ears with metal pistons - NO MRI's      MRI contraindicated due to metal implant 11/25/2019     Note Last Updated: 11/25/2019     Due to metal implants in inner ear      Mixed hyperlipidemia 08/15/2019    Allergic rhinitis 12/26/2018    Herpes zoster 12/17/2018    Loss of height 06/25/2018    Encounter for general adult medical examination without abnormal findings 06/21/2017    Visit for screening mammogram 06/21/2017    Obesity with body mass index 30 or greater 06/21/2016    Hemorrhoids, external 06/11/2012    Obesity 06/11/2012    Thyromegaly 06/11/2012    Vaginitis, atrophic 06/11/2012    Decreased white blood cell count 06/04/2012    Essential hypertension 06/04/2012           Past Surgical History:   Procedure Laterality Date    COLONOSCOPY      NEG = 2014, rech 2024    INNER EAR SURGERY Bilateral     JOINT REPLACEMENT  5-2-23    THYROIDECTOMY, PARTIAL      Left thyroid mass= Benign    TONSILLECTOMY      TUBAL ABDOMINAL LIGATION       Current Outpatient Medications on File Prior to Visit   Medication Sig    albuterol sulfate  (90 Base) MCG/ACT inhaler INHALE 2 PUFFS BY MOUTH EVERY 4 HOURS AS NEEDED FOR SHORTNESS OF BREATH    amLODIPine (NORVASC) 5 MG tablet Take 1 tablet by mouth once daily    aspirin 325 MG tablet Take 1 tablet by mouth Daily.    atorvastatin (LIPITOR) 20 MG tablet  "TAKE 1 TABLET BY MOUTH ONCE DAILY AT NIGHT    budesonide-formoterol (SYMBICORT) 160-4.5 MCG/ACT inhaler Inhale 2 puffs Daily.    indapamide (LOZOL) 2.5 MG tablet TAKE 2 TABLETS BY MOUTH IN THE MORNING    levothyroxine (SYNTHROID, LEVOTHROID) 112 MCG tablet Take 1 tablet by mouth once daily     No current facility-administered medications on file prior to visit.     No Known Allergies  Social History     Socioeconomic History    Marital status:      Spouse name: Wilfrido    Number of children: 2   Tobacco Use    Smoking status: Never     Passive exposure: Past    Smokeless tobacco: Never   Vaping Use    Vaping Use: Never used   Substance and Sexual Activity    Alcohol use: No    Drug use: No    Sexual activity: Not Currently     Partners: Male     Birth control/protection: None, Tubal ligation     Family History   Problem Relation Age of Onset    COPD Mother             Hyperlipidemia Father     Hypertension Father     Heart disease Father     Stroke Father     Hypertension Brother     Stroke Brother     Diabetes Brother     Heart disease Brother     Lymphoma Brother     Cancer Brother     Hyperlipidemia Brother     Colon cancer Paternal Grandmother        Review of Systems    Objective   /80 (BP Location: Left arm, Patient Position: Sitting, Cuff Size: Adult)   Pulse 70   Temp 97.3 °F (36.3 °C) (Infrared)   Resp 18   Ht 157.5 cm (62\")   Wt 76.7 kg (169 lb)   LMP  (LMP Unknown)   SpO2 96%   Breastfeeding No   BMI 30.91 kg/m²   Physical Exam  Constitutional:       General: She is not in acute distress.     Appearance: Normal appearance. She is well-developed. She is not ill-appearing.      Comments:      HENT:      Head: Normocephalic and atraumatic.   Eyes:      Conjunctiva/sclera: Conjunctivae normal.   Cardiovascular:      Rate and Rhythm: Normal rate and regular rhythm.      Heart sounds: No murmur heard.  Pulmonary:      Effort: Pulmonary effort is normal. No respiratory distress.     "  Breath sounds: No wheezing, rhonchi or rales.   Musculoskeletal:         General: Normal range of motion.      Cervical back: Normal range of motion.      Right lower leg: No edema.      Left lower leg: No edema.      Comments: Scar over anterior left knee from recent total knee replacement.  Range of motion looks good.  Minimal distal swelling.   Skin:     General: Skin is warm and dry.      Findings: No rash.      Comments: She has a small brown, lightly fissured, slightly raised macule on her back consistent with a seborrheic keratosis.   Neurological:      Mental Status: She is alert and oriented to person, place, and time.      Gait: Gait normal.   Psychiatric:         Mood and Affect: Mood normal.         Behavior: Behavior normal.           No visits with results within 4 Month(s) from this visit.   Latest known visit with results is:   Office Visit on 06/09/2023   Component Date Value Ref Range Status    TSH 06/09/2023 2.950  0.450 - 4.500 uIU/mL Final    Free T4 06/09/2023 1.68  0.82 - 1.77 ng/dL Final    T3, Free 06/09/2023 3.2  2.0 - 4.4 pg/mL Final    PTH, Intact 06/09/2023 85 (H)  15 - 65 pg/mL Final    Ionized Calcium 06/09/2023 5.7 (H)  4.5 - 5.6 mg/dL Final    Glucose 06/09/2023 85  70 - 99 mg/dL Final    BUN 06/09/2023 17  8 - 27 mg/dL Final    Creatinine 06/09/2023 0.66  0.57 - 1.00 mg/dL Final    EGFR Result 06/09/2023 95  >59 mL/min/1.73 Final    BUN/Creatinine Ratio 06/09/2023 26  12 - 28 Final    Sodium 06/09/2023 141  134 - 144 mmol/L Final    Potassium 06/09/2023 3.5  3.5 - 5.2 mmol/L Final    Chloride 06/09/2023 102  96 - 106 mmol/L Final    Total CO2 06/09/2023 24  20 - 29 mmol/L Final    Calcium 06/09/2023 10.7 (H)  8.7 - 10.3 mg/dL Final    Total Protein 06/09/2023 6.4  6.0 - 8.5 g/dL Final    Albumin 06/09/2023 4.1  3.8 - 4.8 g/dL Final    Globulin 06/09/2023 2.3  1.5 - 4.5 g/dL Final    A/G Ratio 06/09/2023 1.8  1.2 - 2.2 Final    Total Bilirubin 06/09/2023 0.3  0.0 - 1.2 mg/dL Final     Alkaline Phosphatase 06/09/2023 194 (H)  44 - 121 IU/L Final    AST (SGOT) 06/09/2023 15  0 - 40 IU/L Final    ALT (SGPT) 06/09/2023 18  0 - 32 IU/L Final    WBC 06/09/2023 6.2  3.4 - 10.8 x10E3/uL Final    RBC 06/09/2023 4.40  3.77 - 5.28 x10E6/uL Final    Hemoglobin 06/09/2023 12.7  11.1 - 15.9 g/dL Final    Hematocrit 06/09/2023 38.7  34.0 - 46.6 % Final    MCV 06/09/2023 88  79 - 97 fL Final    MCH 06/09/2023 28.9  26.6 - 33.0 pg Final    MCHC 06/09/2023 32.8  31.5 - 35.7 g/dL Final    RDW 06/09/2023 12.5  11.7 - 15.4 % Final    Platelets 06/09/2023 284  150 - 450 x10E3/uL Final    Neutrophil Rel % 06/09/2023 67  Not Estab. % Final    Lymphocyte Rel % 06/09/2023 21  Not Estab. % Final    Monocyte Rel % 06/09/2023 9  Not Estab. % Final    Eosinophil Rel % 06/09/2023 2  Not Estab. % Final    Basophil Rel % 06/09/2023 1  Not Estab. % Final    Neutrophils Absolute 06/09/2023 4.2  1.4 - 7.0 x10E3/uL Final    Lymphocytes Absolute 06/09/2023 1.3  0.7 - 3.1 x10E3/uL Final    Monocytes Absolute 06/09/2023 0.5  0.1 - 0.9 x10E3/uL Final    Eosinophils Absolute 06/09/2023 0.1  0.0 - 0.4 x10E3/uL Final    Basophils Absolute 06/09/2023 0.1  0.0 - 0.2 x10E3/uL Final    Immature Granulocyte Rel % 06/09/2023 0  Not Estab. % Final    Immature Grans Absolute 06/09/2023 0.0  0.0 - 0.1 x10E3/uL Final    Total Cholesterol 06/09/2023 164  100 - 199 mg/dL Final    Triglycerides 06/09/2023 129  0 - 149 mg/dL Final    HDL Cholesterol 06/09/2023 52  >39 mg/dL Final    VLDL Cholesterol Michael 06/09/2023 23  5 - 40 mg/dL Final    LDL Chol Calc ([facility]) 06/09/2023 89  0 - 99 mg/dL Final            Assessment & Plan   Diagnoses and all orders for this visit:    1. Primary hyperparathyroidism (Primary)  -     Calcium, Ionized  -     PTH, Intact    2. Thyromegaly    3. Acquired hypothyroidism  -     TSH  -     T4, Free  -     T3, Free    4. Essential hypertension  -     CBC & Differential  -     Comprehensive Metabolic Panel    5. Mixed  hyperlipidemia    6. LVH (left ventricular hypertrophy)    7. Primary osteoarthritis of both knees    8. Moderate persistent reactive airway disease without complication    9. Seborrheic keratosis    10. Chronic GERD  -     omeprazole (priLOSEC) 40 MG capsule; Take 1 capsule by mouth Daily.  Dispense: 90 capsule; Refill: 3    11. Colon cancer screening  -     Ambulatory Referral to Gastroenterology    Status of multiple chronic medical problems reviewed today as above.  Will recheck labs to follow her calcium and PTH levels.  Will repeat thyroid function studies to monitor status of her hypothyroidism.  For now continue 112 mcg of levothyroxine per day.  Blood pressure control is good at 139/80.  Continue indapamide and amlodipine at current doses.  Continue Lipitor 20 mg/day.  Follow lipid panel at least once a year.  Arthritic changes in her knees are stable.  Reassured about the presence of the seborrheic keratosis on her back.  Continue albuterol as a rescue inhaler and generic Symbicort as a maintenance inhaler for her reactive airway disease.  Referral back to gastroenterology for repeat colonoscopy.  Will be due for another mammogram in September of this year.  I will follow-up with her when the blood tests are back and we will see her again in 6 months or sooner if needed.        Call with any problems or concerns before next visit       Return in about 6 months (around 7/8/2024).      Much of this report is an electronic transcription of spoken language to printed text using Dragon dictation software.  As such, the subtleties and finesse of spoken language may permit erroneous, or at times, nonsensical words or phrases to be inadvertently transcribed; thus changes may be made at a later date to rectify these errors.     Kerri Almanza MD1/9/202420:52 EST  This note has been electronically signed

## 2024-01-09 LAB
ALBUMIN SERPL-MCNC: 4.1 G/DL (ref 3.9–4.9)
ALBUMIN/GLOB SERPL: 1.8 {RATIO} (ref 1.2–2.2)
ALP SERPL-CCNC: 167 IU/L (ref 44–121)
ALT SERPL-CCNC: 20 IU/L (ref 0–32)
AST SERPL-CCNC: 23 IU/L (ref 0–40)
BASOPHILS # BLD AUTO: 0.1 X10E3/UL (ref 0–0.2)
BASOPHILS NFR BLD AUTO: 2 %
BILIRUB SERPL-MCNC: 0.4 MG/DL (ref 0–1.2)
BUN SERPL-MCNC: 17 MG/DL (ref 8–27)
BUN/CREAT SERPL: 24 (ref 12–28)
CA-I SERPL ISE-MCNC: 5.7 MG/DL (ref 4.5–5.6)
CALCIUM SERPL-MCNC: 11 MG/DL (ref 8.7–10.3)
CHLORIDE SERPL-SCNC: 104 MMOL/L (ref 96–106)
CO2 SERPL-SCNC: 26 MMOL/L (ref 20–29)
CREAT SERPL-MCNC: 0.71 MG/DL (ref 0.57–1)
EGFRCR SERPLBLD CKD-EPI 2021: 91 ML/MIN/1.73
EOSINOPHIL # BLD AUTO: 0.4 X10E3/UL (ref 0–0.4)
EOSINOPHIL NFR BLD AUTO: 7 %
ERYTHROCYTE [DISTWIDTH] IN BLOOD BY AUTOMATED COUNT: 12.2 % (ref 11.7–15.4)
GLOBULIN SER CALC-MCNC: 2.3 G/DL (ref 1.5–4.5)
GLUCOSE SERPL-MCNC: 87 MG/DL (ref 70–99)
HCT VFR BLD AUTO: 42.9 % (ref 34–46.6)
HGB BLD-MCNC: 14.5 G/DL (ref 11.1–15.9)
IMM GRANULOCYTES # BLD AUTO: 0 X10E3/UL (ref 0–0.1)
IMM GRANULOCYTES NFR BLD AUTO: 0 %
LYMPHOCYTES # BLD AUTO: 1.3 X10E3/UL (ref 0.7–3.1)
LYMPHOCYTES NFR BLD AUTO: 24 %
MCH RBC QN AUTO: 29.5 PG (ref 26.6–33)
MCHC RBC AUTO-ENTMCNC: 33.8 G/DL (ref 31.5–35.7)
MCV RBC AUTO: 87 FL (ref 79–97)
MONOCYTES # BLD AUTO: 0.5 X10E3/UL (ref 0.1–0.9)
MONOCYTES NFR BLD AUTO: 10 %
NEUTROPHILS # BLD AUTO: 3.1 X10E3/UL (ref 1.4–7)
NEUTROPHILS NFR BLD AUTO: 57 %
PLATELET # BLD AUTO: 276 X10E3/UL (ref 150–450)
POTASSIUM SERPL-SCNC: 3.7 MMOL/L (ref 3.5–5.2)
PROT SERPL-MCNC: 6.4 G/DL (ref 6–8.5)
PTH-INTACT SERPL-MCNC: 91 PG/ML (ref 15–65)
RBC # BLD AUTO: 4.92 X10E6/UL (ref 3.77–5.28)
SODIUM SERPL-SCNC: 145 MMOL/L (ref 134–144)
T3FREE SERPL-MCNC: 3 PG/ML (ref 2–4.4)
T4 FREE SERPL-MCNC: 1.55 NG/DL (ref 0.82–1.77)
TSH SERPL DL<=0.005 MIU/L-ACNC: 0.44 UIU/ML (ref 0.45–4.5)
WBC # BLD AUTO: 5.3 X10E3/UL (ref 3.4–10.8)

## 2024-02-06 DIAGNOSIS — E78.2 MIXED HYPERLIPIDEMIA: ICD-10-CM

## 2024-02-06 DIAGNOSIS — E03.9 ACQUIRED HYPOTHYROIDISM: ICD-10-CM

## 2024-02-06 RX ORDER — LEVOTHYROXINE SODIUM 112 UG/1
TABLET ORAL
Qty: 90 TABLET | Refills: 1 | Status: SHIPPED | OUTPATIENT
Start: 2024-02-06

## 2024-02-06 RX ORDER — ATORVASTATIN CALCIUM 20 MG/1
TABLET, FILM COATED ORAL
Qty: 90 TABLET | Refills: 1 | Status: SHIPPED | OUTPATIENT
Start: 2024-02-06

## 2024-04-30 ENCOUNTER — TELEPHONE (OUTPATIENT)
Dept: FAMILY MEDICINE CLINIC | Facility: CLINIC | Age: 69
End: 2024-04-30
Payer: MEDICARE

## 2024-04-30 NOTE — TELEPHONE ENCOUNTER
Caller: Jonathan Rodriguez    Relationship: Self    Best call back number: [phone]    PATIENT WONDERING WHEN SHE RECEIVED HER LAST TETANUS SHOT.  PLEASE CALL AND ADVISE

## 2024-04-30 NOTE — TELEPHONE ENCOUNTER
Called patient and let her know she got it in August of 2017 and they are good for 10 years voiced she understood.

## 2024-07-05 DIAGNOSIS — I51.7 LVH (LEFT VENTRICULAR HYPERTROPHY): ICD-10-CM

## 2024-07-05 DIAGNOSIS — I10 ESSENTIAL HYPERTENSION: ICD-10-CM

## 2024-07-05 RX ORDER — INDAPAMIDE 2.5 MG/1
TABLET ORAL
Qty: 180 TABLET | Refills: 3 | Status: SHIPPED | OUTPATIENT
Start: 2024-07-05

## 2024-07-22 DIAGNOSIS — E03.9 ACQUIRED HYPOTHYROIDISM: ICD-10-CM

## 2024-07-22 RX ORDER — LEVOTHYROXINE SODIUM 112 UG/1
TABLET ORAL
Qty: 90 TABLET | Refills: 1 | Status: SHIPPED | OUTPATIENT
Start: 2024-07-22

## 2024-07-24 ENCOUNTER — OFFICE VISIT (OUTPATIENT)
Dept: FAMILY MEDICINE CLINIC | Facility: CLINIC | Age: 69
End: 2024-07-24
Payer: MEDICARE

## 2024-07-24 VITALS
HEIGHT: 62 IN | TEMPERATURE: 95 F | WEIGHT: 178 LBS | HEART RATE: 80 BPM | DIASTOLIC BLOOD PRESSURE: 68 MMHG | BODY MASS INDEX: 32.76 KG/M2 | OXYGEN SATURATION: 95 % | SYSTOLIC BLOOD PRESSURE: 122 MMHG

## 2024-07-24 DIAGNOSIS — T78.40XA ALLERGY, INITIAL ENCOUNTER: ICD-10-CM

## 2024-07-24 DIAGNOSIS — R42 VERTIGO: Primary | ICD-10-CM

## 2024-07-24 PROCEDURE — 1126F AMNT PAIN NOTED NONE PRSNT: CPT | Performed by: NURSE PRACTITIONER

## 2024-07-24 PROCEDURE — 3078F DIAST BP <80 MM HG: CPT | Performed by: NURSE PRACTITIONER

## 2024-07-24 PROCEDURE — 3074F SYST BP LT 130 MM HG: CPT | Performed by: NURSE PRACTITIONER

## 2024-07-24 PROCEDURE — 99213 OFFICE O/P EST LOW 20 MIN: CPT | Performed by: NURSE PRACTITIONER

## 2024-07-24 RX ORDER — MECLIZINE HCL 12.5 MG/1
TABLET ORAL
Qty: 30 TABLET | Refills: 2 | Status: SHIPPED | OUTPATIENT
Start: 2024-07-24

## 2024-07-24 RX ORDER — FLUTICASONE PROPIONATE 50 MCG
2 SPRAY, SUSPENSION (ML) NASAL DAILY
Qty: 16 G | Refills: 2 | Status: SHIPPED | OUTPATIENT
Start: 2024-07-24

## 2024-07-24 RX ORDER — PSEUDOEPHEDRINE HCL 30 MG
30 TABLET ORAL EVERY 4 HOURS PRN
Qty: 60 TABLET | Refills: 2 | Status: SHIPPED | OUTPATIENT
Start: 2024-07-24

## 2024-07-24 RX ORDER — CETIRIZINE HYDROCHLORIDE 10 MG/1
10 TABLET ORAL DAILY
Qty: 30 TABLET | Refills: 2 | Status: SHIPPED | OUTPATIENT
Start: 2024-07-24

## 2024-07-24 NOTE — PROGRESS NOTES
"    Jonathan Rodriguez is a 68 y.o. female.     History of Present Illness  68-year-old white female patient of Dr. Almanza who comes in today with 2-day history of vertigo and nausea.  Patient states when she bends over she gets room spinning dizziness and nausea.  I explained to patient the reason for vertigo and placed her on Antivert and allergy medication.  I strongly advised her to wear mask outside especially when mowing.  I advised her not to drive and to be careful from drowsiness with the Antivert.  I told her when the dizziness resolves to wean off the medications and use them as needed.  Also instructed patient to call me if symptoms do not improve.  Orthostatic blood pressures today were normal    Blood pressure 122/62 heart rate 80            Antivert 12.5 mg 3 times daily as needed vertigo monitor for drowsiness  Zyrtec/Flonase/Sudafed 30 as needed along with Antivert  Avoid driving while on this medication  Wear mask when outside especially when mowing  Call office if symptoms do not improve       The following portions of the patient's history were reviewed and updated as appropriate: allergies, current medications, past family history, past medical history, past social history, past surgical history, and problem list.    Vitals:    07/24/24 1511 07/24/24 1517   BP: 122/62 122/68   BP Location: Right arm Right arm   Patient Position: Sitting Standing   Cuff Size: Adult Adult   Pulse: 80    Temp: 95 °F (35 °C)    TempSrc: Infrared    SpO2: 95%    Weight: 80.7 kg (178 lb)    Height: 157.5 cm (62.01\")      Body mass index is 32.55 kg/m².         Past Medical History:   Diagnosis Date    Asthma 05 2020    Copd    Back pain     COPD (chronic obstructive pulmonary disease) 05 2020    Hyperlipidemia     Hyperparathyroidism November 2002    Hypertension     Hypothyroidism     MAMMO     NEG = 2018    Obesity     Otosclerosis     Pneumonia 12/17/2018    Postmenopausal     Thyroid nodule ?    Thyromegaly " 2012    Visit for screening mammogram 2017     Past Surgical History:   Procedure Laterality Date    COLONOSCOPY      NEG = , rech     INNER EAR SURGERY Bilateral     JOINT REPLACEMENT  23    THYROIDECTOMY, PARTIAL      Left thyroid mass= Benign    TONSILLECTOMY      TUBAL ABDOMINAL LIGATION       Family History   Problem Relation Age of Onset    COPD Mother             Hyperlipidemia Father     Hypertension Father     Heart disease Father     Stroke Father     Hypertension Brother     Stroke Brother     Diabetes Brother     Heart disease Brother     Lymphoma Brother     Cancer Brother     Hyperlipidemia Brother     Colon cancer Paternal Grandmother      Immunization History   Administered Date(s) Administered    COVID-19 (MODERNA) 1st,2nd,3rd Dose Monovalent 2021, 2021    COVID-19 (MODERNA) Monovalent Original Booster 2021    Flu Vaccine Quad PF 6-35MO 10/04/2016, 10/27/2017    Fluad Quad 65+ 10/05/2020    Fluzone High Dose =>65 Years ([employer] ONLY) 10/03/2018, 2019    Fluzone High-Dose 65+yrs 10/11/2021, 10/27/2022, 2023    Hepatitis A 2018, 2019    Influenza Quad Vaccine (Inpatient) 2015    Pneumococcal Conjugate 13-Valent (PCV13) 10/03/2018    Pneumococcal Polysaccharide (PPSV23) 2019    Shingrix 2022, 2022    Tdap 2017    Zostavax 2018       Office Visit on 2024   Component Date Value Ref Range Status    TSH 2024 0.443 (L)  0.450 - 4.500 uIU/mL Final    Free T4 2024 1.55  0.82 - 1.77 ng/dL Final    T3, Free 2024 3.0  2.0 - 4.4 pg/mL Final    Ionized Calcium 2024 5.7 (H)  4.5 - 5.6 mg/dL Final    WBC 2024 5.3  3.4 - 10.8 x10E3/uL Final    RBC 2024 4.92  3.77 - 5.28 x10E6/uL Final    Hemoglobin 2024 14.5  11.1 - 15.9 g/dL Final    Hematocrit 2024 42.9  34.0 - 46.6 % Final    MCV 2024 87  79 - 97 fL Final    MCH 2024 29.5  26.6 - 33.0 pg  Final    MCHC 01/08/2024 33.8  31.5 - 35.7 g/dL Final    RDW 01/08/2024 12.2  11.7 - 15.4 % Final    Platelets 01/08/2024 276  150 - 450 x10E3/uL Final    Neutrophil Rel % 01/08/2024 57  Not Estab. % Final    Lymphocyte Rel % 01/08/2024 24  Not Estab. % Final    Monocyte Rel % 01/08/2024 10  Not Estab. % Final    Eosinophil Rel % 01/08/2024 7  Not Estab. % Final    Basophil Rel % 01/08/2024 2  Not Estab. % Final    Neutrophils Absolute 01/08/2024 3.1  1.4 - 7.0 x10E3/uL Final    Lymphocytes Absolute 01/08/2024 1.3  0.7 - 3.1 x10E3/uL Final    Monocytes Absolute 01/08/2024 0.5  0.1 - 0.9 x10E3/uL Final    Eosinophils Absolute 01/08/2024 0.4  0.0 - 0.4 x10E3/uL Final    Basophils Absolute 01/08/2024 0.1  0.0 - 0.2 x10E3/uL Final    Immature Granulocyte Rel % 01/08/2024 0  Not Estab. % Final    Immature Grans Absolute 01/08/2024 0.0  0.0 - 0.1 x10E3/uL Final    Glucose 01/08/2024 87  70 - 99 mg/dL Final    BUN 01/08/2024 17  8 - 27 mg/dL Final    Creatinine 01/08/2024 0.71  0.57 - 1.00 mg/dL Final    EGFR Result 01/08/2024 91  >59 mL/min/1.73 Final    BUN/Creatinine Ratio 01/08/2024 24  12 - 28 Final    Sodium 01/08/2024 145 (H)  134 - 144 mmol/L Final    Potassium 01/08/2024 3.7  3.5 - 5.2 mmol/L Final    Chloride 01/08/2024 104  96 - 106 mmol/L Final    Total CO2 01/08/2024 26  20 - 29 mmol/L Final    Calcium 01/08/2024 11.0 (H)  8.7 - 10.3 mg/dL Final    Total Protein 01/08/2024 6.4  6.0 - 8.5 g/dL Final    Albumin 01/08/2024 4.1  3.9 - 4.9 g/dL Final    Globulin 01/08/2024 2.3  1.5 - 4.5 g/dL Final    A/G Ratio 01/08/2024 1.8  1.2 - 2.2 Final    Total Bilirubin 01/08/2024 0.4  0.0 - 1.2 mg/dL Final    Alkaline Phosphatase 01/08/2024 167 (H)  44 - 121 IU/L Final    AST (SGOT) 01/08/2024 23  0 - 40 IU/L Final    ALT (SGPT) 01/08/2024 20  0 - 32 IU/L Final    PTH, Intact 01/08/2024 91 (H)  15 - 65 pg/mL Final         Review of Systems   Constitutional: Negative.    HENT:  Positive for congestion.    Respiratory:  Negative.     Cardiovascular: Negative.    Gastrointestinal: Negative.    Genitourinary: Negative.    Musculoskeletal: Negative.    Skin: Negative.    Neurological:  Positive for dizziness.   Psychiatric/Behavioral: Negative.         Objective   Physical Exam  Constitutional:       Appearance: Normal appearance.   HENT:      Head: Normocephalic.      Mouth/Throat:      Comments: Heavy postnasal drip  Cardiovascular:      Rate and Rhythm: Normal rate and regular rhythm.      Pulses: Normal pulses.      Heart sounds: Normal heart sounds.   Pulmonary:      Effort: Pulmonary effort is normal.      Breath sounds: Normal breath sounds.   Abdominal:      General: Bowel sounds are normal.   Musculoskeletal:         General: Normal range of motion.   Skin:     General: Skin is warm.   Neurological:      General: No focal deficit present.      Mental Status: She is alert and oriented to person, place, and time.   Psychiatric:         Mood and Affect: Mood normal.         Behavior: Behavior normal.         Procedures    Assessment & Plan   Diagnoses and all orders for this visit:    1. Vertigo (Primary)    2. Allergy, initial encounter    Other orders  -     cetirizine (ZyrTEC Allergy) 10 MG tablet; Take 1 tablet by mouth Daily.  Dispense: 30 tablet; Refill: 2  -     pseudoephedrine (Sudafed) 30 MG tablet; Take 1 tablet by mouth Every 4 (Four) Hours As Needed for Congestion.  Dispense: 60 tablet; Refill: 2  -     fluticasone (FLONASE) 50 MCG/ACT nasal spray; 2 sprays into the nostril(s) as directed by provider Daily.  Dispense: 16 g; Refill: 2  -     meclizine (ANTIVERT) 12.5 MG tablet; 1-2 tabs q8h prn  Dispense: 30 tablet; Refill: 2           Current Outpatient Medications:     albuterol sulfate  (90 Base) MCG/ACT inhaler, INHALE 2 PUFFS BY MOUTH EVERY 4 HOURS AS NEEDED FOR SHORTNESS OF BREATH, Disp: 18 g, Rfl: 2    amLODIPine (NORVASC) 5 MG tablet, Take 1 tablet by mouth once daily, Disp: 90 tablet, Rfl: 3    aspirin  325 MG tablet, Take 1 tablet by mouth Daily., Disp: , Rfl:     atorvastatin (LIPITOR) 20 MG tablet, TAKE 1 TABLET BY MOUTH ONCE DAILY AT NIGHT, Disp: 90 tablet, Rfl: 1    budesonide-formoterol (SYMBICORT) 160-4.5 MCG/ACT inhaler, Inhale 2 puffs Daily., Disp: , Rfl:     indapamide (LOZOL) 2.5 MG tablet, TAKE 2 TABLETS BY MOUTH IN THE MORNING, Disp: 180 tablet, Rfl: 3    levothyroxine (SYNTHROID, LEVOTHROID) 112 MCG tablet, Take 1 tablet by mouth once daily, Disp: 90 tablet, Rfl: 1    omeprazole (priLOSEC) 40 MG capsule, Take 1 capsule by mouth Daily., Disp: 90 capsule, Rfl: 3    cetirizine (ZyrTEC Allergy) 10 MG tablet, Take 1 tablet by mouth Daily., Disp: 30 tablet, Rfl: 2    fluticasone (FLONASE) 50 MCG/ACT nasal spray, 2 sprays into the nostril(s) as directed by provider Daily., Disp: 16 g, Rfl: 2    meclizine (ANTIVERT) 12.5 MG tablet, 1-2 tabs q8h prn, Disp: 30 tablet, Rfl: 2    pseudoephedrine (Sudafed) 30 MG tablet, Take 1 tablet by mouth Every 4 (Four) Hours As Needed for Congestion., Disp: 60 tablet, Rfl: 2           Fatimah Gregg, APRN 7/24/2024 15:31 EDT  This note has been electronically signed

## 2024-07-24 NOTE — PATIENT INSTRUCTIONS
Antivert 12.5 mg 3 times daily as needed vertigo monitor for drowsiness  Zyrtec/Flonase/Sudafed 30 as needed along with Antivert  Avoid driving while on this medication  Wear mask when outside especially when mowing  Call office if symptoms do not improve

## 2024-08-12 DIAGNOSIS — E78.2 MIXED HYPERLIPIDEMIA: ICD-10-CM

## 2024-08-12 RX ORDER — ATORVASTATIN CALCIUM 20 MG/1
TABLET, FILM COATED ORAL
Qty: 90 TABLET | Refills: 3 | Status: SHIPPED | OUTPATIENT
Start: 2024-08-12

## 2024-08-13 ENCOUNTER — TRANSCRIBE ORDERS (OUTPATIENT)
Dept: FAMILY MEDICINE CLINIC | Facility: CLINIC | Age: 69
End: 2024-08-13
Payer: MEDICARE

## 2024-08-13 DIAGNOSIS — Z12.31 BREAST CANCER SCREENING BY MAMMOGRAM: Primary | ICD-10-CM

## 2024-08-19 ENCOUNTER — OFFICE (AMBULATORY)
Age: 69
End: 2024-08-19

## 2024-08-19 ENCOUNTER — OFFICE (AMBULATORY)
Dept: URBAN - METROPOLITAN AREA PATHOLOGY 19 | Facility: PATHOLOGY | Age: 69
End: 2024-08-19
Payer: COMMERCIAL

## 2024-08-19 ENCOUNTER — ON CAMPUS - OUTPATIENT (AMBULATORY)
Age: 69
End: 2024-08-19

## 2024-08-19 ENCOUNTER — ON CAMPUS - OUTPATIENT (AMBULATORY)
Dept: URBAN - METROPOLITAN AREA HOSPITAL 2 | Facility: HOSPITAL | Age: 69
End: 2024-08-19

## 2024-08-19 ENCOUNTER — OFFICE (AMBULATORY)
Age: 69
End: 2024-08-19
Payer: COMMERCIAL

## 2024-08-19 ENCOUNTER — OFFICE (AMBULATORY)
Dept: URBAN - METROPOLITAN AREA PATHOLOGY 19 | Facility: PATHOLOGY | Age: 69
End: 2024-08-19

## 2024-08-19 VITALS
SYSTOLIC BLOOD PRESSURE: 116 MMHG | RESPIRATION RATE: 17 BRPM | SYSTOLIC BLOOD PRESSURE: 120 MMHG | OXYGEN SATURATION: 97 % | DIASTOLIC BLOOD PRESSURE: 139 MMHG | RESPIRATION RATE: 17 BRPM | RESPIRATION RATE: 12 BRPM | DIASTOLIC BLOOD PRESSURE: 85 MMHG | DIASTOLIC BLOOD PRESSURE: 76 MMHG | HEART RATE: 74 BPM | HEART RATE: 71 BPM | HEART RATE: 71 BPM | DIASTOLIC BLOOD PRESSURE: 62 MMHG | HEART RATE: 68 BPM | RESPIRATION RATE: 18 BRPM | SYSTOLIC BLOOD PRESSURE: 106 MMHG | RESPIRATION RATE: 18 BRPM | DIASTOLIC BLOOD PRESSURE: 68 MMHG | SYSTOLIC BLOOD PRESSURE: 106 MMHG | HEART RATE: 70 BPM | HEART RATE: 71 BPM | DIASTOLIC BLOOD PRESSURE: 139 MMHG | DIASTOLIC BLOOD PRESSURE: 68 MMHG | HEART RATE: 66 BPM | DIASTOLIC BLOOD PRESSURE: 62 MMHG | SYSTOLIC BLOOD PRESSURE: 160 MMHG | HEART RATE: 69 BPM | HEART RATE: 68 BPM | SYSTOLIC BLOOD PRESSURE: 121 MMHG | HEART RATE: 66 BPM | HEART RATE: 71 BPM | RESPIRATION RATE: 18 BRPM | DIASTOLIC BLOOD PRESSURE: 68 MMHG | SYSTOLIC BLOOD PRESSURE: 106 MMHG | DIASTOLIC BLOOD PRESSURE: 71 MMHG | WEIGHT: 176 LBS | HEART RATE: 67 BPM | HEART RATE: 67 BPM | HEART RATE: 74 BPM | DIASTOLIC BLOOD PRESSURE: 85 MMHG | OXYGEN SATURATION: 97 % | SYSTOLIC BLOOD PRESSURE: 120 MMHG | OXYGEN SATURATION: 99 % | SYSTOLIC BLOOD PRESSURE: 125 MMHG | OXYGEN SATURATION: 99 % | DIASTOLIC BLOOD PRESSURE: 62 MMHG | HEIGHT: 64 IN | SYSTOLIC BLOOD PRESSURE: 160 MMHG | SYSTOLIC BLOOD PRESSURE: 143 MMHG | HEIGHT: 64 IN | OXYGEN SATURATION: 99 % | DIASTOLIC BLOOD PRESSURE: 70 MMHG | DIASTOLIC BLOOD PRESSURE: 71 MMHG | DIASTOLIC BLOOD PRESSURE: 85 MMHG | SYSTOLIC BLOOD PRESSURE: 160 MMHG | DIASTOLIC BLOOD PRESSURE: 76 MMHG | RESPIRATION RATE: 18 BRPM | SYSTOLIC BLOOD PRESSURE: 116 MMHG | RESPIRATION RATE: 16 BRPM | DIASTOLIC BLOOD PRESSURE: 76 MMHG | OXYGEN SATURATION: 98 % | DIASTOLIC BLOOD PRESSURE: 70 MMHG | WEIGHT: 176 LBS | SYSTOLIC BLOOD PRESSURE: 106 MMHG | RESPIRATION RATE: 12 BRPM | SYSTOLIC BLOOD PRESSURE: 125 MMHG | RESPIRATION RATE: 18 BRPM | SYSTOLIC BLOOD PRESSURE: 160 MMHG | SYSTOLIC BLOOD PRESSURE: 121 MMHG | SYSTOLIC BLOOD PRESSURE: 116 MMHG | HEART RATE: 67 BPM | SYSTOLIC BLOOD PRESSURE: 120 MMHG | HEART RATE: 70 BPM | HEIGHT: 64 IN | HEART RATE: 70 BPM | SYSTOLIC BLOOD PRESSURE: 125 MMHG | SYSTOLIC BLOOD PRESSURE: 120 MMHG | RESPIRATION RATE: 17 BRPM | SYSTOLIC BLOOD PRESSURE: 121 MMHG | OXYGEN SATURATION: 98 % | HEART RATE: 66 BPM | HEART RATE: 66 BPM | DIASTOLIC BLOOD PRESSURE: 71 MMHG | HEART RATE: 68 BPM | DIASTOLIC BLOOD PRESSURE: 62 MMHG | DIASTOLIC BLOOD PRESSURE: 76 MMHG | DIASTOLIC BLOOD PRESSURE: 85 MMHG | DIASTOLIC BLOOD PRESSURE: 62 MMHG | RESPIRATION RATE: 16 BRPM | DIASTOLIC BLOOD PRESSURE: 71 MMHG | RESPIRATION RATE: 12 BRPM | RESPIRATION RATE: 16 BRPM | RESPIRATION RATE: 18 BRPM | OXYGEN SATURATION: 97 % | RESPIRATION RATE: 16 BRPM | HEART RATE: 69 BPM | WEIGHT: 176 LBS | SYSTOLIC BLOOD PRESSURE: 120 MMHG | DIASTOLIC BLOOD PRESSURE: 76 MMHG | DIASTOLIC BLOOD PRESSURE: 70 MMHG | DIASTOLIC BLOOD PRESSURE: 69 MMHG | DIASTOLIC BLOOD PRESSURE: 71 MMHG | DIASTOLIC BLOOD PRESSURE: 139 MMHG | OXYGEN SATURATION: 98 % | DIASTOLIC BLOOD PRESSURE: 71 MMHG | RESPIRATION RATE: 12 BRPM | DIASTOLIC BLOOD PRESSURE: 85 MMHG | HEART RATE: 74 BPM | RESPIRATION RATE: 17 BRPM | DIASTOLIC BLOOD PRESSURE: 69 MMHG | HEART RATE: 68 BPM | TEMPERATURE: 97.7 F | SYSTOLIC BLOOD PRESSURE: 125 MMHG | DIASTOLIC BLOOD PRESSURE: 139 MMHG | SYSTOLIC BLOOD PRESSURE: 125 MMHG | SYSTOLIC BLOOD PRESSURE: 121 MMHG | HEART RATE: 70 BPM | SYSTOLIC BLOOD PRESSURE: 143 MMHG | DIASTOLIC BLOOD PRESSURE: 85 MMHG | HEART RATE: 69 BPM | DIASTOLIC BLOOD PRESSURE: 70 MMHG | OXYGEN SATURATION: 99 % | DIASTOLIC BLOOD PRESSURE: 71 MMHG | TEMPERATURE: 97.7 F | DIASTOLIC BLOOD PRESSURE: 68 MMHG | SYSTOLIC BLOOD PRESSURE: 143 MMHG | HEART RATE: 68 BPM | RESPIRATION RATE: 17 BRPM | HEART RATE: 67 BPM | HEART RATE: 70 BPM | DIASTOLIC BLOOD PRESSURE: 70 MMHG | DIASTOLIC BLOOD PRESSURE: 69 MMHG | SYSTOLIC BLOOD PRESSURE: 106 MMHG | HEART RATE: 69 BPM | OXYGEN SATURATION: 98 % | HEIGHT: 64 IN | RESPIRATION RATE: 16 BRPM | TEMPERATURE: 97.7 F | OXYGEN SATURATION: 98 % | RESPIRATION RATE: 12 BRPM | SYSTOLIC BLOOD PRESSURE: 116 MMHG | DIASTOLIC BLOOD PRESSURE: 69 MMHG | WEIGHT: 176 LBS | SYSTOLIC BLOOD PRESSURE: 116 MMHG | DIASTOLIC BLOOD PRESSURE: 139 MMHG | SYSTOLIC BLOOD PRESSURE: 143 MMHG | WEIGHT: 176 LBS | HEART RATE: 67 BPM | TEMPERATURE: 97.7 F | HEIGHT: 64 IN | OXYGEN SATURATION: 97 % | OXYGEN SATURATION: 98 % | SYSTOLIC BLOOD PRESSURE: 160 MMHG | OXYGEN SATURATION: 99 % | HEART RATE: 66 BPM | RESPIRATION RATE: 12 BRPM | HEART RATE: 74 BPM | DIASTOLIC BLOOD PRESSURE: 68 MMHG | HEART RATE: 70 BPM | DIASTOLIC BLOOD PRESSURE: 68 MMHG | HEART RATE: 71 BPM | DIASTOLIC BLOOD PRESSURE: 69 MMHG | HEART RATE: 69 BPM | HEART RATE: 68 BPM | WEIGHT: 176 LBS | SYSTOLIC BLOOD PRESSURE: 106 MMHG | OXYGEN SATURATION: 97 % | SYSTOLIC BLOOD PRESSURE: 160 MMHG | RESPIRATION RATE: 18 BRPM | DIASTOLIC BLOOD PRESSURE: 70 MMHG | OXYGEN SATURATION: 97 % | DIASTOLIC BLOOD PRESSURE: 69 MMHG | HEART RATE: 74 BPM | SYSTOLIC BLOOD PRESSURE: 121 MMHG | HEIGHT: 64 IN | DIASTOLIC BLOOD PRESSURE: 70 MMHG | TEMPERATURE: 97.7 F | HEART RATE: 69 BPM | WEIGHT: 176 LBS | DIASTOLIC BLOOD PRESSURE: 76 MMHG | HEART RATE: 68 BPM | RESPIRATION RATE: 12 BRPM | RESPIRATION RATE: 16 BRPM | SYSTOLIC BLOOD PRESSURE: 143 MMHG | SYSTOLIC BLOOD PRESSURE: 121 MMHG | DIASTOLIC BLOOD PRESSURE: 76 MMHG | DIASTOLIC BLOOD PRESSURE: 62 MMHG | HEART RATE: 74 BPM | DIASTOLIC BLOOD PRESSURE: 85 MMHG | SYSTOLIC BLOOD PRESSURE: 120 MMHG | SYSTOLIC BLOOD PRESSURE: 106 MMHG | OXYGEN SATURATION: 97 % | HEART RATE: 71 BPM | DIASTOLIC BLOOD PRESSURE: 62 MMHG | HEART RATE: 66 BPM | DIASTOLIC BLOOD PRESSURE: 139 MMHG | SYSTOLIC BLOOD PRESSURE: 116 MMHG | SYSTOLIC BLOOD PRESSURE: 116 MMHG | HEIGHT: 64 IN | SYSTOLIC BLOOD PRESSURE: 143 MMHG | SYSTOLIC BLOOD PRESSURE: 125 MMHG | DIASTOLIC BLOOD PRESSURE: 139 MMHG | RESPIRATION RATE: 16 BRPM | OXYGEN SATURATION: 99 % | HEART RATE: 69 BPM | HEART RATE: 67 BPM | RESPIRATION RATE: 17 BRPM | OXYGEN SATURATION: 99 % | DIASTOLIC BLOOD PRESSURE: 68 MMHG | HEART RATE: 70 BPM | HEART RATE: 71 BPM | OXYGEN SATURATION: 98 % | DIASTOLIC BLOOD PRESSURE: 69 MMHG | HEART RATE: 67 BPM | TEMPERATURE: 97.7 F | SYSTOLIC BLOOD PRESSURE: 120 MMHG | HEART RATE: 66 BPM | HEART RATE: 74 BPM | SYSTOLIC BLOOD PRESSURE: 143 MMHG | SYSTOLIC BLOOD PRESSURE: 160 MMHG | SYSTOLIC BLOOD PRESSURE: 121 MMHG | SYSTOLIC BLOOD PRESSURE: 125 MMHG | RESPIRATION RATE: 17 BRPM | TEMPERATURE: 97.7 F

## 2024-08-19 DIAGNOSIS — K63.5 POLYP OF COLON: ICD-10-CM

## 2024-08-19 DIAGNOSIS — D12.5 BENIGN NEOPLASM OF SIGMOID COLON: ICD-10-CM

## 2024-08-19 DIAGNOSIS — Z12.11 ENCOUNTER FOR SCREENING FOR MALIGNANT NEOPLASM OF COLON: ICD-10-CM

## 2024-08-19 LAB
GI HISTOLOGY: A. CECUM: (no result)
GI HISTOLOGY: B. SIGMOID COLON: (no result)
GI HISTOLOGY: PDF REPORT: (no result)

## 2024-08-19 PROCEDURE — 88305 TISSUE EXAM BY PATHOLOGIST: CPT | Mod: 26 | Performed by: PATHOLOGY

## 2024-08-19 PROCEDURE — 45385 COLONOSCOPY W/LESION REMOVAL: CPT | Mod: PT | Performed by: INTERNAL MEDICINE

## 2024-09-09 ENCOUNTER — HOSPITAL ENCOUNTER (OUTPATIENT)
Dept: MAMMOGRAPHY | Facility: HOSPITAL | Age: 69
Discharge: HOME | End: 2024-09-09
Admitting: FAMILY MEDICINE
Payer: MEDICARE

## 2024-09-09 DIAGNOSIS — Z12.31 BREAST CANCER SCREENING BY MAMMOGRAM: ICD-10-CM

## 2024-09-09 PROCEDURE — 77067 SCR MAMMO BI INCL CAD: CPT

## 2024-09-09 PROCEDURE — 77063 BREAST TOMOSYNTHESIS BI: CPT

## 2024-10-19 DIAGNOSIS — I10 ESSENTIAL HYPERTENSION: ICD-10-CM

## 2024-10-21 RX ORDER — AMLODIPINE BESYLATE 5 MG/1
TABLET ORAL
Qty: 90 TABLET | Refills: 3 | Status: SHIPPED | OUTPATIENT
Start: 2024-10-21

## 2024-11-25 ENCOUNTER — OFFICE VISIT (OUTPATIENT)
Dept: FAMILY MEDICINE CLINIC | Facility: CLINIC | Age: 69
End: 2024-11-25
Payer: MEDICARE

## 2024-11-25 VITALS
WEIGHT: 179 LBS | DIASTOLIC BLOOD PRESSURE: 74 MMHG | HEIGHT: 62 IN | HEART RATE: 65 BPM | TEMPERATURE: 97.7 F | SYSTOLIC BLOOD PRESSURE: 128 MMHG | BODY MASS INDEX: 32.94 KG/M2 | OXYGEN SATURATION: 94 %

## 2024-11-25 DIAGNOSIS — E21.0 PRIMARY HYPERPARATHYROIDISM: ICD-10-CM

## 2024-11-25 DIAGNOSIS — E78.2 MIXED HYPERLIPIDEMIA: ICD-10-CM

## 2024-11-25 DIAGNOSIS — M17.0 PRIMARY OSTEOARTHRITIS OF BOTH KNEES: ICD-10-CM

## 2024-11-25 DIAGNOSIS — M85.89 OSTEOPENIA OF MULTIPLE SITES: ICD-10-CM

## 2024-11-25 DIAGNOSIS — J45.40 MODERATE PERSISTENT REACTIVE AIRWAY DISEASE WITHOUT COMPLICATION: Primary | ICD-10-CM

## 2024-11-25 DIAGNOSIS — K21.9 CHRONIC GERD: ICD-10-CM

## 2024-11-25 DIAGNOSIS — E03.9 ACQUIRED HYPOTHYROIDISM: ICD-10-CM

## 2024-11-25 DIAGNOSIS — I10 ESSENTIAL HYPERTENSION: ICD-10-CM

## 2024-11-25 PROCEDURE — 3074F SYST BP LT 130 MM HG: CPT | Performed by: FAMILY MEDICINE

## 2024-11-25 PROCEDURE — 1159F MED LIST DOCD IN RCRD: CPT | Performed by: FAMILY MEDICINE

## 2024-11-25 PROCEDURE — 3078F DIAST BP <80 MM HG: CPT | Performed by: FAMILY MEDICINE

## 2024-11-25 PROCEDURE — 99214 OFFICE O/P EST MOD 30 MIN: CPT | Performed by: FAMILY MEDICINE

## 2024-11-25 PROCEDURE — 1126F AMNT PAIN NOTED NONE PRSNT: CPT | Performed by: FAMILY MEDICINE

## 2024-11-25 PROCEDURE — 1160F RVW MEDS BY RX/DR IN RCRD: CPT | Performed by: FAMILY MEDICINE

## 2024-11-25 RX ORDER — ALBUTEROL SULFATE 90 UG/1
2 INHALANT RESPIRATORY (INHALATION) EVERY 4 HOURS PRN
Qty: 18 G | Refills: 2 | Status: SHIPPED | OUTPATIENT
Start: 2024-11-25

## 2024-11-25 NOTE — PROGRESS NOTES
Answers submitted by the patient for this visit:  Other (Submitted on 11/18/2024)  Please describe your symptoms.: [id number] check ip  Have you had these symptoms before?: No  How long have you been having these symptoms?: 1-4 days  Please list any medications you are currently taking for this condition.: None  Please describe any probable cause for these symptoms. : None  Primary Reason for Visit (Submitted on 11/18/2024)  What is the primary reason for your visit?: Problem Not Listed  Subjective   Jonathan Rodriguez is a 69 y.o. female.   Chief Complaint   Patient presents with    Hypothyroidism    Hyperlipidemia    Hypertension       History of Present Illness   69 y.o. female presents to the office today to follow-up on chronic medical problems including hypertension, hypothyroidism, hyperparathyroidism, GERD, multi location osteoarthritis, reactive airway disease.  I last saw her in January of this year.  We had lab work done then as well.  Everything was stable then.    Mammogram was September 9 of this year.  It was good.  DEXA scan was September 1, 2022.    History of Present Illness  The patient presents for a routine checkup.    She reports no issues with her current medications and has not experienced any side effects.    Her most recent mammogram was conducted in 09/2024, and a colonoscopy was performed on 08/19/2024. She has expressed a desire to undergo a bone density test in 2025. She plans to receive her influenza vaccine after Thanksgiving.    She experiences shortness of breath and uses albuterol as needed, but not on a daily basis.     She has a history of primary hyperparathyroidism and underwent surgery to remove the left side of her thyroid gland in 2010.      Patient Active Problem List    Diagnosis Date Noted    Osteopenia of multiple sites 11/25/2024     Note Last Updated: 11/25/2024     On DEXA - 2021      Primary hyperparathyroidism 01/18/2023    Primary osteoarthritis of both knees 11/08/2022     Cough 01/03/2022    Acute nonintractable headache 01/03/2022    SOB (shortness of breath) 01/03/2022    Chronic GERD 10/11/2021    Reactive airway disease without complication 10/05/2020    Arthritis of right knee 10/04/2020    LVH (left ventricular hypertrophy) 06/01/2020    Acquired hypothyroidism 11/25/2019    Otosclerosis of both ears 11/25/2019     Note Last Updated: 11/25/2019     Replaced bones in ears with metal pistons - NO MRI's      MRI contraindicated due to metal implant 11/25/2019     Note Last Updated: 11/25/2019     Due to metal implants in inner ear      Mixed hyperlipidemia 08/15/2019    Allergic rhinitis 12/26/2018    Herpes zoster 12/17/2018    Loss of height 06/25/2018    Encounter for general adult medical examination without abnormal findings 06/21/2017    Visit for screening mammogram 06/21/2017    Obesity with body mass index 30 or greater 06/21/2016    Hemorrhoids, external 06/11/2012    Obesity 06/11/2012    Thyromegaly 06/11/2012    Vaginitis, atrophic 06/11/2012    Decreased white blood cell count 06/04/2012    Essential hypertension 06/04/2012           Past Surgical History:   Procedure Laterality Date    COLONOSCOPY      NEG = 2014, rech 2024    INNER EAR SURGERY Bilateral     JOINT REPLACEMENT  5-2-23    THYROIDECTOMY, PARTIAL      Left thyroid mass= Benign.  sometime after 2010    TONSILLECTOMY      TUBAL ABDOMINAL LIGATION       Current Outpatient Medications on File Prior to Visit   Medication Sig    amLODIPine (NORVASC) 5 MG tablet Take 1 tablet by mouth once daily    aspirin 325 MG tablet Take 1 tablet by mouth Daily.    atorvastatin (LIPITOR) 20 MG tablet TAKE 1 TABLET BY MOUTH ONCE DAILY AT NIGHT    budesonide-formoterol (SYMBICORT) 160-4.5 MCG/ACT inhaler Inhale 2 puffs Daily.    indapamide (LOZOL) 2.5 MG tablet TAKE 2 TABLETS BY MOUTH IN THE MORNING    levothyroxine (SYNTHROID, LEVOTHROID) 112 MCG tablet Take 1 tablet by mouth once daily    omeprazole (priLOSEC) 40 MG  "capsule Take 1 capsule by mouth Daily.    [DISCONTINUED] albuterol sulfate  (90 Base) MCG/ACT inhaler INHALE 2 PUFFS BY MOUTH EVERY 4 HOURS AS NEEDED FOR SHORTNESS OF BREATH    [DISCONTINUED] cetirizine (ZyrTEC Allergy) 10 MG tablet Take 1 tablet by mouth Daily.    [DISCONTINUED] fluticasone (FLONASE) 50 MCG/ACT nasal spray 2 sprays into the nostril(s) as directed by provider Daily.    [DISCONTINUED] meclizine (ANTIVERT) 12.5 MG tablet 1-2 tabs q8h prn    [DISCONTINUED] pseudoephedrine (Sudafed) 30 MG tablet Take 1 tablet by mouth Every 4 (Four) Hours As Needed for Congestion.     No current facility-administered medications on file prior to visit.     No Known Allergies  Social History     Socioeconomic History    Marital status:      Spouse name: Wilfrido    Number of children: 2   Tobacco Use    Smoking status: Never     Passive exposure: Past    Smokeless tobacco: Never   Vaping Use    Vaping status: Never Used   Substance and Sexual Activity    Alcohol use: No    Drug use: No    Sexual activity: Not Currently     Partners: Male     Birth control/protection: None, Tubal ligation     Family History   Problem Relation Age of Onset    COPD Mother             Hyperlipidemia Father     Hypertension Father     Heart disease Father     Stroke Father     Hypertension Brother     Stroke Brother     Diabetes Brother     Heart disease Brother     Lymphoma Brother     Cancer Brother     Hyperlipidemia Brother     Colon cancer Paternal Grandmother        Review of Systems    Objective   /74 (BP Location: Right arm, Patient Position: Sitting, Cuff Size: Adult)   Pulse 65   Temp 97.7 °F (36.5 °C) (Infrared)   Ht 157.5 cm (62.01\")   Wt 81.2 kg (179 lb)   LMP  (LMP Unknown)   SpO2 94%   BMI 32.73 kg/m²   Physical Exam  Constitutional:       Appearance: She is well-developed.      Comments:      HENT:      Head: Normocephalic and atraumatic.   Eyes:      Conjunctiva/sclera: Conjunctivae normal. "   Cardiovascular:      Rate and Rhythm: Normal rate.   Pulmonary:      Effort: Pulmonary effort is normal.   Musculoskeletal:         General: Normal range of motion.      Cervical back: Normal range of motion.   Skin:     General: Skin is warm and dry.      Findings: No rash.   Neurological:      Mental Status: She is alert and oriented to person, place, and time.   Psychiatric:         Behavior: Behavior normal.       Physical Exam  Vital Signs  Blood pressure is 128/74.      No visits with results within 4 Month(s) from this visit.   Latest known visit with results is:   Office Visit on 01/08/2024   Component Date Value Ref Range Status    TSH 01/08/2024 0.443 (L)  0.450 - 4.500 uIU/mL Final    Free T4 01/08/2024 1.55  0.82 - 1.77 ng/dL Final    T3, Free 01/08/2024 3.0  2.0 - 4.4 pg/mL Final    Ionized Calcium 01/08/2024 5.7 (H)  4.5 - 5.6 mg/dL Final    WBC 01/08/2024 5.3  3.4 - 10.8 x10E3/uL Final    RBC 01/08/2024 4.92  3.77 - 5.28 x10E6/uL Final    Hemoglobin 01/08/2024 14.5  11.1 - 15.9 g/dL Final    Hematocrit 01/08/2024 42.9  34.0 - 46.6 % Final    MCV 01/08/2024 87  79 - 97 fL Final    MCH 01/08/2024 29.5  26.6 - 33.0 pg Final    MCHC 01/08/2024 33.8  31.5 - 35.7 g/dL Final    RDW 01/08/2024 12.2  11.7 - 15.4 % Final    Platelets 01/08/2024 276  150 - 450 x10E3/uL Final    Neutrophil Rel % 01/08/2024 57  Not Estab. % Final    Lymphocyte Rel % 01/08/2024 24  Not Estab. % Final    Monocyte Rel % 01/08/2024 10  Not Estab. % Final    Eosinophil Rel % 01/08/2024 7  Not Estab. % Final    Basophil Rel % 01/08/2024 2  Not Estab. % Final    Neutrophils Absolute 01/08/2024 3.1  1.4 - 7.0 x10E3/uL Final    Lymphocytes Absolute 01/08/2024 1.3  0.7 - 3.1 x10E3/uL Final    Monocytes Absolute 01/08/2024 0.5  0.1 - 0.9 x10E3/uL Final    Eosinophils Absolute 01/08/2024 0.4  0.0 - 0.4 x10E3/uL Final    Basophils Absolute 01/08/2024 0.1  0.0 - 0.2 x10E3/uL Final    Immature Granulocyte Rel % 01/08/2024 0  Not Estab. % Final     Immature Grans Absolute 01/08/2024 0.0  0.0 - 0.1 x10E3/uL Final    Glucose 01/08/2024 87  70 - 99 mg/dL Final    BUN 01/08/2024 17  8 - 27 mg/dL Final    Creatinine 01/08/2024 0.71  0.57 - 1.00 mg/dL Final    EGFR Result 01/08/2024 91  >59 mL/min/1.73 Final    BUN/Creatinine Ratio 01/08/2024 24  12 - 28 Final    Sodium 01/08/2024 145 (H)  134 - 144 mmol/L Final    Potassium 01/08/2024 3.7  3.5 - 5.2 mmol/L Final    Chloride 01/08/2024 104  96 - 106 mmol/L Final    Total CO2 01/08/2024 26  20 - 29 mmol/L Final    Calcium 01/08/2024 11.0 (H)  8.7 - 10.3 mg/dL Final    Total Protein 01/08/2024 6.4  6.0 - 8.5 g/dL Final    Albumin 01/08/2024 4.1  3.9 - 4.9 g/dL Final    Globulin 01/08/2024 2.3  1.5 - 4.5 g/dL Final    A/G Ratio 01/08/2024 1.8  1.2 - 2.2 Final    Total Bilirubin 01/08/2024 0.4  0.0 - 1.2 mg/dL Final    Alkaline Phosphatase 01/08/2024 167 (H)  44 - 121 IU/L Final    AST (SGOT) 01/08/2024 23  0 - 40 IU/L Final    ALT (SGPT) 01/08/2024 20  0 - 32 IU/L Final    PTH, Intact 01/08/2024 91 (H)  15 - 65 pg/mL Final     Results  Imaging  Bone density test showed osteopenia.    BMI is >= 30 and <35. (Class 1 Obesity). The following options were offered after discussion;: exercise counseling/recommendations and nutrition counseling/recommendations     Assessment & Plan   Diagnoses and all orders for this visit:    1. Moderate persistent reactive airway disease without complication (Primary)  -     albuterol sulfate  (90 Base) MCG/ACT inhaler; Inhale 2 puffs Every 4 (Four) Hours As Needed for Shortness of Air.  Dispense: 18 g; Refill: 2    2. Primary osteoarthritis of both knees    3. Chronic GERD    4. Primary hyperparathyroidism  -     PTH, Intact  -     Calcium, Ionized    5. Acquired hypothyroidism  -     T4, Free  -     T3, Free  -     TSH    6. Mixed hyperlipidemia  -     Lipid Panel    7. Essential hypertension  -     CBC & Differential  -     Comprehensive Metabolic Panel    8. Osteopenia of  multiple sites      Assessment & Plan  1. Osteopenia.  Her bone density test from 2022 indicated osteopenia, with bones slightly thinner than normal but not to the extent of osteoporosis. A follow-up bone density test is recommended every few years. She prefers to have the bone density test along with her mammogram next year.    2. Primary Hyperparathyroidism.  Her calcium levels were recorded as 11 and 10.7 in the previous tests. Blood work will be conducted to monitor thyroid and calcium levels. As long as her calcium levels do not go excessively high, no further intervention is required.    3. Medication Management.  Albuterol refills have been provided. She relies on her rescue inhaler and the refills were set to  in two days. The prescription for albuterol has been renewed.    4. Health Maintenance.  She is caught up on her mammogram, which was done in 2024. Her colonoscopy was done on 2024, with a 10-year recheck. She plans to get her flu shot after Thanksgiving. She is advised to receive the COVID-19 vaccine, especially considering her lung condition, but she has decided to prioritize the flu shot for now.    Follow-up  Patient is scheduled for a follow-up visit on 2024.        Call with any problems or concerns before next visit       Return in about 6 months (around 2025).  Patient or patient representative verbalized consent for the use of Ambient Listening during the visit with  Kerri Almanza MD for chart documentation. 2024  13:51 EST    Part of this note may be an electronic transcription/translation of spoken language to printed text using the Dragon Dictation System    Kerri Almanza MD3:51 EST  This note has been electronically signed

## 2024-11-26 DIAGNOSIS — I10 ESSENTIAL HYPERTENSION: Primary | ICD-10-CM

## 2024-11-26 DIAGNOSIS — E87.6 HYPOKALEMIA: ICD-10-CM

## 2024-11-26 LAB
ALBUMIN SERPL-MCNC: 4.3 G/DL (ref 3.8–4.8)
ALP SERPL-CCNC: 166 IU/L (ref 44–121)
ALT SERPL-CCNC: 28 IU/L (ref 0–32)
AST SERPL-CCNC: 24 IU/L (ref 0–40)
BASOPHILS # BLD AUTO: 0.1 X10E3/UL (ref 0–0.2)
BASOPHILS NFR BLD AUTO: 1 %
BILIRUB SERPL-MCNC: 0.5 MG/DL (ref 0–1.2)
BUN SERPL-MCNC: 18 MG/DL (ref 8–27)
BUN/CREAT SERPL: 24 (ref 12–28)
CA-I SERPL ISE-MCNC: 5.6 MG/DL (ref 4.5–5.6)
CALCIUM SERPL-MCNC: 10.6 MG/DL (ref 8.7–10.3)
CHLORIDE SERPL-SCNC: 101 MMOL/L (ref 96–106)
CHOLEST SERPL-MCNC: 184 MG/DL (ref 100–199)
CO2 SERPL-SCNC: 27 MMOL/L (ref 20–29)
CREAT SERPL-MCNC: 0.74 MG/DL (ref 0.57–1)
EGFRCR SERPLBLD CKD-EPI 2021: 86 ML/MIN/1.73
EOSINOPHIL # BLD AUTO: 0.2 X10E3/UL (ref 0–0.4)
EOSINOPHIL NFR BLD AUTO: 2 %
ERYTHROCYTE [DISTWIDTH] IN BLOOD BY AUTOMATED COUNT: 12.5 % (ref 11.7–15.4)
GLOBULIN SER CALC-MCNC: 2.3 G/DL (ref 1.5–4.5)
GLUCOSE SERPL-MCNC: 90 MG/DL (ref 70–99)
HCT VFR BLD AUTO: 45.5 % (ref 34–46.6)
HDLC SERPL-MCNC: 52 MG/DL
HGB BLD-MCNC: 14.3 G/DL (ref 11.1–15.9)
IMM GRANULOCYTES # BLD AUTO: 0 X10E3/UL (ref 0–0.1)
IMM GRANULOCYTES NFR BLD AUTO: 0 %
LDLC SERPL CALC-MCNC: 109 MG/DL (ref 0–99)
LYMPHOCYTES # BLD AUTO: 1.4 X10E3/UL (ref 0.7–3.1)
LYMPHOCYTES NFR BLD AUTO: 21 %
MCH RBC QN AUTO: 28.8 PG (ref 26.6–33)
MCHC RBC AUTO-ENTMCNC: 31.4 G/DL (ref 31.5–35.7)
MCV RBC AUTO: 92 FL (ref 79–97)
MONOCYTES # BLD AUTO: 0.6 X10E3/UL (ref 0.1–0.9)
MONOCYTES NFR BLD AUTO: 8 %
NEUTROPHILS # BLD AUTO: 4.7 X10E3/UL (ref 1.4–7)
NEUTROPHILS NFR BLD AUTO: 68 %
PLATELET # BLD AUTO: 281 X10E3/UL (ref 150–450)
POTASSIUM SERPL-SCNC: 3.2 MMOL/L (ref 3.5–5.2)
PROT SERPL-MCNC: 6.6 G/DL (ref 6–8.5)
PTH-INTACT SERPL-MCNC: 121 PG/ML (ref 15–65)
RBC # BLD AUTO: 4.96 X10E6/UL (ref 3.77–5.28)
SODIUM SERPL-SCNC: 141 MMOL/L (ref 134–144)
T3FREE SERPL-MCNC: 3.1 PG/ML (ref 2–4.4)
T4 FREE SERPL-MCNC: 1.66 NG/DL (ref 0.82–1.77)
TRIGL SERPL-MCNC: 127 MG/DL (ref 0–149)
TSH SERPL DL<=0.005 MIU/L-ACNC: 0.75 UIU/ML (ref 0.45–4.5)
VLDLC SERPL CALC-MCNC: 23 MG/DL (ref 5–40)
WBC # BLD AUTO: 6.9 X10E3/UL (ref 3.4–10.8)

## 2024-11-26 RX ORDER — POTASSIUM CHLORIDE 600 MG/1
8 TABLET, FILM COATED, EXTENDED RELEASE ORAL DAILY
Qty: 90 TABLET | Refills: 1 | Status: SHIPPED | OUTPATIENT
Start: 2024-11-26

## 2024-12-05 ENCOUNTER — CLINICAL SUPPORT (OUTPATIENT)
Dept: FAMILY MEDICINE CLINIC | Facility: CLINIC | Age: 69
End: 2024-12-05
Payer: MEDICARE

## 2024-12-05 DIAGNOSIS — Z23 NEED FOR INFLUENZA VACCINATION: Primary | ICD-10-CM

## 2024-12-09 ENCOUNTER — OFFICE VISIT (OUTPATIENT)
Dept: FAMILY MEDICINE CLINIC | Facility: CLINIC | Age: 69
End: 2024-12-09
Payer: MEDICARE

## 2024-12-09 VITALS
WEIGHT: 181 LBS | BODY MASS INDEX: 33.31 KG/M2 | HEIGHT: 62 IN | OXYGEN SATURATION: 95 % | TEMPERATURE: 97.9 F | HEART RATE: 67 BPM | DIASTOLIC BLOOD PRESSURE: 78 MMHG | SYSTOLIC BLOOD PRESSURE: 124 MMHG

## 2024-12-09 DIAGNOSIS — Z00.00 PHYSICAL EXAM, ANNUAL: Primary | ICD-10-CM

## 2024-12-09 PROCEDURE — 1159F MED LIST DOCD IN RCRD: CPT | Performed by: FAMILY MEDICINE

## 2024-12-09 PROCEDURE — 1170F FXNL STATUS ASSESSED: CPT | Performed by: FAMILY MEDICINE

## 2024-12-09 PROCEDURE — 3074F SYST BP LT 130 MM HG: CPT | Performed by: FAMILY MEDICINE

## 2024-12-09 PROCEDURE — 3078F DIAST BP <80 MM HG: CPT | Performed by: FAMILY MEDICINE

## 2024-12-09 PROCEDURE — G0439 PPPS, SUBSEQ VISIT: HCPCS | Performed by: FAMILY MEDICINE

## 2024-12-09 PROCEDURE — 1126F AMNT PAIN NOTED NONE PRSNT: CPT | Performed by: FAMILY MEDICINE

## 2024-12-09 PROCEDURE — 1160F RVW MEDS BY RX/DR IN RCRD: CPT | Performed by: FAMILY MEDICINE

## 2024-12-09 NOTE — PROGRESS NOTES
Subjective   The ABCs of the Annual Wellness Visit  Medicare Wellness Visit      Jonathan Rodriguez is a 69 y.o. patient who presents for a Medicare Wellness Visit.    The following portions of the patient's history were reviewed and   updated as appropriate: allergies, current medications, past family history, past medical history, past social history, past surgical history, and problem list.    Compared to one year ago, the patient's physical   health is the same.  Compared to one year ago, the patient's mental   health is the same.    Recent Hospitalizations:  She was not admitted to the hospital during the last year.     Current Medical Providers:  Patient Care Team:  Kerri Almanza MD as PCP - General (Family Medicine)  Bernadette Israel as Technologist  Rocky Malloy MD as Surgeon (Orthopedic Surgery)  Saw Smith MD as Surgeon (General Surgery)    Outpatient Medications Prior to Visit   Medication Sig Dispense Refill    albuterol sulfate  (90 Base) MCG/ACT inhaler Inhale 2 puffs Every 4 (Four) Hours As Needed for Shortness of Air. 18 g 2    amLODIPine (NORVASC) 5 MG tablet Take 1 tablet by mouth once daily 90 tablet 3    aspirin 325 MG tablet Take 1 tablet by mouth Daily.      atorvastatin (LIPITOR) 20 MG tablet TAKE 1 TABLET BY MOUTH ONCE DAILY AT NIGHT 90 tablet 3    budesonide-formoterol (SYMBICORT) 160-4.5 MCG/ACT inhaler Inhale 2 puffs Daily.      indapamide (LOZOL) 2.5 MG tablet TAKE 2 TABLETS BY MOUTH IN THE MORNING 180 tablet 3    levothyroxine (SYNTHROID, LEVOTHROID) 112 MCG tablet Take 1 tablet by mouth once daily 90 tablet 1    omeprazole (priLOSEC) 40 MG capsule Take 1 capsule by mouth Daily. 90 capsule 3    potassium chloride (KLOR-CON) 8 MEQ CR tablet Take 1 tablet by mouth Daily. 90 tablet 1     No facility-administered medications prior to visit.     No opioid medication identified on active medication list. I have reviewed chart for other potential  high risk  "medication/s and harmful drug interactions in the elderly.      Aspirin is on active medication list. Aspirin use is indicated based on review of current medical condition/s. Pros and cons of this therapy have been discussed today. Benefits of this medication outweigh potential harm.  Patient has been encouraged to continue taking this medication.  .      Patient Active Problem List   Diagnosis    Allergic rhinitis    Decreased white blood cell count    Encounter for general adult medical examination without abnormal findings    Hemorrhoids, external    Herpes zoster    Mixed hyperlipidemia    Essential hypertension    Loss of height    Obesity with body mass index 30 or greater    Obesity    Thyromegaly    Vaginitis, atrophic    Visit for screening mammogram    Acquired hypothyroidism    Otosclerosis of both ears    MRI contraindicated due to metal implant    LVH (left ventricular hypertrophy)    Arthritis of right knee    Reactive airway disease without complication    Chronic GERD    Cough    Acute nonintractable headache    SOB (shortness of breath)    Primary osteoarthritis of both knees    Primary hyperparathyroidism    Osteopenia of multiple sites     Advance Care Planning Advance Directive is not on file.  ACP discussion was held with the patient during this visit. Patient does not have an advance directive, information provided.            Objective   Vitals:    12/09/24 0849   BP: 124/78   BP Location: Right arm   Patient Position: Sitting   Cuff Size: Adult   Pulse: 67   Temp: 97.9 °F (36.6 °C)   TempSrc: Infrared   SpO2: 95%   Weight: 82.1 kg (181 lb)   Height: 157.5 cm (62.01\")   PainSc: 0-No pain       Estimated body mass index is 33.1 kg/m² as calculated from the following:    Height as of this encounter: 157.5 cm (62.01\").    Weight as of this encounter: 82.1 kg (181 lb).            Does the patient have evidence of cognitive impairment? No  Lab Results   Component Value Date    CHLPL 184 11/25/2024 "    TRIG 127 2024    HDL 52 2024     (H) 2024    VLDL 23 2024                                                                                                Health  Risk Assessment    Smoking Status:  Social History     Tobacco Use   Smoking Status Never    Passive exposure: Past   Smokeless Tobacco Never     Alcohol Consumption:  Social History     Substance and Sexual Activity   Alcohol Use No       Fall Risk Screen  STEADI Fall Risk Assessment was completed, and patient is at LOW risk for falls.Assessment completed on:2024    Depression Screening   Little interest or pleasure in doing things? Not at all   Feeling down, depressed, or hopeless? Not at all   PHQ-2 Total Score 0      Health Habits and Functional and Cognitive Screenin/9/2024     8:51 AM   Functional & Cognitive Status   Do you have difficulty preparing food and eating? No   Do you have difficulty bathing yourself, getting dressed or grooming yourself? No   Do you have difficulty using the toilet? No   Do you have difficulty moving around from place to place? No   Do you have trouble with steps or getting out of a bed or a chair? No   Current Diet Well Balanced Diet   Dental Exam Up to date   Eye Exam Up to date   Exercise (times per week) 4 times per week   Current Exercises Include Yard Work   Do you need help using the phone?  No   Are you deaf or do you have serious difficulty hearing?  No   Do you need help to go to places out of walking distance? No   Do you need help shopping? No   Do you need help preparing meals?  No   Do you need help with housework?  No   Do you need help with laundry? No   Do you need help taking your medications? No   Do you need help managing money? No   Do you ever drive or ride in a car without wearing a seat belt? No   Have you felt unusual stress, anger or loneliness in the last month? No   Who do you live with? Spouse   If you need help, do you have trouble finding  someone available to you? No   Have you been bothered in the last four weeks by sexual problems? No   Do you have difficulty concentrating, remembering or making decisions? No           Age-appropriate Screening Schedule:  Refer to the list below for future screening recommendations based on patient's age, sex and/or medical conditions. Orders for these recommended tests are listed in the plan section. The patient has been provided with a written plan.    Health Maintenance List  Health Maintenance   Topic Date Due    COVID-19 Vaccine (4 - 2024-25 season) 09/01/2024    DXA SCAN  09/01/2024    LIPID PANEL  11/25/2025    BMI FOLLOWUP  11/25/2025    ANNUAL WELLNESS VISIT  12/09/2025    MAMMOGRAM  09/09/2026    TDAP/TD VACCINES (2 - Td or Tdap) 08/17/2027    COLORECTAL CANCER SCREENING  08/19/2034    HEPATITIS C SCREENING  Completed    INFLUENZA VACCINE  Completed    Pneumococcal Vaccine 65+  Completed    ZOSTER VACCINE  Completed                                                                                                                                                CMS Preventative Services Quick Reference  Risk Factors Identified During Encounter  None Identified    The above risks/problems have been discussed with the patient.  Pertinent information has been shared with the patient in the After Visit Summary.  An After Visit Summary and PPPS were made available to the patient.    Follow Up:   Next Medicare Wellness visit to be scheduled in 1 year.         Additional E&M Note during same encounter follows:  Patient has additional, significant, and separately identifiable condition(s)/problem(s) that require work above and beyond the Medicare Wellness Visit     Chief Complaint  Medicare Wellness-subsequent    Subjective    HPI         The patient presents for a preventive health assessment.    She reports that her health status remains consistent with the previous year, with no hospital admissions in the past 18  "months. She does not have an advanced directive or living will in place. Her last colonoscopy was performed on 08/19/2023 and her most recent mammogram was conducted in 09/2023.    IMMUNIZATIONS  She is up to date on her vaccines.          Objective   Vital Signs:  /78 (BP Location: Right arm, Patient Position: Sitting, Cuff Size: Adult)   Pulse 67   Temp 97.9 °F (36.6 °C) (Infrared)   Ht 157.5 cm (62.01\")   Wt 82.1 kg (181 lb)   SpO2 95%   BMI 33.10 kg/m²   Physical Exam  Constitutional:       Appearance: She is well-developed.      Comments:      HENT:      Head: Normocephalic and atraumatic.   Eyes:      Conjunctiva/sclera: Conjunctivae normal.   Cardiovascular:      Rate and Rhythm: Normal rate.   Pulmonary:      Effort: Pulmonary effort is normal.   Musculoskeletal:         General: Normal range of motion.      Cervical back: Normal range of motion.   Skin:     General: Skin is warm and dry.      Findings: No rash.   Neurological:      Mental Status: She is alert and oriented to person, place, and time.   Psychiatric:         Behavior: Behavior normal.                       Results       Diagnoses and all orders for this visit:    1. Physical exam, annual (Primary)               Assessment and Plan        1. Preventive health assessment.  She successfully completed the dementia screening and is current with all preventive care measures. She received her flu shot recently. Her colonoscopy was done on 08/19/2024, and her mammogram was done in September 2024, both of which were normal.    Follow-up  Patient is scheduled for a follow-up visit in May 2025.            Follow Up   Return in about 1 year (around 12/9/2025), or for Medicare Wellness AND routine follow up.  Patient was given instructions and counseling regarding her condition or for health maintenance advice. Please see specific information pulled into the AVS if appropriate.  Patient or patient representative verbalized consent for the use " of Ambient Listening during the visit with  Kerri Almanza MD for chart documentation. 12/9/2024  09:17 EST

## 2025-01-20 DIAGNOSIS — K21.9 CHRONIC GERD: ICD-10-CM

## 2025-01-20 RX ORDER — OMEPRAZOLE 40 MG/1
40 CAPSULE, DELAYED RELEASE ORAL DAILY
Qty: 90 CAPSULE | Refills: 3 | Status: SHIPPED | OUTPATIENT
Start: 2025-01-20

## 2025-02-02 DIAGNOSIS — E03.9 ACQUIRED HYPOTHYROIDISM: ICD-10-CM

## 2025-02-03 RX ORDER — LEVOTHYROXINE SODIUM 112 UG/1
TABLET ORAL
Qty: 90 TABLET | Refills: 0 | Status: SHIPPED | OUTPATIENT
Start: 2025-02-03

## 2025-03-03 ENCOUNTER — OFFICE VISIT (OUTPATIENT)
Dept: FAMILY MEDICINE CLINIC | Facility: CLINIC | Age: 70
End: 2025-03-03
Payer: MEDICARE

## 2025-03-03 VITALS
BODY MASS INDEX: 33.53 KG/M2 | HEIGHT: 62 IN | OXYGEN SATURATION: 97 % | TEMPERATURE: 97.1 F | RESPIRATION RATE: 16 BRPM | SYSTOLIC BLOOD PRESSURE: 116 MMHG | HEART RATE: 68 BPM | DIASTOLIC BLOOD PRESSURE: 65 MMHG | WEIGHT: 182.2 LBS

## 2025-03-03 DIAGNOSIS — H91.91 HEARING LOSS OF RIGHT EAR, UNSPECIFIED HEARING LOSS TYPE: ICD-10-CM

## 2025-03-03 DIAGNOSIS — H61.21 IMPACTED CERUMEN OF RIGHT EAR: Primary | ICD-10-CM

## 2025-03-03 PROCEDURE — 3078F DIAST BP <80 MM HG: CPT | Performed by: NURSE PRACTITIONER

## 2025-03-03 PROCEDURE — 69209 REMOVE IMPACTED EAR WAX UNI: CPT | Performed by: NURSE PRACTITIONER

## 2025-03-03 PROCEDURE — 3074F SYST BP LT 130 MM HG: CPT | Performed by: NURSE PRACTITIONER

## 2025-03-03 PROCEDURE — 99213 OFFICE O/P EST LOW 20 MIN: CPT | Performed by: NURSE PRACTITIONER

## 2025-03-03 NOTE — PROGRESS NOTES
"    Jonathan Rodriguez is a 69 y.o. female.     History of Present Illness  69-year-old white female patient of Dr. Almanza who comes in today with complaints of hearing loss in right ear.  She has had surgery on both ears however the right ear today is impacted.  Tried to irrigate but was unsuccessful will put on Debrox eardrops and reintegrate in about a week.  If hearing loss is still prominent after wax has been removed advised her to go back to her ENT    Vital signs are stable              Right ear irrigation  Debrox eardrops  Follow-up 1 week after drops for reirrigation       The following portions of the patient's history were reviewed and updated as appropriate: allergies, current medications, past family history, past medical history, past social history, past surgical history, and problem list.    Vitals:    25 1045   BP: 116/65   BP Location: Right arm   Patient Position: Sitting   Cuff Size: Large Adult   Pulse: 68   Resp: 16   Temp: 97.1 °F (36.2 °C)   SpO2: 97%   Weight: 82.6 kg (182 lb 3.2 oz)   Height: 157.5 cm (62\")       Past Medical History:   Diagnosis Date    Asthma 2020    Copd    Back pain     COPD (chronic obstructive pulmonary disease) 2020    Hyperlipidemia     Hyperparathyroidism 2002    Hypertension     Hypothyroidism     MAMMO     NEG =     Obesity     Otosclerosis     Pneumonia 2018    Postmenopausal     Thyroid nodule ?    Thyromegaly 2012    Visit for screening mammogram 2017     Past Surgical History:   Procedure Laterality Date    COLONOSCOPY      NEG = , rech     INNER EAR SURGERY Bilateral     JOINT REPLACEMENT  23    THYROIDECTOMY, PARTIAL      Left thyroid mass= Benign.  sometime after     TONSILLECTOMY      TUBAL ABDOMINAL LIGATION       Family History   Problem Relation Age of Onset    COPD Mother             Hyperlipidemia Father     Hypertension Father     Heart disease Father     Stroke Father     " Hypertension Brother     Stroke Brother     Diabetes Brother     Heart disease Brother     Lymphoma Brother     Cancer Brother     Hyperlipidemia Brother     Colon cancer Paternal Grandmother      Immunization History   Administered Date(s) Administered    COVID-19 (MODERNA) 1st,2nd,3rd Dose Monovalent 02/04/2021, 03/04/2021    COVID-19 (MODERNA) Monovalent Original Booster 11/08/2021    Flu Vaccine Quad PF 6-35MO 10/04/2016, 10/27/2017    Fluad Quad 65+ 10/05/2020    Fluzone  >6mos 11/18/2015    Fluzone High-Dose 65+YRS 10/03/2018, 11/12/2019, 12/05/2024    Fluzone High-Dose 65+yrs 10/11/2021, 10/27/2022, 11/09/2023    Hepatitis A 07/30/2018, 02/14/2019    Pneumococcal Conjugate 13-Valent (PCV13) 10/03/2018    Pneumococcal Polysaccharide (PPSV23) 11/12/2019    Shingrix 04/18/2022, 07/20/2022    Tdap 08/17/2017    Zostavax 01/17/2018       Office Visit on 11/25/2024   Component Date Value Ref Range Status    WBC 11/25/2024 6.9  3.4 - 10.8 x10E3/uL Final    Comment: **Effective December 2, 2024 profile 137991 WBC will be made**    non-orderable as a stand-alone order code.      RBC 11/25/2024 4.96  3.77 - 5.28 x10E6/uL Final    Hemoglobin 11/25/2024 14.3  11.1 - 15.9 g/dL Final    Hematocrit 11/25/2024 45.5  34.0 - 46.6 % Final    MCV 11/25/2024 92  79 - 97 fL Final    MCH 11/25/2024 28.8  26.6 - 33.0 pg Final    MCHC 11/25/2024 31.4 (L)  31.5 - 35.7 g/dL Final    RDW 11/25/2024 12.5  11.7 - 15.4 % Final    Platelets 11/25/2024 281  150 - 450 x10E3/uL Final    Neutrophil Rel % 11/25/2024 68  Not Estab. % Final    Lymphocyte Rel % 11/25/2024 21  Not Estab. % Final    Monocyte Rel % 11/25/2024 8  Not Estab. % Final    Eosinophil Rel % 11/25/2024 2  Not Estab. % Final    Basophil Rel % 11/25/2024 1  Not Estab. % Final    Neutrophils Absolute 11/25/2024 4.7  1.4 - 7.0 x10E3/uL Final    Lymphocytes Absolute 11/25/2024 1.4  0.7 - 3.1 x10E3/uL Final    Monocytes Absolute 11/25/2024 0.6  0.1 - 0.9 x10E3/uL Final     Eosinophils Absolute 11/25/2024 0.2  0.0 - 0.4 x10E3/uL Final    Basophils Absolute 11/25/2024 0.1  0.0 - 0.2 x10E3/uL Final    Immature Granulocyte Rel % 11/25/2024 0  Not Estab. % Final    Immature Grans Absolute 11/25/2024 0.0  0.0 - 0.1 x10E3/uL Final    Glucose 11/25/2024 90  70 - 99 mg/dL Final    BUN 11/25/2024 18  8 - 27 mg/dL Final    Creatinine 11/25/2024 0.74  0.57 - 1.00 mg/dL Final    EGFR Result 11/25/2024 86  >59 mL/min/1.73 Final    BUN/Creatinine Ratio 11/25/2024 24  12 - 28 Final    Sodium 11/25/2024 141  134 - 144 mmol/L Final    Potassium 11/25/2024 3.2 (L)  3.5 - 5.2 mmol/L Final    Chloride 11/25/2024 101  96 - 106 mmol/L Final    Total CO2 11/25/2024 27  20 - 29 mmol/L Final    Calcium 11/25/2024 10.6 (H)  8.7 - 10.3 mg/dL Final    Total Protein 11/25/2024 6.6  6.0 - 8.5 g/dL Final    Albumin 11/25/2024 4.3  3.8 - 4.8 g/dL Final    Globulin 11/25/2024 2.3  1.5 - 4.5 g/dL Final    Total Bilirubin 11/25/2024 0.5  0.0 - 1.2 mg/dL Final    Alkaline Phosphatase 11/25/2024 166 (H)  44 - 121 IU/L Final    AST (SGOT) 11/25/2024 24  0 - 40 IU/L Final    ALT (SGPT) 11/25/2024 28  0 - 32 IU/L Final    Free T4 11/25/2024 1.66  0.82 - 1.77 ng/dL Final    T3, Free 11/25/2024 3.1  2.0 - 4.4 pg/mL Final    TSH 11/25/2024 0.747  0.450 - 4.500 uIU/mL Final    Total Cholesterol 11/25/2024 184  100 - 199 mg/dL Final    Triglycerides 11/25/2024 127  0 - 149 mg/dL Final    HDL Cholesterol 11/25/2024 52  >39 mg/dL Final    VLDL Cholesterol Michael 11/25/2024 23  5 - 40 mg/dL Final    LDL Chol Calc ([facility]) 11/25/2024 109 (H)  0 - 99 mg/dL Final    PTH, Intact 11/25/2024 121 (H)  15 - 65 pg/mL Final    Ionized Calcium 11/25/2024 5.6  4.5 - 5.6 mg/dL Final         Review of Systems   Constitutional: Negative.    HENT:  Positive for hearing loss.    Respiratory: Negative.     Cardiovascular: Negative.    Gastrointestinal: Negative.    Genitourinary: Negative.    Musculoskeletal: Negative.    Skin: Negative.    Neurological:  Negative.    Psychiatric/Behavioral: Negative.         Objective   Physical Exam  Constitutional:       Appearance: Normal appearance.   HENT:      Head: Normocephalic.      Ears:      Comments: Right ear impaction  Cardiovascular:      Rate and Rhythm: Normal rate and regular rhythm.      Pulses: Normal pulses.      Heart sounds: Normal heart sounds.   Pulmonary:      Effort: Pulmonary effort is normal.      Breath sounds: Normal breath sounds.   Abdominal:      General: Bowel sounds are normal.   Musculoskeletal:         General: Normal range of motion.   Skin:     General: Skin is warm.   Neurological:      General: No focal deficit present.      Mental Status: She is alert and oriented to person, place, and time.   Psychiatric:         Mood and Affect: Mood normal.         Behavior: Behavior normal.         Ear Cerumen Removal    Date/Time: 3/3/2025 11:10 AM    Performed by: Fatimah Gregg APRN  Authorized by: Fatimah Gregg APRN    Anesthesia:  Local Anesthetic: none  Location details: right ear  Patient tolerance: patient tolerated the procedure well with no immediate complications  Procedure type: irrigation   Sedation:  Patient sedated: no            Assessment & Plan   There are no diagnoses linked to this encounter.       Current Outpatient Medications:     albuterol sulfate  (90 Base) MCG/ACT inhaler, Inhale 2 puffs Every 4 (Four) Hours As Needed for Shortness of Air., Disp: 18 g, Rfl: 2    amLODIPine (NORVASC) 5 MG tablet, Take 1 tablet by mouth once daily, Disp: 90 tablet, Rfl: 3    aspirin 325 MG tablet, Take 1 tablet by mouth Daily., Disp: , Rfl:     atorvastatin (LIPITOR) 20 MG tablet, TAKE 1 TABLET BY MOUTH ONCE DAILY AT NIGHT, Disp: 90 tablet, Rfl: 3    budesonide-formoterol (SYMBICORT) 160-4.5 MCG/ACT inhaler, Inhale 2 puffs Daily., Disp: , Rfl:     carbamide peroxide (Debrox) 6.5 % otic solution, Administer 5 drops to the right ear 2 (Two) Times a Day., Disp: 15 mL, Rfl: 0     indapamide (LOZOL) 2.5 MG tablet, TAKE 2 TABLETS BY MOUTH IN THE MORNING, Disp: 180 tablet, Rfl: 3    levothyroxine (SYNTHROID, LEVOTHROID) 112 MCG tablet, Take 1 tablet by mouth once daily, Disp: 90 tablet, Rfl: 0    omeprazole (priLOSEC) 40 MG capsule, Take 1 capsule by mouth once daily, Disp: 90 capsule, Rfl: 3    potassium chloride (KLOR-CON) 8 MEQ CR tablet, Take 1 tablet by mouth Daily., Disp: 90 tablet, Rfl: 1           DINO Quigley 3/3/2025 11:07 EST  This note has been electronically signed

## 2025-03-10 ENCOUNTER — CLINICAL SUPPORT (OUTPATIENT)
Dept: FAMILY MEDICINE CLINIC | Facility: CLINIC | Age: 70
End: 2025-03-10
Payer: MEDICARE

## 2025-03-10 DIAGNOSIS — H61.21 RIGHT EAR IMPACTED CERUMEN: Primary | ICD-10-CM

## 2025-03-10 PROCEDURE — 69210 REMOVE IMPACTED EAR WAX UNI: CPT | Performed by: NURSE PRACTITIONER

## 2025-03-10 NOTE — PROGRESS NOTES
Procedure   Ear Cerumen Removal    Date/Time: 3/10/2025 8:49 AM    Performed by: Fatimah Gregg APRN  Authorized by: Fatimah Gregg APRN    Anesthesia:  Local Anesthetic: none  Location details: right ear  Procedure type: instrumentation, irrigation   Sedation:  Patient sedated: no

## 2025-04-04 ENCOUNTER — TELEPHONE (OUTPATIENT)
Dept: FAMILY MEDICINE CLINIC | Facility: CLINIC | Age: 70
End: 2025-04-04
Payer: MEDICARE

## 2025-04-04 RX ORDER — MECLIZINE HYDROCHLORIDE 25 MG/1
25 TABLET ORAL 3 TIMES DAILY PRN
Qty: 30 TABLET | Refills: 2 | Status: SHIPPED | OUTPATIENT
Start: 2025-04-04

## 2025-04-04 NOTE — TELEPHONE ENCOUNTER
Caller: Jonathan Rodriguez    Relationship: Self    Best call back number:     [phone] (Mobile)       What medication are you requesting: meclizine (ANTIVERT) 12.5 MG tablet     What are your current symptoms: VERY BAD VERTIGO AGAIN     How long have you been experiencing symptoms:     Have you had these symptoms before:    [x] Yes  [] No    Have you been treated for these symptoms before:   [x] Yes  [] No    If a prescription is needed, what is your preferred pharmacy and phone number: [facility] [address] [facility] [place] - [address] [phone] [facility] [phone]      Additional notes:

## 2025-05-13 DIAGNOSIS — E03.9 ACQUIRED HYPOTHYROIDISM: ICD-10-CM

## 2025-05-13 RX ORDER — LEVOTHYROXINE SODIUM 112 UG/1
112 TABLET ORAL DAILY
Qty: 90 TABLET | Refills: 0 | Status: SHIPPED | OUTPATIENT
Start: 2025-05-13

## 2025-05-28 ENCOUNTER — OFFICE VISIT (OUTPATIENT)
Dept: FAMILY MEDICINE CLINIC | Facility: CLINIC | Age: 70
End: 2025-05-28
Payer: MEDICARE

## 2025-05-28 VITALS
TEMPERATURE: 97.5 F | WEIGHT: 178.8 LBS | BODY MASS INDEX: 32.9 KG/M2 | RESPIRATION RATE: 18 BRPM | SYSTOLIC BLOOD PRESSURE: 127 MMHG | OXYGEN SATURATION: 94 % | DIASTOLIC BLOOD PRESSURE: 72 MMHG | HEIGHT: 62 IN | HEART RATE: 65 BPM

## 2025-05-28 DIAGNOSIS — Z86.0100 HISTORY OF COLON POLYPS: ICD-10-CM

## 2025-05-28 DIAGNOSIS — Z12.31 ENCOUNTER FOR SCREENING MAMMOGRAM FOR MALIGNANT NEOPLASM OF BREAST: ICD-10-CM

## 2025-05-28 DIAGNOSIS — I10 ESSENTIAL HYPERTENSION: Primary | ICD-10-CM

## 2025-05-28 DIAGNOSIS — E21.0 PRIMARY HYPERPARATHYROIDISM: ICD-10-CM

## 2025-05-28 DIAGNOSIS — E78.2 MIXED HYPERLIPIDEMIA: ICD-10-CM

## 2025-05-28 DIAGNOSIS — E03.9 ACQUIRED HYPOTHYROIDISM: ICD-10-CM

## 2025-05-28 DIAGNOSIS — E87.6 HYPOKALEMIA: ICD-10-CM

## 2025-05-28 DIAGNOSIS — K21.9 CHRONIC GERD: ICD-10-CM

## 2025-05-28 DIAGNOSIS — R42 VERTIGO: ICD-10-CM

## 2025-05-28 RX ORDER — MECLIZINE HYDROCHLORIDE 25 MG/1
25 TABLET ORAL 3 TIMES DAILY PRN
Qty: 30 TABLET | Refills: 2 | Status: SHIPPED | OUTPATIENT
Start: 2025-05-28

## 2025-05-28 NOTE — PROGRESS NOTES
Answers submitted by the patient for this visit:  Problem not listed (Submitted on 5/21/2025)  Chief Complaint: Other medical problem  Reason for appointment: Yearly kat  abdominal pain: No  anorexia: No  joint pain: No  change in stool: No  chest pain: No  chills: No  nasal congestion: No  cough: No  diaphoresis: No  fatigue: No  fever: No  headaches: No  joint swelling: No  myalgias: No  nausea: No  neck pain: No  numbness: No  rash: No  sore throat: No  swollen glands: No  dysuria: No  vertigo: No  visual change: No  vomiting: No  weakness: No  Onset: at an unknown time  Lenny Rodriguez is a 69 y.o. female.   Chief Complaint   Patient presents with    Hypertension    Hyperlipidemia    Hypothyroidism       History of Present Illness   69 y.o. female with past medical history as below presents the office today to follow-up.  I last saw her 6 months ago for Medicare wellness.  Last lab studies were done at that time.  Thyroid function was normal.  Calcium and parathyroid hormones were slightly elevated.  I started her on a potassium supplement due to chronically mildly low potassium levels.  Last screening mammogram was September 9, 2024.  She had a colonoscopy last year.  She had a polypectomy.  We do not have the pathology.  Follow-up will either be in 7 or 10 years.  History of Present Illness  The patient presents for evaluation of vertigo.    She has been experiencing episodes of vertigo, with one episode occurring last year, another in 03/2025, and a subsequent episode in 04/2025. She reports no vertigo symptoms this month. She was previously prescribed meclizine by Fatimah, which she reports as beneficial, typically alleviating her symptoms within a 3-day period. She does not have any meclizine left. She inquires if stress can cause vertigo, mentioning that she experienced vertigo after stressful events, including babysitting and her daughter getting hurt.    She reports no issues with heartburn.  She is currently on Prilosec, which effectively manages her heartburn symptoms.    She has been compliant with her potassium supplementation regimen. She started taking the potassium pills regularly after her daughter was hospitalized and received potassium supplementation.    No other complaints or problems reported with medications.      Patient Active Problem List    Diagnosis Date Noted    Osteopenia of multiple sites 11/25/2024     Note Last Updated: 11/25/2024     On DEXA - 2021      Primary hyperparathyroidism 01/18/2023    Primary osteoarthritis of both knees 11/08/2022    Cough 01/03/2022    Acute nonintractable headache 01/03/2022    SOB (shortness of breath) 01/03/2022    Chronic GERD 10/11/2021    Reactive airway disease without complication 10/05/2020    Arthritis of right knee 10/04/2020    LVH (left ventricular hypertrophy) 06/01/2020    Acquired hypothyroidism 11/25/2019    Otosclerosis of both ears 11/25/2019     Note Last Updated: 11/25/2019     Replaced bones in ears with metal pistons - NO MRI's      MRI contraindicated due to metal implant 11/25/2019     Note Last Updated: 11/25/2019     Due to metal implants in inner ear      Mixed hyperlipidemia 08/15/2019    Allergic rhinitis 12/26/2018    Herpes zoster 12/17/2018    Loss of height 06/25/2018    Encounter for general adult medical examination without abnormal findings 06/21/2017    Visit for screening mammogram 06/21/2017    Obesity with body mass index 30 or greater 06/21/2016    Hemorrhoids, external 06/11/2012    Obesity 06/11/2012    Thyromegaly 06/11/2012    Vaginitis, atrophic 06/11/2012    Decreased white blood cell count 06/04/2012    Essential hypertension 06/04/2012           Past Surgical History:   Procedure Laterality Date    COLONOSCOPY      NEG = 2014, rech 2024    INNER EAR SURGERY Bilateral     JOINT REPLACEMENT  5-2-23    THYROIDECTOMY, PARTIAL      Left thyroid mass= Benign.  sometime after 2010    TONSILLECTOMY      TUBAL  ABDOMINAL LIGATION       Current Outpatient Medications on File Prior to Visit   Medication Sig    albuterol sulfate  (90 Base) MCG/ACT inhaler Inhale 2 puffs Every 4 (Four) Hours As Needed for Shortness of Air.    amLODIPine (NORVASC) 5 MG tablet Take 1 tablet by mouth once daily    aspirin 325 MG tablet Take 1 tablet by mouth Daily.    atorvastatin (LIPITOR) 20 MG tablet TAKE 1 TABLET BY MOUTH ONCE DAILY AT NIGHT    budesonide-formoterol (SYMBICORT) 160-4.5 MCG/ACT inhaler Inhale 2 puffs Daily.    carbamide peroxide (Debrox) 6.5 % otic solution Administer 5 drops to the right ear 2 (Two) Times a Day.    indapamide (LOZOL) 2.5 MG tablet TAKE 2 TABLETS BY MOUTH IN THE MORNING    levothyroxine (SYNTHROID, LEVOTHROID) 112 MCG tablet Take 1 tablet by mouth once daily    omeprazole (priLOSEC) 40 MG capsule Take 1 capsule by mouth once daily    potassium chloride (KLOR-CON) 8 MEQ CR tablet Take 1 tablet by mouth Daily.    [DISCONTINUED] meclizine (ANTIVERT) 25 MG tablet Take 1 tablet by mouth 3 (Three) Times a Day As Needed for Dizziness.     No current facility-administered medications on file prior to visit.     No Known Allergies  Social History     Socioeconomic History    Marital status:      Spouse name: Wilfrido    Number of children: 2   Tobacco Use    Smoking status: Never     Passive exposure: Past    Smokeless tobacco: Never   Vaping Use    Vaping status: Never Used   Substance and Sexual Activity    Alcohol use: No    Drug use: Yes     Types: Oxycodone    Sexual activity: Not Currently     Partners: Male     Birth control/protection: None, Tubal ligation     Family History   Problem Relation Age of Onset    COPD Mother             Hyperlipidemia Father     Hypertension Father     Heart disease Father     Stroke Father     Hypertension Brother     Stroke Brother     Diabetes Brother     Heart disease Brother     Lymphoma Brother     Cancer Brother     Hyperlipidemia Brother     Colon  "cancer Paternal Grandmother        Review of Systems    Objective   /72 (BP Location: Left arm, Patient Position: Sitting, Cuff Size: Adult)   Pulse 65   Temp 97.5 °F (36.4 °C) (Infrared)   Resp 18   Ht 157.5 cm (62\")   Wt 81.1 kg (178 lb 12.8 oz)   LMP  (LMP Unknown)   SpO2 94%   Breastfeeding No   BMI 32.70 kg/m²   Physical Exam  Constitutional:       Appearance: She is well-developed.      Comments:      HENT:      Head: Normocephalic and atraumatic.   Eyes:      Conjunctiva/sclera: Conjunctivae normal.   Cardiovascular:      Rate and Rhythm: Normal rate.   Pulmonary:      Effort: Pulmonary effort is normal.   Musculoskeletal:         General: Normal range of motion.      Cervical back: Normal range of motion.   Skin:     General: Skin is warm and dry.      Findings: No rash.   Neurological:      Mental Status: She is alert and oriented to person, place, and time.   Psychiatric:         Behavior: Behavior normal.       Physical Exam        No visits with results within 4 Month(s) from this visit.   Latest known visit with results is:   Office Visit on 11/25/2024   Component Date Value Ref Range Status    WBC 11/25/2024 6.9  3.4 - 10.8 x10E3/uL Final    Comment: **Effective December 2, 2024 profile [id number] WBC will be made**    non-orderable as a stand-alone order code.      RBC 11/25/2024 4.96  3.77 - 5.28 x10E6/uL Final    Hemoglobin 11/25/2024 14.3  11.1 - 15.9 g/dL Final    Hematocrit 11/25/2024 45.5  34.0 - 46.6 % Final    MCV 11/25/2024 92  79 - 97 fL Final    MCH 11/25/2024 28.8  26.6 - 33.0 pg Final    MCHC 11/25/2024 31.4 (L)  31.5 - 35.7 g/dL Final    RDW 11/25/2024 12.5  11.7 - 15.4 % Final    Platelets 11/25/2024 281  150 - 450 x10E3/uL Final    Neutrophil Rel % 11/25/2024 68  Not Estab. % Final    Lymphocyte Rel % 11/25/2024 21  Not Estab. % Final    Monocyte Rel % 11/25/2024 8  Not Estab. % Final    Eosinophil Rel % 11/25/2024 2  Not Estab. % Final    Basophil Rel % 11/25/2024 1  Not " Estab. % Final    Neutrophils Absolute 11/25/2024 4.7  1.4 - 7.0 x10E3/uL Final    Lymphocytes Absolute 11/25/2024 1.4  0.7 - 3.1 x10E3/uL Final    Monocytes Absolute 11/25/2024 0.6  0.1 - 0.9 x10E3/uL Final    Eosinophils Absolute 11/25/2024 0.2  0.0 - 0.4 x10E3/uL Final    Basophils Absolute 11/25/2024 0.1  0.0 - 0.2 x10E3/uL Final    Immature Granulocyte Rel % 11/25/2024 0  Not Estab. % Final    Immature Grans Absolute 11/25/2024 0.0  0.0 - 0.1 x10E3/uL Final    Glucose 11/25/2024 90  70 - 99 mg/dL Final    BUN 11/25/2024 18  8 - 27 mg/dL Final    Creatinine 11/25/2024 0.74  0.57 - 1.00 mg/dL Final    EGFR Result 11/25/2024 86  >59 mL/min/1.73 Final    BUN/Creatinine Ratio 11/25/2024 24  12 - 28 Final    Sodium 11/25/2024 141  134 - 144 mmol/L Final    Potassium 11/25/2024 3.2 (L)  3.5 - 5.2 mmol/L Final    Chloride 11/25/2024 101  96 - 106 mmol/L Final    Total CO2 11/25/2024 27  20 - 29 mmol/L Final    Calcium 11/25/2024 10.6 (H)  8.7 - 10.3 mg/dL Final    Total Protein 11/25/2024 6.6  6.0 - 8.5 g/dL Final    Albumin 11/25/2024 4.3  3.8 - 4.8 g/dL Final    Globulin 11/25/2024 2.3  1.5 - 4.5 g/dL Final    Total Bilirubin 11/25/2024 0.5  0.0 - 1.2 mg/dL Final    Alkaline Phosphatase 11/25/2024 166 (H)  44 - 121 IU/L Final    AST (SGOT) 11/25/2024 24  0 - 40 IU/L Final    ALT (SGPT) 11/25/2024 28  0 - 32 IU/L Final    Free T4 11/25/2024 1.66  0.82 - 1.77 ng/dL Final    T3, Free 11/25/2024 3.1  2.0 - 4.4 pg/mL Final    TSH 11/25/2024 0.747  0.450 - 4.500 uIU/mL Final    Total Cholesterol 11/25/2024 184  100 - 199 mg/dL Final    Triglycerides 11/25/2024 127  0 - 149 mg/dL Final    HDL Cholesterol 11/25/2024 52  >39 mg/dL Final    VLDL Cholesterol Michael 11/25/2024 23  5 - 40 mg/dL Final    LDL Chol Calc (NIH) 11/25/2024 109 (H)  0 - 99 mg/dL Final    PTH, Intact 11/25/2024 121 (H)  15 - 65 pg/mL Final    Ionized Calcium 11/25/2024 5.6  4.5 - 5.6 mg/dL Final     Results            Assessment & Plan   Diagnoses and all  orders for this visit:    1. Essential hypertension (Primary)  -     Comprehensive Metabolic Panel    2. Mixed hyperlipidemia    3. Acquired hypothyroidism  -     T4, Free  -     T3, Free  -     TSH    4. Primary hyperparathyroidism  -     PTH, Intact  -     Calcium, Ionized    5. Chronic GERD    6. Vertigo  -     meclizine (ANTIVERT) 25 MG tablet; Take 1 tablet by mouth 3 (Three) Times a Day As Needed for Dizziness.  Dispense: 30 tablet; Refill: 2    7. Hypokalemia  -     Comprehensive Metabolic Panel    8. Encounter for screening mammogram for malignant neoplasm of breast  -     Mammo Screening Digital Tomosynthesis Bilateral With CAD; Future    9. History of colon polyps      Assessment & Plan  1. Vertigo.  - Experienced vertigo episodes in 03/2025 and 04/2025.  - Pathophysiology of vertigo explained, including the role of semicircular canals and crystals.  - Potential causes discussed: viral infections and allergies; stress was clarified not to be a cause.  - Prescription for meclizine provided for use as needed; referral to vestibular rehabilitation at [facility] if another episode occurs.    2. Heartburn.  - Heartburn is well-controlled with Prilosec.    3. Hypokalemia.  - Potassium levels were slightly low during the last assessment.  - Patient has been taking potassium supplements.  - Blood work will be conducted today to monitor thyroid and potassium levels.    Follow-up  The patient will follow up in 12/2025 for her Medicare checkup.    Mammogram due in September..  Order sent to [facility].    Hypertensions under good control.  Continue current antihypertensive medicines at current doses.  Continue current dose of levothyroxine.  Thyroid function test ordered as above.  Will also order PTH and calcium to monitor hyperparathyroidism.  Remains asymptomatic with regard to that.          Call with any problems or concerns before next visit       Return as planned in December.  Patient or  patient representative verbalized consent for the use of Ambient Listening during the visit with  Kerri Almanza MD for chart documentation. 5/28/2025  09:09 EDT    Part of this note may be an electronic transcription/translation of spoken language to printed text using the Dragon Dictation System    Kerri Almanza MD5/28/202509:07 EDT  This note has been electronically signed

## 2025-05-29 LAB
ALBUMIN SERPL-MCNC: 3.8 G/DL (ref 3.8–4.8)
ALP SERPL-CCNC: 147 IU/L (ref 44–121)
ALT SERPL-CCNC: 21 IU/L (ref 0–32)
AST SERPL-CCNC: 21 IU/L (ref 0–40)
BILIRUB SERPL-MCNC: 0.3 MG/DL (ref 0–1.2)
BUN SERPL-MCNC: 16 MG/DL (ref 8–27)
BUN/CREAT SERPL: 24 (ref 12–28)
CA-I SERPL ISE-MCNC: 5.7 MG/DL (ref 4.5–5.6)
CALCIUM SERPL-MCNC: 10.3 MG/DL (ref 8.7–10.3)
CHLORIDE SERPL-SCNC: 103 MMOL/L (ref 96–106)
CO2 SERPL-SCNC: 26 MMOL/L (ref 20–29)
CREAT SERPL-MCNC: 0.66 MG/DL (ref 0.57–1)
EGFRCR SERPLBLD CKD-EPI 2021: 94 ML/MIN/1.73
GLOBULIN SER CALC-MCNC: 2 G/DL (ref 1.5–4.5)
GLUCOSE SERPL-MCNC: 114 MG/DL (ref 70–99)
POTASSIUM SERPL-SCNC: 3.1 MMOL/L (ref 3.5–5.2)
PROT SERPL-MCNC: 5.8 G/DL (ref 6–8.5)
PTH-INTACT SERPL-MCNC: 96 PG/ML (ref 15–65)
SODIUM SERPL-SCNC: 142 MMOL/L (ref 134–144)
T3FREE SERPL-MCNC: 3.1 PG/ML (ref 2–4.4)
T4 FREE SERPL-MCNC: 1.62 NG/DL (ref 0.82–1.77)
TSH SERPL DL<=0.005 MIU/L-ACNC: 0.47 UIU/ML (ref 0.45–4.5)

## 2025-07-11 ENCOUNTER — TELEPHONE (OUTPATIENT)
Dept: PAIN MEDICINE | Facility: CLINIC | Age: 70
End: 2025-07-11
Payer: MEDICARE

## 2025-07-11 NOTE — TELEPHONE ENCOUNTER
Provider: MIKE    Caller: Jonatahn Rodriguez    Relationship to Patient: Self    Pharmacy: NA    Phone Number: [phone]    Reason for Call: WANTS A PROCEDURE BEFORE AUG 9    When was the patient last seen: 8.29.23

## 2025-07-18 DIAGNOSIS — I51.7 LVH (LEFT VENTRICULAR HYPERTROPHY): ICD-10-CM

## 2025-07-18 DIAGNOSIS — I10 ESSENTIAL HYPERTENSION: ICD-10-CM

## 2025-07-21 RX ORDER — INDAPAMIDE 2.5 MG/1
TABLET ORAL
Qty: 180 TABLET | Refills: 3 | Status: SHIPPED | OUTPATIENT
Start: 2025-07-21

## 2025-08-27 DIAGNOSIS — E78.2 MIXED HYPERLIPIDEMIA: ICD-10-CM

## 2025-08-27 DIAGNOSIS — E03.9 ACQUIRED HYPOTHYROIDISM: ICD-10-CM

## 2025-08-27 RX ORDER — ATORVASTATIN CALCIUM 20 MG/1
20 TABLET, FILM COATED ORAL NIGHTLY
Qty: 90 TABLET | Refills: 3 | Status: SHIPPED | OUTPATIENT
Start: 2025-08-27

## 2025-08-27 RX ORDER — LEVOTHYROXINE SODIUM 112 UG/1
112 TABLET ORAL DAILY
Qty: 90 TABLET | Refills: 3 | Status: SHIPPED | OUTPATIENT
Start: 2025-08-27